# Patient Record
Sex: MALE | Race: WHITE | NOT HISPANIC OR LATINO | Employment: FULL TIME | URBAN - METROPOLITAN AREA
[De-identification: names, ages, dates, MRNs, and addresses within clinical notes are randomized per-mention and may not be internally consistent; named-entity substitution may affect disease eponyms.]

---

## 2017-04-24 ENCOUNTER — APPOINTMENT (OUTPATIENT)
Dept: LAB | Facility: CLINIC | Age: 57
End: 2017-04-24
Payer: COMMERCIAL

## 2017-04-24 ENCOUNTER — GENERIC CONVERSION - ENCOUNTER (OUTPATIENT)
Dept: OTHER | Facility: OTHER | Age: 57
End: 2017-04-24

## 2017-04-24 ENCOUNTER — TRANSCRIBE ORDERS (OUTPATIENT)
Dept: LAB | Facility: CLINIC | Age: 57
End: 2017-04-24

## 2017-04-24 ENCOUNTER — ALLSCRIPTS OFFICE VISIT (OUTPATIENT)
Dept: OTHER | Facility: OTHER | Age: 57
End: 2017-04-24

## 2017-04-24 DIAGNOSIS — E78.00 PURE HYPERCHOLESTEROLEMIA: ICD-10-CM

## 2017-04-24 DIAGNOSIS — Z00.00 ENCOUNTER FOR GENERAL ADULT MEDICAL EXAMINATION WITHOUT ABNORMAL FINDINGS: ICD-10-CM

## 2017-04-24 DIAGNOSIS — I10 ESSENTIAL (PRIMARY) HYPERTENSION: ICD-10-CM

## 2017-04-24 LAB
ALBUMIN SERPL BCP-MCNC: 3.8 G/DL (ref 3.5–5)
ALP SERPL-CCNC: 74 U/L (ref 46–116)
ALT SERPL W P-5'-P-CCNC: 34 U/L (ref 12–78)
ANION GAP SERPL CALCULATED.3IONS-SCNC: 5 MMOL/L (ref 4–13)
AST SERPL W P-5'-P-CCNC: 19 U/L (ref 5–45)
BACTERIA UR QL AUTO: ABNORMAL /HPF
BILIRUB SERPL-MCNC: 0.52 MG/DL (ref 0.2–1)
BILIRUB UR QL STRIP: NEGATIVE
BUN SERPL-MCNC: 19 MG/DL (ref 5–25)
CALCIUM SERPL-MCNC: 8.8 MG/DL (ref 8.3–10.1)
CHLORIDE SERPL-SCNC: 107 MMOL/L (ref 100–108)
CHOLEST SERPL-MCNC: 150 MG/DL (ref 50–200)
CLARITY UR: ABNORMAL
CO2 SERPL-SCNC: 27 MMOL/L (ref 21–32)
COLOR UR: YELLOW
CREAT SERPL-MCNC: 1 MG/DL (ref 0.6–1.3)
EST. AVERAGE GLUCOSE BLD GHB EST-MCNC: 111 MG/DL
GFR SERPL CREATININE-BSD FRML MDRD: >60 ML/MIN/1.73SQ M
GLUCOSE P FAST SERPL-MCNC: 106 MG/DL (ref 65–99)
GLUCOSE UR STRIP-MCNC: NEGATIVE MG/DL
HBA1C MFR BLD: 5.5 % (ref 4.2–6.3)
HDLC SERPL-MCNC: 56 MG/DL (ref 40–60)
HGB UR QL STRIP.AUTO: ABNORMAL
HYALINE CASTS #/AREA URNS LPF: ABNORMAL /LPF
KETONES UR STRIP-MCNC: NEGATIVE MG/DL
LDLC SERPL CALC-MCNC: 82 MG/DL (ref 0–100)
LEUKOCYTE ESTERASE UR QL STRIP: NEGATIVE
NITRITE UR QL STRIP: NEGATIVE
NON-SQ EPI CELLS URNS QL MICRO: ABNORMAL /HPF
PH UR STRIP.AUTO: 6 [PH] (ref 4.5–8)
POTASSIUM SERPL-SCNC: 4.7 MMOL/L (ref 3.5–5.3)
PROT SERPL-MCNC: 7.4 G/DL (ref 6.4–8.2)
PROT UR STRIP-MCNC: NEGATIVE MG/DL
RBC #/AREA URNS AUTO: ABNORMAL /HPF
SODIUM SERPL-SCNC: 139 MMOL/L (ref 136–145)
SP GR UR STRIP.AUTO: 1.03 (ref 1–1.03)
TRIGL SERPL-MCNC: 62 MG/DL
UROBILINOGEN UR QL STRIP.AUTO: 0.2 E.U./DL
WBC #/AREA URNS AUTO: ABNORMAL /HPF

## 2017-04-24 PROCEDURE — 36415 COLL VENOUS BLD VENIPUNCTURE: CPT

## 2017-04-24 PROCEDURE — 80061 LIPID PANEL: CPT

## 2017-04-24 PROCEDURE — 80053 COMPREHEN METABOLIC PANEL: CPT

## 2017-04-24 PROCEDURE — 81001 URINALYSIS AUTO W/SCOPE: CPT

## 2017-04-24 PROCEDURE — 83036 HEMOGLOBIN GLYCOSYLATED A1C: CPT

## 2017-09-11 ENCOUNTER — ALLSCRIPTS OFFICE VISIT (OUTPATIENT)
Dept: OTHER | Facility: OTHER | Age: 57
End: 2017-09-11

## 2018-01-11 NOTE — PROGRESS NOTES
Assessment    1  Encounter for preventive health examination (V70 0) (Z00 00)    Plan  Encounter for prostate cancer screening, Lip swelling    · (1) PSA (SCREEN) (Dx V76 44 Screen for Prostate Cancer); Status:Active; Requested  for:92Sbo1612;   Hypercholesterolemia    · (1) LIPID PANEL, FASTING; Status:Active; Requested for:13Hud1715;   Hypertension    · (1) COMPREHENSIVE METABOLIC PANEL; Status:Active; Requested for:92Qmh5239;   Hypertension, Lip swelling    · (1) TSH WITH FT4 REFLEX; Status:Active; Requested for:59Ubb1065;   Lip swelling    · (1) CBC/PLT/DIFF; Status:Active; Requested for:89Gro7963;    · (1) C-REACTIVE PROTEIN; Status:Active; Requested for:32Ipj3969;   Screen for STD (sexually transmitted disease)    · (1) RAPID HIV-1 AND HIV-2 ANTIBODIES; [Do Not Release]; Status:Active; Requested  for:45Kqg6401;     Discussion/Summary  Impression: health maintenance visit  Prostate cancer screening: PSA was ordered  Colorectal cancer screening: colorectal cancer screening is current and 12/15 colonoscopy  He was advised to be evaluated by an optometrist and a dentist      Overall he is doing well, he did have another episode of lower lip swelling recently though  In June 2015 we had switched off of Ace inhibitor to ARB due to swelling, I would like him to consult with allergist with ongoing episodes  He does have Benadryl to use if needed  We may want to switch from ARB for completeness, await consult  He will do fasting blood work, continue with atorvastatin as is  Routine exercise, healthy diet  Observe right elbow  Visit here 6 months  Chief Complaint  Physical      History of Present Illness  HPI: In for regular check, was here in March with prostatitis, use Cipro Ã10 days, UA CNS was negative at that time   No  sx now    Had another episode few hrs lower lip swelling - relief after few hrs w benadryl    Right tennis elbow strain recently w yardwork    Monogamous 10 mo - desires HIV Review of Systems    Constitutional: No fever or chills, feels well, no tiredness, no recent weight gain or weight loss  Eyes: saw eye dr, but No complaints of eye pain, no red eyes, no discharge from eyes, no itchy eyes  ENT: no complaints of earache, no hearing loss, no nosebleeds, no nasal discharge, no sore throat, no hoarseness  Cardiovascular: No complaints of slow heart rate, no fast heart rate, no chest pain, no palpitations, no leg claudication, no lower extremity  Respiratory: No complaints of shortness of breath, no wheezing, no cough, no SOB on exertion, no orthopnea or PND  Gastrointestinal: No complaints of abdominal pain, no constipation, no nausea or vomiting, no diarrhea or bloody stools  Genitourinary: no dysuria  Musculoskeletal: as noted in HPI  Neurological: No compliants of headache, no confusion, no convulsions, no numbness or tingling, no dizziness or fainting, no limb weakness, no difficulty walking  Psychiatric: Is not suicidal, no sleep disturbances, no anxiety or depression, no change in personality, no emotional problems  Endocrine: No complaints of proptosis, no hot flashes, no muscle weakness, no erectile dysfunction, no deepening of the voice, no feelings of weakness  Hematologic/Lymphatic: No complaints of swollen glands, no swollen glands in the neck, does not bleed easily, no easy bruising  Active Problems    1  History of Cervical radiculopathy (723 4) (M54 12)   2  Former smoker (V15 82) (Y06 536)   3  Frequent urination (788 41) (R35 0)   4  History of acute prostatitis (V13 89) (Z87 438)   5  Hypercholesterolemia (272 0) (E78 0)   6  Hypertension (401 9) (I10)   7  Lip swelling (528 5) (R22 9)   8   Migraine headache (346 90) (G43 909)    Past Medical History    · History of Acute sinusitis (461 9) (J01 90)   · History of Cervical radiculopathy (723 4) (M54 12)   · History of Encounter for prostate cancer screening (V76 44) (Z12 5)   · History of acute prostatitis (V13 89) (Z87 438)   · History of Influenza vaccination administered during current admission (V04 81) (Z23)   · History of Night sweats (780 8) (R61)   · History of Viral hepatitis A without coma (070 1) (B15 9)    Surgical History    · History of Cholecystectomy   · History of Complete Colonoscopy    Family History  Mother    · Family history of Sarcoma  Father    · Family history of Hypertension (V17 49)    Social History    · Being A Social Drinker   · 1 glass wine a day   · Employment History: (V62 1)   · [address], [employer]   · Former smoker (K66 62) (M72 617)   · quit 12/14   · Marital History - Single   · Recreational Activities Walking    Current Meds   1  Atorvastatin Calcium 10 MG Oral Tablet; TAKE ONE TABLET BY MOUTH ONCE DAILY; Therapy: 69KEJ8704 to (Evaluate:12Jun2016)  Requested for: 40GFQ7984; Last   Rx:02Fta9966 Ordered   2  EPINEPHrine 0 3 MG/0 3ML Injection Solution Auto-injector; Utilize the pen when having   an anaphylactic reaction as the package directs; Therapy: 82MDA6540 to (Last Rx:83Tca0502)  Requested for: 80UDX4226 Ordered   3  Losartan Potassium 50 MG Oral Tablet; Take 1 tablet by mouth daily; Therapy: 47ONM0288 to (Evaluate:21Oct2016)  Requested for: 27Oct2015; Last   Rx:27Oct2015 Ordered    Allergies    1  Lisinopril TABS    Vitals   Recorded: 63Qoy8734 08:13AM Recorded: 14Vzw4375 07:58AM   Heart Rate  68   Systolic 156 544   Diastolic 84 88   Height  6 ft    Weight  199 lb 2 08 oz   BMI Calculated  27 01   BSA Calculated  2 13     Physical Exam    Constitutional   General appearance: No acute distress, well appearing and well nourished  Head and Face   Head and face: Normal     Palpation of the face and sinuses: No sinus tenderness  Eyes   Conjunctiva and lids: No erythema, swelling or discharge  sl long term exophth OS saw eye dr Blanca Davis, Nose, Mouth, and Throat no swelling  Oropharynx: Normal with no erythema, edema, exudate or lesions  Neck   Neck: Supple, symmetric, trachea midline, no masses  Thyroid: Normal, no thyromegaly  Pulmonary   Auscultation of lungs: Clear to auscultation  Cardiovascular   Auscultation of heart: Normal rate and rhythm, normal S1 and S2, no murmurs  Carotid pulses: 2+ bilaterally  Examination of extremities for edema and/or varicosities: Normal     Abdomen   Abdomen: Non-tender, no masses  Lymphatic   Palpation of lymph nodes in neck: No lymphadenopathy  Musculoskeletal   Gait and station: Normal     Skin   Skin and subcutaneous tissue: Normal without rashes or lesions  faint tan round area right upper inner arm  Neurologic   Cortical function: Normal mental status  Coordination: Normal finger to nose and heel to shin  Psychiatric   Judgment and insight: Normal     Orientation to person, place and time: Normal     Recent and remote memory: Intact  Mood and affect: Normal        Results/Data  PHQ-2 Adult Depression Screening 20Apr2016 08:00AM User, Ahs     Test Name Result Flag Reference   PHQ-2 Adult Depression Score 0     Q1: 0, Q2: 0   PHQ-2 Adult Depression Screening Negative         Signatures   Electronically signed by : Sharad Dsouza DO;  Apr 20 2016  8:25AM EST                       (Author)

## 2018-01-13 VITALS
DIASTOLIC BLOOD PRESSURE: 80 MMHG | BODY MASS INDEX: 27.39 KG/M2 | HEART RATE: 80 BPM | TEMPERATURE: 99.6 F | WEIGHT: 202.25 LBS | HEIGHT: 72 IN | SYSTOLIC BLOOD PRESSURE: 138 MMHG

## 2018-01-14 VITALS
BODY MASS INDEX: 27.38 KG/M2 | WEIGHT: 202.13 LBS | HEIGHT: 72 IN | SYSTOLIC BLOOD PRESSURE: 140 MMHG | DIASTOLIC BLOOD PRESSURE: 80 MMHG

## 2018-01-16 NOTE — RESULT NOTES
Verified Results  (1) LIPID PANEL, FASTING 20Apr2016 08:31AM Juan Alberto Lara Order Number: [id number]    TW Order Number: [id number] Order Number: [id number] Order Number: [id number] Order Number: [id number]  Triglyceride:         Normal              <150 mg/dl       Borderline High    150-199 mg/dl       High               200-499 mg/dl       Very High          >499 mg/dl  Cholesterol:         Desirable        <200 mg/dl      Borderline High  200-239 mg/dl      High             >239 mg/dl  HDL Cholesterol:        High    >59 mg/dL      Low     <41 mg/dL  LDL CALCULATED:    This screening LDL is a calculated result  It does not have the accuracy of the Direct Measured LDL in the monitoring of patients with hyperlipidemia and/or statin therapy  Direct Measure LDL (NSO357) must be ordered separately in these patients  Test Name Result Flag Reference   CHOLESTEROL 138 mg/dL     HDL,DIRECT 56 mg/dL  40-60   Specimen collection should occur prior to Metamizole administration due to the potential for falsely depressed results  LDL CHOLESTEROL CALCULATED 63 mg/dL  0-100   TRIGLYCERIDES 97 mg/dL  <=150   Specimen collection should occur prior to N-Acetylcysteine or Metamizole administration due to the potential for falsely depressed results       (1) CBC/PLT/DIFF 20Apr2016 08:31AM Juan Alberto Lara Order Number: [id number]    TW Order Number: [id number]     Test Name Result Flag Reference   WBC COUNT 7 19 Thousand/uL  4 31-10 16   RBC COUNT 4 45 Million/uL  3 88-5 62   HEMOGLOBIN 14 7 g/dL  12 0-17 0   HEMATOCRIT 41 8 %  36 5-49 3   MCV 94 fL  82-98   MCH 33 0 pg  26 8-34 3   MCHC 35 2 g/dL  31 4-37 4   RDW 13 4 %  11 6-15 1   MPV 10 4 fL  8 9-12 7   PLATELET COUNT 575 Thousands/uL  149-390   nRBC AUTOMATED 0 /100 WBCs     NEUTROPHILS RELATIVE PERCENT 46 %  43-75   LYMPHOCYTES RELATIVE PERCENT 42 %  14-44   MONOCYTES RELATIVE PERCENT 7 %  4-12   EOSINOPHILS RELATIVE PERCENT 4 %  0-6 BASOPHILS RELATIVE PERCENT 1 %  0-1   NEUTROPHILS ABSOLUTE COUNT 3 35 Thousands/µL  1 85-7 62   LYMPHOCYTES ABSOLUTE COUNT 3 01 Thousands/µL  0 60-4 47   MONOCYTES ABSOLUTE COUNT 0 51 Thousand/µL  0 17-1 22   EOSINOPHILS ABSOLUTE COUNT 0 27 Thousand/µL  0 00-0 61   BASOPHILS ABSOLUTE COUNT 0 04 Thousands/µL  0 00-0 10     (1) TSH WITH FT4 REFLEX 20Apr2016 08:31AM [facility] Order Number: [id number]     Order Number: [id number] Order Number: [id number] Order Number: [id number] Order Number: [id number]     Test Name Result Flag Reference   TSH 2 390 uIU/mL  0 358-3 740     (1) PSA (SCREEN) (Dx V76 44 Screen for Prostate Cancer) 20Apr2016 08:31AM [facility] Order Number: [id number]     Order Number: [id number]     Test Name Result Flag Reference   PROSTATE SPECIFIC ANTIGEN 0 7 ng/mL  0 0-4 0     (1) COMPREHENSIVE METABOLIC PANEL 81ZRW6723 68:01OQ [facility] Order Number: [id number]     Order Number: [id number] Order Number: [id number] Order Number: [id number] Order Number: [id number]  National Kidney Disease Education Program recommendations are as follows:  GFR calculation is accurate only with a steady state creatinine  Chronic Kidney disease less than 60 ml/min/1 73 sq  meters  Kidney failure less than 15 ml/min/1 73 sq  meters  Test Name Result Flag Reference   GLUCOSE,RANDM 95 mg/dL     If the patient is fasting, the ADA then defines impaired fasting glucose as > 100 mg/dL and diabetes as > or equal to 123 mg/dL     SODIUM 142 mmol/L  136-145   POTASSIUM 4 5 mmol/L  3 5-5 3   CHLORIDE 107 mmol/L  100-108   CARBON DIOXIDE 28 mmol/L  21-32   ANION GAP (CALC) 7 mmol/L  4-13   BLOOD UREA NITROGEN 20 mg/dL  5-25   CREATININE 1 01 mg/dL  0 60-1 30   Standardized to IDMS reference method   CALCIUM 9 2 mg/dL  8 3-10 1   BILI, TOTAL 0 46 mg/dL  0 20-1 00   ALK PHOSPHATAS 73 U/L     ALT (SGPT) 35 U/L  12-78   AST(SGOT) 13 U/L  5-45   ALBUMIN 3 9 g/dL 3 5-5 0   TOTAL PROTEIN 7 3 g/dL  6 4-8 2   eGFR Non-African American      >60 0 ml/min/1 73sq m     (1) C-REACTIVE PROTEIN 20Apr2016 08:31AM Beverly Cobia Order Number: [id number]    TW Order Number: [id number] Order Number: [id number] Order Number: [id number] Order Number: [id number]     Test Name Result Flag Reference   C-REACT PROTEIN <3 0 mg/L  <3 0     (1) RAPID HIV-1 AND HIV-2 ANTIBODIES 20Apr2016 08:31AM Beverly Cobia Order Number: [id number]      Negative for HIV-1 p24 Antigen  Negative for HIV-1 and/or HIV-2 Antibody       Test Name Result Flag Reference   RAPID HIV 1 AND 2 Non-Reactive  Non-Reactive   HIV-1 P24 ANTIGEN SCREEN Non-reactive  Non-Reactive

## 2018-01-17 NOTE — RESULT NOTES
Verified Results  (1) COMPREHENSIVE METABOLIC PANEL 70BTG6416 12:56DF Juan Alberto Lamp Order Number: [id number]     Test Name Result Flag Reference   SODIUM 139 mmol/L  136-145   POTASSIUM 4 7 mmol/L  3 5-5 3   CHLORIDE 107 mmol/L  100-108   CARBON DIOXIDE 27 mmol/L  21-32   ANION GAP (CALC) 5 mmol/L  4-13   BLOOD UREA NITROGEN 19 mg/dL  5-25   CREATININE 1 00 mg/dL  0 60-1 30   Standardized to IDMS reference method   CALCIUM 8 8 mg/dL  8 3-10 1   BILI, TOTAL 0 52 mg/dL  0 20-1 00   ALK PHOSPHATAS 74 U/L     ALT (SGPT) 34 U/L  12-78   AST(SGOT) 19 U/L  5-45   ALBUMIN 3 8 g/dL  3 5-5 0   TOTAL PROTEIN 7 4 g/dL  6 4-8 2   eGFR Non-African American      >60 0 ml/min/1 73sq [facility] Disease Education Program recommendations are as follows:  GFR calculation is accurate only with a steady state creatinine  Chronic Kidney disease less than 60 ml/min/1 73 sq  meters  Kidney failure less than 15 ml/min/1 73 sq  meters  GLUCOSE FASTING 106 mg/dL H 65-99     (1) URINALYSIS (will reflex a microscopy if leukocytes, occult blood, protein or nitrites are not within normal limits) 24Apr2017 09:07AM Juan Alberto Lamp Order Number: [id number]     Test Name Result Flag Reference   COLOR Yellow     CLARITY Turbid     SPECIFIC GRAVITY UA 1 029  1 003-1 030   PH UA 6 0  4 5-8 0   LEUKOCYTE ESTERASE UA Negative  Negative   NITRITE UA Negative  Negative   PROTEIN UA Negative mg/dl  Negative   GLUCOSE UA Negative mg/dl  Negative   KETONES UA Negative mg/dl  Negative   UROBILINOGEN UA 0 2 E U /dl  0 2, 1 0 E U /dl   BILIRUBIN UA Negative  Negative   BLOOD UA Small A Negative     (1) HEMOGLOBIN A1C 24Apr2017 09:07AM Juan Alberto Lamp Order Number: [id number]     Test Name Result Flag Reference   HEMOGLOBIN A1C 5 5 %  4 2-6 3   EST  AVG   GLUCOSE 111 mg/dl       (1) LIPID PANEL, FASTING 24Apr2017 09:07AM Juan Alberto Lamp Order Number: [id number]     Test Name Result Flag Reference   CHOLESTEROL 150 mg/dL     HDL,DIRECT 56 mg/dL  40-60   Specimen collection should occur prior to Metamizole administration due to the potential for falsely depressed results  LDL CHOLESTEROL CALCULATED 82 mg/dL  0-100   Triglyceride:         Normal              <150 mg/dl       Borderline High    150-199 mg/dl       High               200-499 mg/dl       Very High          >499 mg/dl  Cholesterol:         Desirable        <200 mg/dl      Borderline High  200-239 mg/dl      High             >239 mg/dl  HDL Cholesterol:        High    >59 mg/dL      Low     <41 mg/dL  LDL CALCULATED:    This screening LDL is a calculated result  It does not have the accuracy of the Direct Measured LDL in the monitoring of patients with hyperlipidemia and/or statin therapy  Direct Measure LDL (SAN999) must be ordered separately in these patients  TRIGLYCERIDES 62 mg/dL  <=150   Specimen collection should occur prior to N-Acetylcysteine or Metamizole administration due to the potential for falsely depressed results

## 2018-01-17 NOTE — RESULT NOTES
Verified Results  (1) URINALYSIS w URINE C/S REFLEX (will reflex a microscopy if leukocytes, occult blood, or nitrites are not within normal limits) 61DYT7786 04:31PM [facility]     Test Name Result Flag Reference   COLOR Yellow     CLARITY Clear     PH UA 5 5  4 5-8 0   LEUKOCYTE ESTERASE UA Negative  Negative   NITRITE UA Negative  Negative   PROTEIN UA Negative mg/dl  Negative   GLUCOSE UA Negative mg/dl  Negative   KETONES UA Negative mg/dl  Negative   UROBILINOGEN UA 0 2 E U /dl  0 2, 1 0 E U /dl   BILIRUBIN UA Negative  Negative   BLOOD UA Trace A Negative   SPECIFIC GRAVITY UA 1 021  1 003-1 030     (1) URINALYSIS w URINE C/S REFLEX (will reflex a microscopy if leukocytes, occult blood, or nitrites are not within normal limits) 68GXO7781 04:31PM [facility]     Test Name Result Flag Reference   BACTERIA None Seen /hpf  None Seen, Occasional   EPITHELIAL CELLS None Seen /hpf  None Seen, Occasional   RBC UA None Seen /hpf  None Seen   WBC UA None Seen /hpf  None Seen     (1) URINALYSIS w URINE C/S REFLEX (will reflex a microscopy if leukocytes, occult blood, or nitrites are not within normal limits) 68CXT6935 04:31PM [facility]     Test Name Result Flag Reference   CLINICAL REPORT (Report)     Test:        Urine culture  Specimen Source:  Urine, Unspecified Source  Specimen Type:   Urine  Specimen Date:   3/17/2016 4:31 PM  Result Date:    3/18/2016 2:16 PM  Result Status:   Final result  Resulting Lab:   [facility] [address] [place] [id number]            Tel: [phone]                 CULTURE                                       ------------------                                   No Growth <1000 cfu/mL

## 2018-05-03 RX ORDER — ATORVASTATIN CALCIUM 10 MG/1
1 TABLET, FILM COATED ORAL DAILY
COMMUNITY
Start: 2014-11-03 | End: 2018-05-04 | Stop reason: SDUPTHER

## 2018-05-03 RX ORDER — METHOCARBAMOL 500 MG/1
1 TABLET, FILM COATED ORAL
COMMUNITY
Start: 2017-09-11 | End: 2019-01-04 | Stop reason: ALTCHOICE

## 2018-05-03 RX ORDER — LOSARTAN POTASSIUM 50 MG/1
1 TABLET ORAL DAILY
COMMUNITY
Start: 2015-06-18 | End: 2018-06-20 | Stop reason: SDUPTHER

## 2018-05-03 RX ORDER — EPINEPHRINE 0.3 MG/.3ML
INJECTION SUBCUTANEOUS
COMMUNITY
Start: 2015-05-31 | End: 2022-04-20

## 2018-05-04 ENCOUNTER — OFFICE VISIT (OUTPATIENT)
Dept: FAMILY MEDICINE CLINIC | Facility: CLINIC | Age: 58
End: 2018-05-04
Payer: COMMERCIAL

## 2018-05-04 VITALS
DIASTOLIC BLOOD PRESSURE: 86 MMHG | SYSTOLIC BLOOD PRESSURE: 134 MMHG | HEIGHT: 73 IN | WEIGHT: 199.4 LBS | HEART RATE: 70 BPM | RESPIRATION RATE: 17 BRPM | BODY MASS INDEX: 26.43 KG/M2

## 2018-05-04 DIAGNOSIS — Z13.1 SCREENING FOR DIABETES MELLITUS: ICD-10-CM

## 2018-05-04 DIAGNOSIS — Z11.59 NEED FOR HEPATITIS C SCREENING TEST: ICD-10-CM

## 2018-05-04 DIAGNOSIS — Z13.1 SCREENING FOR DIABETES MELLITUS (DM): ICD-10-CM

## 2018-05-04 DIAGNOSIS — I10 ESSENTIAL HYPERTENSION: ICD-10-CM

## 2018-05-04 DIAGNOSIS — Z00.00 WELL ADULT EXAM: Primary | ICD-10-CM

## 2018-05-04 DIAGNOSIS — E78.00 HYPERCHOLESTEROLEMIA: ICD-10-CM

## 2018-05-04 DIAGNOSIS — Z13.6 SCREENING FOR CARDIOVASCULAR CONDITION: ICD-10-CM

## 2018-05-04 DIAGNOSIS — Z11.3 ROUTINE SCREENING FOR STI (SEXUALLY TRANSMITTED INFECTION): ICD-10-CM

## 2018-05-04 DIAGNOSIS — Z12.5 SCREENING FOR PROSTATE CANCER: ICD-10-CM

## 2018-05-04 PROBLEM — N40.1 BPH ASSOCIATED WITH NOCTURIA: Status: ACTIVE | Noted: 2018-05-04

## 2018-05-04 PROBLEM — R35.1 BPH ASSOCIATED WITH NOCTURIA: Status: ACTIVE | Noted: 2018-05-04

## 2018-05-04 PROCEDURE — 99396 PREV VISIT EST AGE 40-64: CPT | Performed by: FAMILY MEDICINE

## 2018-05-04 RX ORDER — ATORVASTATIN CALCIUM 10 MG/1
10 TABLET, FILM COATED ORAL DAILY
Qty: 30 TABLET | Refills: 11 | Status: SHIPPED | OUTPATIENT
Start: 2018-05-04 | End: 2019-01-04 | Stop reason: SDUPTHER

## 2018-05-04 NOTE — PROGRESS NOTES
Family Medicine Follow-Up Office Visit  Sina Vo 61 y o  male   MRN: [id number] : 1960  ENCOUNTER: 2018 2:31 PM    Assessment and Plan   BPH associated with nocturia  The patient says he gets up once some nights but not always, he does have a mildly enlarged prostate on exam, I do not believe there is any reason at this time to start medicine after discussing this with him but I would encourage him to call our office if things are changing    Hypertension   Blood pressure well controlled, patient is stable on losartan, no change at this time    Hypercholesterolemia  Stable on Atorvastatin without side effects, refills provided and will check labs     69-year-old gentleman who is here today for health maintenance  He is overall doing well and has very few complaints, he is due for his screening labs for the [employer] program caring starts with you and I will order these today, he has a history of hyperlipidemia and hypertension and so labs are appropriate, he has a normal body weight, and has not had an increase fasting glucose but given the requirements we will check an A1c  Patient is agreeable for STI testing today and he is also agreeable for hep C testing  Prostate exam in the office today showed mild BPH, will check a PSA with this lab work  Recommend to the patient that he find out from his pharmacy about the [facility] vaccine--could call his insurance and find out if he can go elsewhere  Chief Complaint     Chief Complaint   Patient presents with    Physical Exam     employee physical - discuss allergy to nsaids        History of Present Illness   Sina Vo is a 61y o -year-old male who presents today for Health Maintenance visit  He is overall doing well, he has no specific complaints today  We did do the patient centered medical home screening which is noted on the appropriate form associated with this note    He has a history of high cholesterol, he takes Lipitor 10 for this, he tolerates it well without any side effects  He has a history of high blood pressure, he was intolerant of ACE-inhibitor but has been tolerant of losartan, he has good control and tolerance  He is requesting STI screening today  He likes to have this done annually  He has 1 partner and uses protection  Had to use it  He is requesting that his labs for his insurance program caring starts with you be ordered  The patient reports a completely negative review of systems have migraine headaches but since stopping eating bananas these have resolved  He gets up sometimes at night, once, not every single night  Sometimes has symptoms of allergic rhinitis, today his eye doctor who he saw this morning provided him with a new eyedrops to help with some itching of his eyes  He does have an EpiPen because he had some facial swelling and lip edema with taking an Ace inhibitor, he has not ever had to use it  Review of Systems   Review of Systems   Constitutional: Negative for chills, fatigue, fever and unexpected weight change  HENT: Negative for hearing loss, postnasal drip and sore throat  Eyes: Negative for discharge and visual disturbance  Respiratory: Negative for shortness of breath  Cardiovascular: Negative for chest pain  Gastrointestinal: Negative for constipation, diarrhea, nausea and vomiting  Genitourinary: Negative for dysuria  No incontinence   Musculoskeletal: Negative for arthralgias and myalgias  Skin: Negative for rash  Neurological: Negative for headaches  Psychiatric/Behavioral:        No anxiety or depression   All other systems reviewed and are negative        Active Problem List     Patient Active Problem List   Diagnosis    Allergic rhinitis    Hypercholesterolemia    Hypertension    Migraine headache    Nasal polyp    BPH associated with nocturia       Past Medical History, Past Surgical History, Family History, and Social History were reviewed and updated today as appropriate  Objective   /86 (BP Location: Left arm, Patient Position: Sitting, Cuff Size: Large)   Ht 6' 1" (1 854 m)   Wt 90 4 kg (199 lb 6 4 oz)   BMI 26 31 kg/m²     Physical Exam   Constitutional: He is oriented to person, place, and time  Vital signs are normal  He appears well-developed and well-nourished  Non-toxic appearance  No distress  Appears Stated Age   HENT:   Head: Normocephalic and atraumatic  Nose: Nose normal    Mouth/Throat: Oropharynx is clear and moist  No oropharyngeal exudate  Eyes: Conjunctivae and EOM are normal  Pupils are equal, round, and reactive to light  Right eye exhibits no discharge  Left eye exhibits no discharge  Left conjunctiva is not injected  No scleral icterus  Right eye exhibits no nystagmus  Left eye exhibits no nystagmus  Accomodation intact   Neck: Normal range of motion  Neck supple  Carotid bruit is not present  Cardiovascular: Normal rate, regular rhythm, S1 normal and S2 normal   Exam reveals no gallop and no friction rub  No murmur heard  Pulmonary/Chest: Effort normal and breath sounds normal  No respiratory distress  He has no wheezes  He has no rhonchi  He has no rales  Abdominal: Soft  Bowel sounds are normal  He exhibits no distension  There is no tenderness  There is no rebound and no guarding  Genitourinary: Rectum normal    Genitourinary Comments: Mildly enlarged prostate, nontender, no nodularity or bogginess   Musculoskeletal: He exhibits no edema  No clubbing or cyanosis   Lymphadenopathy:     He has no cervical adenopathy  Neurological: He is alert and oriented to person, place, and time  No focal neurologic deficits noted on exam, no change from baseline status   Skin: Skin is warm and dry  No rash noted  He is not diaphoretic  Psychiatric: He has a normal mood and affect  His behavior is normal  Thought content normal    stable   Vitals reviewed      Diabetic Foot Exam    Pertinent Laboratory/Diagnostic Studies:  Lab Results   Component Value Date    GLUCOSE 95 04/20/2016    BUN 19 04/24/2017    CREATININE 1 00 04/24/2017    CALCIUM 8 8 04/24/2017     04/24/2017    K 4 7 04/24/2017    CO2 27 04/24/2017     04/24/2017     Lab Results   Component Value Date    ALT 34 04/24/2017    AST 19 04/24/2017    ALKPHOS 74 04/24/2017    BILITOT 0 52 04/24/2017       Lab Results   Component Value Date    WBC 7 19 04/20/2016    HGB 14 7 04/20/2016    HCT 41 8 04/20/2016    MCV 94 04/20/2016     04/20/2016       No results found for: TSH    Lab Results   Component Value Date    CHOL 150 04/24/2017     Lab Results   Component Value Date    TRIG 62 04/24/2017     Lab Results   Component Value Date    HDL 56 04/24/2017     Lab Results   Component Value Date    LDLCALC 82 04/24/2017     Lab Results   Component Value Date    HGBA1C 5 5 04/24/2017       Results for orders placed or performed in visit on 04/24/17   Comprehensive metabolic panel   Result Value Ref Range    Sodium 139 136 - 145 mmol/L    Potassium 4 7 3 5 - 5 3 mmol/L    Chloride 107 100 - 108 mmol/L    CO2 27 21 - 32 mmol/L    Anion Gap 5 4 - 13 mmol/L    BUN 19 5 - 25 mg/dL    Creatinine 1 00 0 60 - 1 30 mg/dL    Glucose, Fasting 106 (H) 65 - 99 mg/dL    Calcium 8 8 8 3 - 10 1 mg/dL    AST 19 5 - 45 U/L    ALT 34 12 - 78 U/L    Alkaline Phosphatase 74 46 - 116 U/L    Total Protein 7 4 6 4 - 8 2 g/dL    Albumin 3 8 3 5 - 5 0 g/dL    Total Bilirubin 0 52 0 20 - 1 00 mg/dL    eGFR >60 0 ml/min/1 73sq m   Urinalysis with reflex to microscopic   Result Value Ref Range    Color, UA Yellow     Clarity, UA Turbid     Specific [place], UA 1 029 1 003 - 1 030    pH, UA 6 0 4 5 - 8 0    Leukocytes, UA Negative Negative    Nitrite, UA Negative Negative    Protein, UA Negative Negative mg/dl    Glucose, UA Negative Negative mg/dl    Ketones, UA Negative Negative mg/dl    Urobilinogen, UA 0 2 0 2, 1 0 E U /dl E U /dl    Bilirubin, UA Negative Negative    Blood, UA Small (A) Negative   Hemoglobin A1c   Result Value Ref Range    Hemoglobin A1C 5 5 4 2 - 6 3 %     mg/dl   Lipid panel   Result Value Ref Range    Cholesterol 150 50 - 200 mg/dL    Triglycerides 62 <=150 mg/dL    HDL, Direct 56 40 - 60 mg/dL    LDL Calculated 82 0 - 100 mg/dL   Urine Microscopic   Result Value Ref Range    RBC, UA 2-4 (A) None Seen /hpf    WBC, UA None Seen None Seen /hpf    Epithelial Cells None Seen None Seen, Occasional /hpf    Bacteria, UA None Seen None Seen, Occasional /hpf    Hyaline Casts, UA None Seen None Seen /lpf       Orders Placed This Encounter   Procedures    Hm Colonoscopy         Current Medications     Current Outpatient Prescriptions   Medication Sig Dispense Refill    atorvastatin (LIPITOR) 10 mg tablet Take 1 tablet by mouth daily      EPINEPHrine (EPIPEN) 0 3 mg/0 3 mL SOAJ Inject as directed      losartan (COZAAR) 50 mg tablet Take 1 tablet by mouth daily      methocarbamol (ROBAXIN) 500 mg tablet Take 1 tablet by mouth       No current facility-administered medications for this visit          ALLERGIES:  Allergies   Allergen Reactions    Dust Mite Extract     Lisinopril Edema     Annotation - [id number]: lip swelling x 2 2015    Nsaids Angioedema    Short Ragweed Pollen Ext        Health Maintenance     Health Maintenance   Topic Date Due    Hepatitis C Screening  1960    HIV SCREENING  10/30/2017    INFLUENZA VACCINE  09/01/2018    Depression Screening PHQ-9  05/04/2019    COLONOSCOPY  12/03/2020    DTaP,Tdap,and Td Vaccines (2 - Td) 02/01/2025     Immunization History   Administered Date(s) Administered    Influenza Quadrivalent Preservative Free 3 years and older IM 10/30/2014, 10/01/2016    Influenza Quadrivalent, 6-35 Months IM 10/20/2015    Tdap 02/01/2015         Karlos Villafana MD   [facility]  5/4/2018  2:31 PM    Parts of this note were dictated using M*Profile[employer] dictation software and may have sounds-like errors due to variation in pronunciation

## 2018-05-04 NOTE — ASSESSMENT & PLAN NOTE
The patient says he gets up once some nights but not always, he does have a mildly enlarged prostate on exam, I do not believe there is any reason at this time to start medicine after discussing this with him but I would encourage him to call our office if things are changing

## 2018-05-04 NOTE — PATIENT INSTRUCTIONS
Assessment and Plan   BPH associated with nocturia  The patient says he gets up once some nights but not always, he does have a mildly enlarged prostate on exam, I do not believe there is any reason at this time to start medicine after discussing this with him but I would encourage him to call our office if things are changing     Hypertension   Blood pressure well controlled, patient is stable on losartan, no change at this time     Hypercholesterolemia  Stable on Atorvastatin without side effects, refills provided and will check labs      59-year-old gentleman who is here today for health maintenance  He is overall doing well and has very few complaints, he is due for his screening labs for the [employer] program caring starts with you and I will order these today, he has a history of hyperlipidemia and hypertension and so labs are appropriate, he has a normal body weight, and has not had an increase fasting glucose but given the requirements we will check an A1c  Patient is agreeable for STI testing today and he is also agreeable for hep C testing  Prostate exam in the office today showed mild BPH, will check a PSA with this lab work  Recommend to the patient that he find out from his pharmacy about the [address] vaccine--could call his insurance and find out if he can go elsewhere

## 2018-05-07 ENCOUNTER — APPOINTMENT (OUTPATIENT)
Dept: LAB | Facility: CLINIC | Age: 58
End: 2018-05-07
Payer: COMMERCIAL

## 2018-05-07 ENCOUNTER — TRANSCRIBE ORDERS (OUTPATIENT)
Dept: LAB | Facility: CLINIC | Age: 58
End: 2018-05-07

## 2018-05-07 DIAGNOSIS — I10 ESSENTIAL HYPERTENSION: ICD-10-CM

## 2018-05-07 DIAGNOSIS — E78.00 HYPERCHOLESTEROLEMIA: ICD-10-CM

## 2018-05-07 DIAGNOSIS — Z13.1 SCREENING FOR DIABETES MELLITUS (DM): ICD-10-CM

## 2018-05-07 DIAGNOSIS — Z11.3 ROUTINE SCREENING FOR STI (SEXUALLY TRANSMITTED INFECTION): ICD-10-CM

## 2018-05-07 DIAGNOSIS — Z13.1 SCREENING FOR DIABETES MELLITUS: ICD-10-CM

## 2018-05-07 DIAGNOSIS — Z13.6 SCREENING FOR CARDIOVASCULAR CONDITION: ICD-10-CM

## 2018-05-07 DIAGNOSIS — Z11.59 NEED FOR HEPATITIS C SCREENING TEST: ICD-10-CM

## 2018-05-07 LAB
ALBUMIN SERPL BCP-MCNC: 3.6 G/DL (ref 3.5–5)
ALP SERPL-CCNC: 74 U/L (ref 46–116)
ALT SERPL W P-5'-P-CCNC: 27 U/L (ref 12–78)
ANION GAP SERPL CALCULATED.3IONS-SCNC: 5 MMOL/L (ref 4–13)
AST SERPL W P-5'-P-CCNC: 18 U/L (ref 5–45)
BASOPHILS # BLD AUTO: 0.03 THOUSANDS/ΜL (ref 0–0.1)
BASOPHILS NFR BLD AUTO: 0 % (ref 0–1)
BILIRUB SERPL-MCNC: 0.47 MG/DL (ref 0.2–1)
BUN SERPL-MCNC: 16 MG/DL (ref 5–25)
CALCIUM SERPL-MCNC: 8.6 MG/DL (ref 8.3–10.1)
CHLAMYDIA DNA CVX QL NAA+PROBE: NORMAL
CHLORIDE SERPL-SCNC: 106 MMOL/L (ref 100–108)
CHOLEST SERPL-MCNC: 130 MG/DL (ref 50–200)
CO2 SERPL-SCNC: 28 MMOL/L (ref 21–32)
CREAT SERPL-MCNC: 1.07 MG/DL (ref 0.6–1.3)
EOSINOPHIL # BLD AUTO: 0.2 THOUSAND/ΜL (ref 0–0.61)
EOSINOPHIL NFR BLD AUTO: 3 % (ref 0–6)
ERYTHROCYTE [DISTWIDTH] IN BLOOD BY AUTOMATED COUNT: 13.1 % (ref 11.6–15.1)
EST. AVERAGE GLUCOSE BLD GHB EST-MCNC: 105 MG/DL
GFR SERPL CREATININE-BSD FRML MDRD: 76 ML/MIN/1.73SQ M
GLUCOSE P FAST SERPL-MCNC: 99 MG/DL (ref 65–99)
HBA1C MFR BLD: 5.3 % (ref 4.2–6.3)
HCT VFR BLD AUTO: 43.2 % (ref 36.5–49.3)
HDLC SERPL-MCNC: 56 MG/DL (ref 40–60)
HGB BLD-MCNC: 14.6 G/DL (ref 12–17)
LDLC SERPL CALC-MCNC: 63 MG/DL (ref 0–100)
LYMPHOCYTES # BLD AUTO: 2.8 THOUSANDS/ΜL (ref 0.6–4.47)
LYMPHOCYTES NFR BLD AUTO: 42 % (ref 14–44)
MCH RBC QN AUTO: 32.3 PG (ref 26.8–34.3)
MCHC RBC AUTO-ENTMCNC: 33.8 G/DL (ref 31.4–37.4)
MCV RBC AUTO: 96 FL (ref 82–98)
MONOCYTES # BLD AUTO: 0.5 THOUSAND/ΜL (ref 0.17–1.22)
MONOCYTES NFR BLD AUTO: 7 % (ref 4–12)
N GONORRHOEA DNA GENITAL QL NAA+PROBE: NORMAL
NEUTROPHILS # BLD AUTO: 3.18 THOUSANDS/ΜL (ref 1.85–7.62)
NEUTS SEG NFR BLD AUTO: 48 % (ref 43–75)
NONHDLC SERPL-MCNC: 74 MG/DL
NRBC BLD AUTO-RTO: 0 /100 WBCS
PLATELET # BLD AUTO: 226 THOUSANDS/UL (ref 149–390)
PMV BLD AUTO: 10 FL (ref 8.9–12.7)
POTASSIUM SERPL-SCNC: 4.2 MMOL/L (ref 3.5–5.3)
PROT SERPL-MCNC: 7.1 G/DL (ref 6.4–8.2)
PSA SERPL-MCNC: 0.8 NG/ML (ref 0–4)
RBC # BLD AUTO: 4.52 MILLION/UL (ref 3.88–5.62)
SODIUM SERPL-SCNC: 139 MMOL/L (ref 136–145)
TRIGL SERPL-MCNC: 56 MG/DL
WBC # BLD AUTO: 6.72 THOUSAND/UL (ref 4.31–10.16)

## 2018-05-07 PROCEDURE — 86592 SYPHILIS TEST NON-TREP QUAL: CPT

## 2018-05-07 PROCEDURE — 83036 HEMOGLOBIN GLYCOSYLATED A1C: CPT

## 2018-05-07 PROCEDURE — 87591 N.GONORRHOEAE DNA AMP PROB: CPT

## 2018-05-07 PROCEDURE — 87389 HIV-1 AG W/HIV-1&-2 AB AG IA: CPT

## 2018-05-07 PROCEDURE — G0103 PSA SCREENING: HCPCS | Performed by: FAMILY MEDICINE

## 2018-05-07 PROCEDURE — 87491 CHLMYD TRACH DNA AMP PROBE: CPT

## 2018-05-07 PROCEDURE — 85025 COMPLETE CBC W/AUTO DIFF WBC: CPT

## 2018-05-07 PROCEDURE — 86803 HEPATITIS C AB TEST: CPT

## 2018-05-07 PROCEDURE — 36415 COLL VENOUS BLD VENIPUNCTURE: CPT | Performed by: FAMILY MEDICINE

## 2018-05-07 PROCEDURE — 80061 LIPID PANEL: CPT

## 2018-05-07 PROCEDURE — 80053 COMPREHEN METABOLIC PANEL: CPT

## 2018-05-08 LAB
HCV AB SER QL: NORMAL
RPR SER QL: NORMAL

## 2018-05-09 LAB — HIV 1+2 AB+HIV1 P24 AG SERPL QL IA: NORMAL

## 2018-06-20 DIAGNOSIS — I15.9 SECONDARY HYPERTENSION: Primary | ICD-10-CM

## 2018-06-20 RX ORDER — LOSARTAN POTASSIUM 50 MG/1
50 TABLET ORAL DAILY
Qty: 90 TABLET | Refills: 3 | Status: SHIPPED | OUTPATIENT
Start: 2018-06-20 | End: 2018-12-30 | Stop reason: HOSPADM

## 2018-12-25 ENCOUNTER — HOSPITAL ENCOUNTER (EMERGENCY)
Facility: HOSPITAL | Age: 58
Discharge: HOME | End: 2018-12-26
Attending: EMERGENCY MEDICINE
Payer: COMMERCIAL

## 2018-12-25 DIAGNOSIS — M54.42 ACUTE BILATERAL LOW BACK PAIN WITH BILATERAL SCIATICA: ICD-10-CM

## 2018-12-25 DIAGNOSIS — R00.0 TACHYCARDIA: ICD-10-CM

## 2018-12-25 DIAGNOSIS — Z86.69 HISTORY OF MIGRAINE: ICD-10-CM

## 2018-12-25 DIAGNOSIS — I10 ESSENTIAL HYPERTENSION: ICD-10-CM

## 2018-12-25 DIAGNOSIS — Z87.438 HISTORY OF BPH: ICD-10-CM

## 2018-12-25 DIAGNOSIS — R51.9 INTRACTABLE HEADACHE, UNSPECIFIED CHRONICITY PATTERN, UNSPECIFIED HEADACHE TYPE: Primary | ICD-10-CM

## 2018-12-25 DIAGNOSIS — M54.41 ACUTE BILATERAL LOW BACK PAIN WITH BILATERAL SCIATICA: ICD-10-CM

## 2018-12-25 DIAGNOSIS — E86.0 DEHYDRATION: ICD-10-CM

## 2018-12-25 LAB
ALBUMIN SERPL BCP-MCNC: 3.5 G/DL (ref 3.5–5)
ALP SERPL-CCNC: 78 U/L (ref 46–116)
ALT SERPL W P-5'-P-CCNC: 47 U/L (ref 12–78)
ANION GAP SERPL CALCULATED.3IONS-SCNC: 7 MMOL/L (ref 4–13)
AST SERPL W P-5'-P-CCNC: 30 U/L (ref 5–45)
BASOPHILS # BLD AUTO: 0.05 THOUSANDS/ΜL (ref 0–0.1)
BASOPHILS NFR BLD AUTO: 1 % (ref 0–1)
BILIRUB SERPL-MCNC: 0.26 MG/DL (ref 0.2–1)
BUN SERPL-MCNC: 16 MG/DL (ref 5–25)
CALCIUM SERPL-MCNC: 8.4 MG/DL (ref 8.3–10.1)
CHLORIDE SERPL-SCNC: 105 MMOL/L (ref 100–108)
CO2 SERPL-SCNC: 28 MMOL/L (ref 21–32)
CREAT SERPL-MCNC: 1.31 MG/DL (ref 0.6–1.3)
EOSINOPHIL # BLD AUTO: 0.22 THOUSAND/ΜL (ref 0–0.61)
EOSINOPHIL NFR BLD AUTO: 2 % (ref 0–6)
ERYTHROCYTE [DISTWIDTH] IN BLOOD BY AUTOMATED COUNT: 13.1 % (ref 11.6–15.1)
FLUAV AG SPEC QL IA: NEGATIVE
FLUBV AG SPEC QL IA: NEGATIVE
GFR SERPL CREATININE-BSD FRML MDRD: 59 ML/MIN/1.73SQ M
GLUCOSE SERPL-MCNC: 128 MG/DL (ref 65–140)
HCT VFR BLD AUTO: 40.4 % (ref 36.5–49.3)
HGB BLD-MCNC: 13.8 G/DL (ref 12–17)
IMM GRANULOCYTES # BLD AUTO: 0.04 THOUSAND/UL (ref 0–0.2)
IMM GRANULOCYTES NFR BLD AUTO: 0 % (ref 0–2)
LACTATE SERPL-SCNC: 1.4 MMOL/L (ref 0.5–2)
LYMPHOCYTES # BLD AUTO: 2.66 THOUSANDS/ΜL (ref 0.6–4.47)
LYMPHOCYTES NFR BLD AUTO: 28 % (ref 14–44)
MAGNESIUM SERPL-MCNC: 1.8 MG/DL (ref 1.6–2.6)
MCH RBC QN AUTO: 32.6 PG (ref 26.8–34.3)
MCHC RBC AUTO-ENTMCNC: 34.2 G/DL (ref 31.4–37.4)
MCV RBC AUTO: 96 FL (ref 82–98)
MONOCYTES # BLD AUTO: 0.53 THOUSAND/ΜL (ref 0.17–1.22)
MONOCYTES NFR BLD AUTO: 6 % (ref 4–12)
NEUTROPHILS # BLD AUTO: 5.87 THOUSANDS/ΜL (ref 1.85–7.62)
NEUTS SEG NFR BLD AUTO: 63 % (ref 43–75)
NRBC BLD AUTO-RTO: 0 /100 WBCS
PLATELET # BLD AUTO: 220 THOUSANDS/UL (ref 149–390)
PMV BLD AUTO: 9.7 FL (ref 8.9–12.7)
POTASSIUM SERPL-SCNC: 4.1 MMOL/L (ref 3.5–5.3)
PROT SERPL-MCNC: 7.2 G/DL (ref 6.4–8.2)
RBC # BLD AUTO: 4.23 MILLION/UL (ref 3.88–5.62)
SODIUM SERPL-SCNC: 140 MMOL/L (ref 136–145)
WBC # BLD AUTO: 9.37 THOUSAND/UL (ref 4.31–10.16)

## 2018-12-25 PROCEDURE — 99284 EMERGENCY DEPT VISIT MOD MDM: CPT

## 2018-12-25 PROCEDURE — 85025 COMPLETE CBC W/AUTO DIFF WBC: CPT | Performed by: NURSE PRACTITIONER

## 2018-12-25 PROCEDURE — 87040 BLOOD CULTURE FOR BACTERIA: CPT | Performed by: NURSE PRACTITIONER

## 2018-12-25 PROCEDURE — 36415 COLL VENOUS BLD VENIPUNCTURE: CPT | Performed by: NURSE PRACTITIONER

## 2018-12-25 PROCEDURE — 83605 ASSAY OF LACTIC ACID: CPT | Performed by: NURSE PRACTITIONER

## 2018-12-25 PROCEDURE — 93005 ELECTROCARDIOGRAM TRACING: CPT

## 2018-12-25 PROCEDURE — 87631 RESP VIRUS 3-5 TARGETS: CPT | Performed by: NURSE PRACTITIONER

## 2018-12-25 PROCEDURE — 83735 ASSAY OF MAGNESIUM: CPT | Performed by: NURSE PRACTITIONER

## 2018-12-25 PROCEDURE — 80053 COMPREHEN METABOLIC PANEL: CPT | Performed by: NURSE PRACTITIONER

## 2018-12-25 PROCEDURE — 96361 HYDRATE IV INFUSION ADD-ON: CPT

## 2018-12-25 PROCEDURE — 96375 TX/PRO/DX INJ NEW DRUG ADDON: CPT

## 2018-12-25 RX ORDER — HYDROMORPHONE HCL/PF 1 MG/ML
0.5 SYRINGE (ML) INJECTION ONCE
Status: COMPLETED | OUTPATIENT
Start: 2018-12-25 | End: 2018-12-25

## 2018-12-25 RX ORDER — ONDANSETRON 2 MG/ML
4 INJECTION INTRAMUSCULAR; INTRAVENOUS ONCE
Status: COMPLETED | OUTPATIENT
Start: 2018-12-25 | End: 2018-12-25

## 2018-12-25 RX ADMIN — HYDROMORPHONE HYDROCHLORIDE 0.5 MG: 1 INJECTION, SOLUTION INTRAMUSCULAR; INTRAVENOUS; SUBCUTANEOUS at 23:39

## 2018-12-25 RX ADMIN — SODIUM CHLORIDE 1000 ML: 0.9 INJECTION, SOLUTION INTRAVENOUS at 23:30

## 2018-12-25 RX ADMIN — ONDANSETRON 4 MG: 2 INJECTION INTRAMUSCULAR; INTRAVENOUS at 23:38

## 2018-12-26 ENCOUNTER — APPOINTMENT (EMERGENCY)
Dept: RADIOLOGY | Facility: HOSPITAL | Age: 58
End: 2018-12-26
Payer: COMMERCIAL

## 2018-12-26 ENCOUNTER — APPOINTMENT (EMERGENCY)
Dept: CT IMAGING | Facility: HOSPITAL | Age: 58
End: 2018-12-26
Payer: COMMERCIAL

## 2018-12-26 VITALS
OXYGEN SATURATION: 95 % | BODY MASS INDEX: 28.23 KG/M2 | RESPIRATION RATE: 21 BRPM | TEMPERATURE: 99.7 F | WEIGHT: 214 LBS | SYSTOLIC BLOOD PRESSURE: 162 MMHG | HEART RATE: 99 BPM | DIASTOLIC BLOOD PRESSURE: 82 MMHG

## 2018-12-26 PROBLEM — R91.1 PULMONARY NODULE: Status: ACTIVE | Noted: 2018-12-26

## 2018-12-26 PROBLEM — N20.0 KIDNEY STONE: Status: ACTIVE | Noted: 2018-12-26

## 2018-12-26 LAB
BACTERIA UR QL AUTO: ABNORMAL /HPF
BILIRUB UR QL STRIP: NEGATIVE
CLARITY UR: CLEAR
COLOR UR: YELLOW
GLUCOSE UR STRIP-MCNC: NEGATIVE MG/DL
HGB UR QL STRIP.AUTO: ABNORMAL
KETONES UR STRIP-MCNC: NEGATIVE MG/DL
LEUKOCYTE ESTERASE UR QL STRIP: NEGATIVE
NITRITE UR QL STRIP: NEGATIVE
NON-SQ EPI CELLS URNS QL MICRO: ABNORMAL /HPF
PH UR STRIP.AUTO: 7.5 [PH] (ref 4.5–8)
PROT UR STRIP-MCNC: NEGATIVE MG/DL
RBC #/AREA URNS AUTO: ABNORMAL /HPF
SP GR UR STRIP.AUTO: 1.02 (ref 1–1.03)
UROBILINOGEN UR QL STRIP.AUTO: 0.2 E.U./DL
WBC #/AREA URNS AUTO: ABNORMAL /HPF

## 2018-12-26 PROCEDURE — 70450 CT HEAD/BRAIN W/O DYE: CPT

## 2018-12-26 PROCEDURE — 71046 X-RAY EXAM CHEST 2 VIEWS: CPT

## 2018-12-26 PROCEDURE — 71260 CT THORAX DX C+: CPT

## 2018-12-26 PROCEDURE — 81001 URINALYSIS AUTO W/SCOPE: CPT | Performed by: NURSE PRACTITIONER

## 2018-12-26 PROCEDURE — 74177 CT ABD & PELVIS W/CONTRAST: CPT

## 2018-12-26 PROCEDURE — 96365 THER/PROPH/DIAG IV INF INIT: CPT

## 2018-12-26 PROCEDURE — 96375 TX/PRO/DX INJ NEW DRUG ADDON: CPT

## 2018-12-26 PROCEDURE — 96367 TX/PROPH/DG ADDL SEQ IV INF: CPT

## 2018-12-26 PROCEDURE — 96361 HYDRATE IV INFUSION ADD-ON: CPT

## 2018-12-26 PROCEDURE — 96366 THER/PROPH/DIAG IV INF ADDON: CPT

## 2018-12-26 RX ORDER — DIPHENHYDRAMINE HYDROCHLORIDE 50 MG/ML
25 INJECTION INTRAMUSCULAR; INTRAVENOUS ONCE
Status: COMPLETED | OUTPATIENT
Start: 2018-12-26 | End: 2018-12-26

## 2018-12-26 RX ORDER — METOPROLOL TARTRATE 5 MG/5ML
5 INJECTION INTRAVENOUS ONCE
Status: COMPLETED | OUTPATIENT
Start: 2018-12-26 | End: 2018-12-26

## 2018-12-26 RX ORDER — METHYLPREDNISOLONE SODIUM SUCCINATE 125 MG/2ML
125 INJECTION, POWDER, LYOPHILIZED, FOR SOLUTION INTRAMUSCULAR; INTRAVENOUS ONCE
Status: COMPLETED | OUTPATIENT
Start: 2018-12-26 | End: 2018-12-26

## 2018-12-26 RX ORDER — METOCLOPRAMIDE HYDROCHLORIDE 5 MG/ML
10 INJECTION INTRAMUSCULAR; INTRAVENOUS ONCE
Status: COMPLETED | OUTPATIENT
Start: 2018-12-26 | End: 2018-12-26

## 2018-12-26 RX ORDER — MAGNESIUM SULFATE HEPTAHYDRATE 40 MG/ML
2 INJECTION, SOLUTION INTRAVENOUS ONCE
Status: COMPLETED | OUTPATIENT
Start: 2018-12-26 | End: 2018-12-26

## 2018-12-26 RX ORDER — ACETAMINOPHEN 325 MG/1
650 TABLET ORAL ONCE
Status: COMPLETED | OUTPATIENT
Start: 2018-12-26 | End: 2018-12-26

## 2018-12-26 RX ADMIN — METOCLOPRAMIDE 10 MG: 5 INJECTION, SOLUTION INTRAMUSCULAR; INTRAVENOUS at 01:43

## 2018-12-26 RX ADMIN — MAGNESIUM SULFATE IN WATER 2 G: 40 INJECTION, SOLUTION INTRAVENOUS at 02:22

## 2018-12-26 RX ADMIN — SODIUM CHLORIDE 1000 ML: 0.9 INJECTION, SOLUTION INTRAVENOUS at 03:03

## 2018-12-26 RX ADMIN — SODIUM CHLORIDE 1000 ML: 0.9 INJECTION, SOLUTION INTRAVENOUS at 00:35

## 2018-12-26 RX ADMIN — DIPHENHYDRAMINE HYDROCHLORIDE 25 MG: 50 INJECTION INTRAMUSCULAR; INTRAVENOUS at 01:41

## 2018-12-26 RX ADMIN — METHYLPREDNISOLONE SODIUM SUCCINATE 125 MG: 125 INJECTION, POWDER, FOR SOLUTION INTRAMUSCULAR; INTRAVENOUS at 01:45

## 2018-12-26 RX ADMIN — METOPROLOL TARTRATE 5 MG: 5 INJECTION, SOLUTION INTRAVENOUS at 03:03

## 2018-12-26 RX ADMIN — IOHEXOL 100 ML: 350 INJECTION, SOLUTION INTRAVENOUS at 02:55

## 2018-12-26 RX ADMIN — ACETAMINOPHEN 650 MG: 325 TABLET, FILM COATED ORAL at 00:55

## 2018-12-26 RX ADMIN — CEFTRIAXONE SODIUM 2000 MG: 10 INJECTION, POWDER, FOR SOLUTION INTRAVENOUS at 01:49

## 2018-12-26 NOTE — ED NOTES
Patient unable to void   States "I am trying but nothing Is coming out "     Senthil Mckoy, RN  12/26/18 [id number]

## 2018-12-26 NOTE — ED PROVIDER NOTES
History  Chief Complaint   Patient presents with    Back Pain     Patient started with non-traumatic lower back pain, upper leg tingling, neck pain and a throbbing headache starting last night  patient states difficulty urinating but does pass all of his urine    Headache     This is a 60year old male who states last night started with neck pain that radiates up into the back of his head along with bilateral lower back pain that radiates down his buttocks into the back of his thighs  He denies any incontinence of bowel or bladder or dysuria but states like he has to void and is unable to completely urinate  He states he did get a flu shot  He denies vomiting or diarrhea or documented fever  He states he has myalgias and last aleve was at 4pm and tylenol #3 at 730pm w/o relief  Pt denies any history of back injury, injections or hx of herniated discs  States that he did have to hold onto things today to walk just because he felt unsteady  Pt denies history of BPH but is noted in pt dx history  Male partner at bedside         History provided by:  Patient and medical records   used: No    Back Pain   Location:  Lumbar spine  Quality:  Cramping  Radiates to:  L posterior upper leg and R posterior upper leg  Pain is:  Same all the time  Onset quality:  Gradual  Duration:  1 day  Timing:  Constant  Progression:  Worsening  Chronicity:  New  Relieved by:  Nothing  Ineffective treatments:  NSAIDs and narcotics  Associated symptoms: headaches and leg pain    Associated symptoms: no chest pain and no dysuria    Headache   Associated symptoms: back pain, myalgias and neck pain        Prior to Admission Medications   Prescriptions Last Dose Informant Patient Reported? Taking?    EPINEPHrine (EPIPEN) 0 3 mg/0 3 mL SOAJ   Yes No   Sig: Inject as directed   atorvastatin (LIPITOR) 10 mg tablet   No Yes   Sig: Take 1 tablet (10 mg total) by mouth daily   losartan (COZAAR) 50 mg tablet   No No Sig: Take 1 tablet (50 mg total) by mouth daily   methocarbamol (ROBAXIN) 500 mg tablet   Yes No   Sig: Take 1 tablet by mouth      Facility-Administered Medications: None       Past Medical History:   Diagnosis Date    Hyperlipidemia     Hypertension        Past Surgical History:   Procedure Laterality Date    CHOLECYSTECTOMY  1991    COLONOSCOPY      x2 - last was 2008 (neg)    NOSE SURGERY      See ENT note 2016       Family History   Problem Relation Age of Onset    Other Mother         Sarcoma    Hypertension Father      I have reviewed and agree with the history as documented  Social History   Substance Use Topics    Smoking status: Former Smoker     Quit date: 12/2014    Smokeless tobacco: Never Used    Alcohol use 4 2 oz/week     7 Glasses of wine per week      Comment: Social drinker        Review of Systems   Constitutional: Positive for chills  HENT: Negative  Eyes: Negative  Respiratory: Negative  Cardiovascular: Negative for chest pain  Gastrointestinal: Negative  Endocrine: Negative  Genitourinary: Negative for dysuria  Musculoskeletal: Positive for back pain, myalgias and neck pain  Skin: Negative  Allergic/Immunologic: Negative  Neurological: Positive for headaches  Hematological: Negative  Psychiatric/Behavioral: Negative  Physical Exam  Physical Exam   Constitutional: He is oriented to person, place, and time  He appears well-developed and well-nourished  No distress  Pt is not toxic appearing but appears not to feel well    HENT:   Head: Normocephalic and atraumatic  Right Ear: External ear normal    Left Ear: External ear normal    Nose: Nose normal    Mouth/Throat: Oropharynx is clear and moist  No oropharyngeal exudate  Eyes: Pupils are equal, round, and reactive to light  EOM are normal    Neck: Normal range of motion  Neck supple     No nuchal rigidity  FROM (but c/o pain)   Negative kernig sign  Negative brudzinski Cardiovascular: Normal rate, regular rhythm and normal heart sounds  Pulmonary/Chest: Effort normal and breath sounds normal    Abdominal: Soft  Bowel sounds are normal  He exhibits no distension  There is no tenderness  Genitourinary: Rectum normal    Genitourinary Comments: Rectal exam performed and accompanied by Dirk Gastelum  Good rectal tone  Pt denies saddle anesthesia     Musculoskeletal: Normal range of motion  Neurological: He is alert and oriented to person, place, and time  He displays normal reflexes  No cranial nerve deficit  He exhibits normal muscle tone  Coordination normal    Skin: Skin is warm and dry  Capillary refill takes less than 2 seconds  No rash noted  He is not diaphoretic  No rash noted    Psychiatric: He has a normal mood and affect  His behavior is normal  Judgment and thought content normal    Nursing note and vitals reviewed        Vital Signs  ED Triage Vitals [12/25/18 2254]   Temperature Pulse Respirations Blood Pressure SpO2   99 7 °F (37 6 °C) (!) 108 (!) 24 (!) 184/83 97 %      Temp Source Heart Rate Source Patient Position - Orthostatic VS BP Location FiO2 (%)   Oral Monitor Lying Right arm --      Pain Score       Worst Possible Pain           Vitals:    12/26/18 0125 12/26/18 0302 12/26/18 0326 12/26/18 0427   BP: 159/99 161/71 131/71 162/82   Pulse: (!) 116 (!) 116 104 99   Patient Position - Orthostatic VS: Sitting          Visual Acuity  Visual Acuity      Most Recent Value   L Pupil Size (mm)  3   R Pupil Size (mm)  3          ED Medications  Medications   sodium chloride 0 9 % bolus 1,000 mL (0 mL Intravenous Stopped 12/26/18 0030)   ondansetron (ZOFRAN) injection 4 mg (4 mg Intravenous Given 12/25/18 2338)   HYDROmorphone (DILAUDID) injection 0 5 mg (0 5 mg Intravenous Given 12/25/18 2339)   acetaminophen (TYLENOL) tablet 650 mg (650 mg Oral Given 12/26/18 0055)   sodium chloride 0 9 % bolus 1,000 mL (0 mL Intravenous Stopped 12/26/18 0139)   ceftriaxone (ROCEPHIN) 2 g/50 mL in dextrose IVPB (0 mg Intravenous Stopped 12/26/18 0220)   metoclopramide (REGLAN) injection 10 mg (10 mg Intravenous Given 12/26/18 0143)   magnesium sulfate 2 g/50 mL IVPB (premix) 2 g (0 g Intravenous Stopped 12/26/18 0416)   methylPREDNISolone sodium succinate (Solu-MEDROL) injection 125 mg (125 mg Intravenous Given 12/26/18 0145)   diphenhydrAMINE (BENADRYL) injection 25 mg (25 mg Intravenous Given 12/26/18 0141)   sodium chloride 0 9 % bolus 1,000 mL (0 mL Intravenous Stopped 12/26/18 0416)   metoprolol (LOPRESSOR) injection 5 mg (5 mg Intravenous Given 12/26/18 0303)   iohexol (OMNIPAQUE) 350 MG/ML injection (SINGLE-DOSE) 100 mL (100 mL Intravenous Given 12/26/18 0255)       Diagnostic Studies  Results Reviewed     Procedure Component Value Units Date/Time    Urine Microscopic [[id number]]  (Abnormal) Collected:  12/26/18 0054    Lab Status:  Final result Specimen:  Urine from Urine, Straight Cath Updated:  12/26/18 0120     RBC, UA 2-4 (A) /hpf      WBC, UA 0-1 (A) /hpf      Epithelial Cells None Seen /hpf      Bacteria, UA None Seen /hpf     UA w Reflex to Microscopic w Reflex to Culture [[id number]]  (Abnormal) Collected:  12/26/18 0054    Lab Status:  Final result Specimen:  Urine from Urine, Straight Cath Updated:  12/26/18 0103     Color, UA Yellow     Clarity, UA Clear     Specific Hurley, UA 1 020     pH, UA 7 5     Leukocytes, UA Negative     Nitrite, UA Negative     Protein, UA Negative mg/dl      Glucose, UA Negative mg/dl      Ketones, UA Negative mg/dl      Urobilinogen, UA 0 2 E U /dl      Bilirubin, UA Negative     Blood, UA Small (A)    Rapid Influenza Screen with Reflex PCR [[id number]]  (Normal) Collected:  12/25/18 2328    Lab Status:  Final result Specimen:  Nasopharyngeal from Nasopharyngeal Swab Updated:  12/25/18 2597     Rapid Influenza A Ag Negative     Rapid Influenza B Ag Negative    Lactic acid, plasma [[id number]]  (Normal) Collected:  12/25/18 2328    Lab Status:  Final result Specimen:  Blood from Arm, Right Updated:  12/25/18 2357     LACTIC ACID 1 4 mmol/L     Narrative:         Result may be elevated if tourniquet was used during collection  INFLUENZA A/B AND RSV, PCR [[id number]] Collected:  12/25/18 2328    Lab Status: In process Specimen:  Nasopharyngeal from Nasopharyngeal Swab Updated:  12/25/18 2357    Comprehensive metabolic panel [[id number]]  (Abnormal) Collected:  12/25/18 2328    Lab Status:  Final result Specimen:  Blood from Arm, Right Updated:  12/25/18 2353     Sodium 140 mmol/L      Potassium 4 1 mmol/L      Chloride 105 mmol/L      CO2 28 mmol/L      ANION GAP 7 mmol/L      BUN 16 mg/dL      Creatinine 1 31 (H) mg/dL      Glucose 128 mg/dL      Calcium 8 4 mg/dL      AST 30 U/L      ALT 47 U/L      Alkaline Phosphatase 78 U/L      Total Protein 7 2 g/dL      Albumin 3 5 g/dL      Total Bilirubin 0 26 mg/dL      eGFR 59 ml/min/1 73sq m     Narrative:         National Kidney Disease Education Program recommendations are as follows:  GFR calculation is accurate only with a steady state creatinine  Chronic Kidney disease less than 60 ml/min/1 73 sq  meters  Kidney failure less than 15 ml/min/1 73 sq  meters  Magnesium [[id number]]  (Normal) Collected:  12/25/18 2328    Lab Status:  Final result Specimen:  Blood from Arm, Right Updated:  12/25/18 2353     Magnesium 1 8 mg/dL     Blood culture #1 [[id number]] Collected:  12/25/18 2338    Lab Status:   In process Specimen:  Blood from Arm, Left Updated:  12/25/18 2344    CBC and differential [[id number]] Collected:  12/25/18 2328    Lab Status:  Final result Specimen:  Blood from Arm, Right Updated:  12/25/18 2337     WBC 9 37 Thousand/uL      RBC 4 23 Million/uL      Hemoglobin 13 8 g/dL      Hematocrit 40 4 %      MCV 96 fL      MCH 32 6 pg      MCHC 34 2 g/dL      RDW 13 1 %      MPV 9 7 fL      Platelets 266 Thousands/uL      nRBC 0 /100 WBCs      Neutrophils Relative 63 %      Immat GRANS % 0 % Lymphocytes Relative 28 %      Monocytes Relative 6 %      Eosinophils Relative 2 %      Basophils Relative 1 %      Neutrophils Absolute 5 87 Thousands/µL      Immature Grans Absolute 0 04 Thousand/uL      Lymphocytes Absolute 2 66 Thousands/µL      Monocytes Absolute 0 53 Thousand/µL      Eosinophils Absolute 0 22 Thousand/µL      Basophils Absolute 0 05 Thousands/µL     Blood culture #2 [[id number]] Collected:  12/25/18 2328    Lab Status: In process Specimen:  Blood from Arm, Right Updated:  12/25/18 6644                 XR chest 2 views   ED Interpretation by Danilo Arellano, Adi Jean-Baptiste (12/26 2834)   Preliminary reading   No acute process seen   Waiting on rad read       Final Result by Michelle Perdomo MD (12/26 5798)      No acute cardiopulmonary disease  Workstation performed: [id number]         CT chest abdomen pelvis w contrast   Final Result by Ronit Norman MD (12/26 1399)      No cord compression/cauda equina syndrome visualized on this CT of the chest abdomen and pelvis  Consider either dedicated small field-of-view thoracic and lumbar CTs (which can be reconstructed from this exam) and/or MRI of the thoracic/lumbar spine    with contrast as indicated clinically  2 mm nonobstructing stone interpolar region of left kidney  No hydronephrosis or hydroureter  Bladder is nondistended  Workstation performed: [id number]         CT head without contrast   Final Result by Ted Argueta DO (12/26 7465)      Chronic appearing sinus disease as described but no acute intracranial process is seen                    Workstation performed: [id number]                    Procedures  ECG 12 Lead Documentation  Date/Time: 12/25/2018 11:43 PM  Performed by: Susan Davenport  Authorized by: Susan Davenport     Indications / Diagnosis:  Fever, back pain, neck pain headache   ECG reviewed by me, the ED Provider: yes (Dr Ben Spangler )    Patient location:  ED  Previous ECG:     Previous ECG:  Unavailable Comparison to cardiac monitor: No    Interpretation:     Interpretation: abnormal    Rate:     ECG rate:  ST with frequent PVC's     ECG rate assessment: tachycardic    Rhythm:     Rhythm: sinus tachycardia and other rhythm             Phone Contacts  ED Phone Contact    ED Course  ED Course as of Dec 26 0536   Wed Dec 26, 2018   0006 GFR baseline 0 88 to 1 07 today 1 31  RID0 75      0013 Rapid flu negative  CMP unremarkable  Waiting on CXR        0027 Labs and cxr results reviewed and discussed with pt  Waiting on urine  0661 (Preliminary result) XR CHEST PA & LATERAL    0029 XR chest 2 views    0040 Bladder scan 308 prevoid - will have RN cath pt       0041  UA W REFLEX TO MICROSCOPIC WITH REFLEX TO CULTURE    0121 Urine negative for infection  Will admit for SIRS, dehydration, RIGOBERTO, viral syndrome  Pt agrees with POC      0128 Temp 99 7   - tachycardia  Will give Rocephin IV single dose  Prebladder was 308 however 500 straight cath urine removed  Pt has not voided since  0141 Pt meets SIRS criteria  9704 Page to [facility] for admission     0214 ED Prov Note filed by STEVE Sevilla    4729 9[address] [facility], [place] HEAD [email or web address]    [id number] Spoke with KIMBERLY regarding admission  Donn Cobian has refused pt  Would like to see if migraine cocktail works and give another IVF and reassess HR and voiding statues  CT A/P and lumbar spine to see if needs MRI for quada equina vs BPH  Informed pt and agrees with POC  Pt sates that his headache does feel better but still needs IV mag  Will give IVF #3      7296 CT head   IMPRESSION:     Chronic appearing sinus disease as described but no acute intracranial process is seen  9298 IMPRESSION:     No cord compression/cauda equina syndrome visualized on this CT of the chest abdomen and pelvis    Consider either dedicated small field-of-view thoracic and lumbar CTs (which can be reconstructed from this exam) and/or MRI of the thoracic/lumbar spine   with contrast as indicated clinically      2 mm nonobstructing stone interpolar region of left kidney  No hydronephrosis or hydroureter      Bladder is nondistended  4253 CT C/A/P negative for acute process  RN Yasmeen Iniguez states she had assessed pt at shift change and pt denied any pain  She states that pt is currently sleeping  Informed RN to give pt 20 minutes, have pt get up and void and will assess for discharge  0345 Pt looks much improved  He states he has no more pain  Pt states he would like to void  Pt has voided 400 cc on own and states doesn't feel like he is retaining any urine  Will discharge with follow up  Pt verbalizes understanding of d/c instructions with follow up     0423 Vital signs improved  Pt ambulated through [address]  I have discussed with pt need for follow up with PCP and need for further testing if back pain returns as he may need an MRI      0425    0425    0425    0500 ED Disposition set to Discharge    0500                          Initial Sepsis Screening     [address] Name 12/26/18 0123                Is the patient's history suggestive of a new or worsening infection? (!)  Yes (Proceed)  -KF        Suspected source of infection suspect infection, source unknown  -KF        Are two or more of the following signs & symptoms of infection both present and new to the patient? (!)  Yes (Proceed)  -KF        Indicate SIRS criteria Tachycardia > 90 bpm;Tachypnea > 20 resp per min  -KF        If the answer is yes to both questions, suspicion of sepsis is present          If severe sepsis is present AND tissue hypoperfusion perists in the hour after fluid resuscitation or lactate > 4, the patient meets criteria for SEPTIC SHOCK          Are any of the following organ dysfunction criteria present within 6 hours of suspected infection and SIRS criteria that are NOT considered to be chronic conditions?  (!)  Yes  -KF        Organ dysfunction Creatinine > 0 5 mg/dl ABOVE BASELINE  -KF        Date of presentation of severe sepsis          Time of presentation of severe sepsis          Tissue hypoperfusion persists in the hour after crystalloid fluid administration, evidenced, by either:          Was hypotension present within one hour of the conclusion of crystalloid fluid administration?         Date of presentation of septic shock          Time of presentation of septic shock            User Key  (r) = Recorded By, (t) = Taken By, (c) = Cosigned By    [address] Name Provider Type    KF Mozelle Amelia, [address] Nurse Practitioner                  MDM  Number of Diagnoses or Management Options  Acute bilateral low back pain with bilateral sciatica:   RIGOBERTO (acute kidney injury) [facility]):   Dehydration:   Essential hypertension:   Intractable headache, unspecified chronicity pattern, unspecified headache type:    Tachycardia:   Diagnosis management comments: Differential diagnosis  Viral illness  Influenza  Urinary retention - UTI    Neuroleptic malignant syndrome - pt is not on any antipsychotic medications   Doubt meningitis - negative kernig and brudzinski signs  Cauda equina   Urinary retention vs dehydration      Plan  Labs  IVF  Pain control  Urine  Bladder scan  CXR vs CT  Flu swab  Rectal exam    Admit            Amount and/or Complexity of Data Reviewed  Clinical lab tests: ordered and reviewed  Tests in the radiology section of CPT®: ordered and reviewed  Review and summarize past medical records: yes  Discuss the patient with other providers: yes (  Dr Lilly Avitia discussed assessment and differential diagnosis and testing results, POC  )      CritCare Time    Disposition  Final diagnoses:   Tachycardia   Intractable headache, unspecified chronicity pattern, unspecified headache type - resolved   Dehydration   Acute bilateral low back pain with bilateral sciatica   Essential hypertension   History of BPH   History of migraine     Time reflects when diagnosis was documented in both MDM as applicable and the Disposition within this note     Time User Action Codes Description Comment    12/26/2018  1:31 AM Yevonne Reas Add [R00 0] Tachycardia     12/26/2018  1:33 AM Yevonne Reas Add [R51] Intractable headache, unspecified chronicity pattern, unspecified headache type     12/26/2018  1:33 AM Yevonne Reas Add [N17 9] RIGOBERTO (acute kidney injury) ([facility] [address] )     12/26/2018  1:33 AM Yevonne Reas Add [E86 0] Dehydration     12/26/2018  1:34 AM Yevonne Reas Add [N53 74,  M54 41] Acute bilateral low back pain with bilateral sciatica     12/26/2018  1:34 AM Yevonne Reas Modify [R00 0] Tachycardia     12/26/2018  1:34 AM Yevonne Reas Modify [E86 0] Dehydration     12/26/2018  1:35 AM Yevonne Reas Add [I10] Essential hypertension     12/26/2018  1:49 AM Yevonne Reas Modify [R51] Intractable headache, unspecified chronicity pattern, unspecified headache type     12/26/2018  1:49 AM Yevonne Reas Modify [E86 0] Dehydration     12/26/2018  1:49 AM Yevonne Reas Add [Z87 438] History of BPH     12/26/2018  1:49 AM Yevonne Reas Add [R33 9] Urinary retention     12/26/2018  1:49 AM Yevonne Reas Add [Z86 69] History of migraine     12/26/2018  3:43 AM Yevonne Reas Remove [R33 9] Urinary retention     12/26/2018  3:43 AM Yevonne Reas Remove [N17 9] RIGOBERTO (acute kidney injury) ([facility] [address] )     12/26/2018  3:43 AM Yevonne Reas Modify [R51] Intractable headache, unspecified chronicity pattern, unspecified headache type resolved      ED Disposition     ED Disposition Condition Comment    Discharge  Antoniette Glow discharge to home/self care  Condition at discharge: Good        Follow-up Information     Follow up With Specialties Details Why Contact Info Additional Information    Sharmin Clayton DO Family Medicine Schedule an appointment as soon as possible for a visit in 2 days  [address] RADHA MILLER  [facility] Emergency Department Emergency Medicine  If symptoms worsen 0280 [facility]  [phone] 2210 [facility] ED, [address]  , [place], [place], [address]          Discharge Medication List as of 12/26/2018  4:25 AM      CONTINUE these medications which have NOT CHANGED    Details   atorvastatin (LIPITOR) 10 mg tablet Take 1 tablet (10 mg total) by mouth daily, Starting Fri 5/4/2018, Normal      EPINEPHrine (EPIPEN) 0 3 mg/0 3 mL SOAJ Inject as directed, Starting Sun 5/31/2015, Historical Med      losartan (COZAAR) 50 mg tablet Take 1 tablet (50 mg total) by mouth daily, Starting Wed 6/20/2018, Normal      methocarbamol (ROBAXIN) 500 mg tablet Take 1 tablet by mouth, Starting Mon 9/11/2017, Historical Med           No discharge procedures on file      ED Provider  Electronically Signed by           STEVE Koch  12/25/18 [address] [place] [address], [address] [place] [address]  12/26/18 [address] [place] [address]  12/26/18 3595

## 2018-12-26 NOTE — DISCHARGE INSTRUCTIONS
You are to stay hydrated  If you are unable to void on your own, you are to return to the ED  Take tylenol for pain  Follow up with your PCP  It is important that you follow up with your PCP regarding your back pain and not to continue to take medication and mask the pain as you may need an MRI  Acute Low Back Pain   WHAT YOU NEED TO KNOW:   Acute low back pain is sudden discomfort in your lower back area that lasts for up to 6 weeks  The discomfort makes it difficult to tolerate activity  DISCHARGE INSTRUCTIONS:   Return to the emergency department if:   · You have severe pain  · You have sudden stiffness and heaviness on both buttocks down to both legs  · You have numbness or weakness in one leg, or pain in both legs  · You have numbness in your genital area or across your lower back  · You cannot control your urine or bowel movements  Contact your healthcare provider if:   · You have a fever  · You have pain at night or when you rest     · Your pain does not get better with treatment  · You have pain that worsens when you cough or sneeze  · You suddenly feel something pop or snap in your back  · You have questions or concerns about your condition or care  Medicines: The following medicines may be ordered by your healthcare provider:  · Acetaminophen  decreases pain  It is available without a doctor's order  Ask how much to take and how often to take it  Follow directions  Acetaminophen can cause liver damage if not taken correctly  · NSAIDs  help decrease swelling and pain  This medicine is available with or without a doctor's order  NSAIDs can cause stomach bleeding or kidney problems in certain people  If you take blood thinner medicine, always ask your healthcare provider if NSAIDs are safe for you  Always read the medicine label and follow directions  · Prescription pain medicine  may be given   Ask your healthcare provider how to take this medicine safely  · Muscle relaxers  decrease pain by relaxing the muscles in your lower spine  · Take your medicine as directed  Contact your healthcare provider if you think your medicine is not helping or if you have side effects  Tell him of her if you are allergic to any medicine  Keep a list of the medicines, vitamins, and herbs you take  Include the amounts, and when and why you take them  Bring the list or the pill bottles to follow-up visits  Carry your medicine list with you in case of an emergency  Self-care:   · Stay active  as much as you can without causing more pain  Bed rest could make your back pain worse  Start with some light exercises such as walking  Avoid heavy lifting until your pain is gone  Ask for more information about the activities or exercises that are right for you  · Ice  helps decrease swelling, pain, and muscle spams  Put crushed ice in a plastic bag  Cover it with a towel  Place it on your lower back for 20 to 30 minutes every 2 hours  Do this for about 2 to 3 days after your pain starts, or as directed  · Heat  helps decrease pain and muscle spasms  Start to use heat after treatment with ice has stopped  Use a small towel dampened with warm water or a heating pad, or sit in a warm bath  Apply heat on the area for 20 to 30 minutes every 2 hours for as many days as directed  Alternate heat and ice  Prevent acute low back pain:   · Use proper body mechanics  ¨ Bend at the hips and knees when you  objects  Do not bend from the waist  Use your leg muscles as you lift the load  Do not use your back  Keep the object close to your chest as you lift it  Try not to twist or lift anything above your waist     ¨ Change your position often when you stand for long periods of time  Rest one foot on a small box or footrest, and then switch to the other foot often  ¨ Try not to sit for long periods of time   When you do, sit in a straight-backed chair with your feet flat on the floor  Never reach, pull, or push while you are sitting  · Do exercises that strengthen your back muscles  Warm up before you exercise  Ask your healthcare provider the best exercises for you  · Maintain a healthy weight  Ask your healthcare provider how much you should weigh  Ask him to help you create a weight loss plan if you are overweight  Follow up with your healthcare provider as directed:  Return for a follow-up visit if you still have pain after 1 to 3 weeks of treatment  You may need to visit an orthopedist if your back pain lasts more than 12 weeks  Write down your questions so you remember to ask them during your visits  © 2017 [address]  Information is for End User's use only and may not be sold, redistributed or otherwise used for commercial purposes  All illustrations and images included in CareNotes® are the copyrighted property of A D [employer] M [employer]  or [employer]  The above information is an  only  It is not intended as medical advice for individual conditions or treatments  Talk to your doctor, nurse or pharmacist before following any medical regimen to see if it is safe and effective for you  Dehydration   WHAT YOU NEED TO KNOW:   Dehydration is a condition that develops when your body does not have enough fluid  You may become dehydrated if you do not drink enough water or lose too much fluid  Fluid loss may also cause loss of electrolytes (minerals), such as sodium  DISCHARGE INSTRUCTIONS:   Return to the emergency department if:   · You have a seizure  · You are confused or cannot think clearly  · You are extremely sleepy, or another person cannot wake you  · You become dizzy or faint when you stand  · You are not able to urinate  · You have trouble breathing  · You have a fast or irregular heartbeat  · Your hands or feet are cold, or your face is pale    Contact your healthcare provider if:   · You have trouble drinking liquids because you are vomiting  · Your symptoms get worse  · You have a fever  · You feel very weak or tired  · You have questions or concerns about your condition or care  Follow up with your healthcare provider as directed:  Write down your questions so you remember to ask them during your visits  Prevent or manage dehydration:   · Drink liquids as directed  Liquids that contain water, sugar, and minerals can help your body hold in fluid and help prevent dehydration  Drink liquids throughout the day, not just when you feel thirsty  Men should drink about 3 liters (13 eight-ounce cups) of liquid each day  Women should drink about 2 liters (9 eight-ounce cups) of liquid each day  Drink even more liquid if you will be outdoors, in the sun for a long time, or exercising  · Stay cool  Limit the time you spend outdoors during the hottest part of the day  Dress in lightweight clothes  · Keep track of how often you urinate  If you urinate less than usual or your urine is darker, drink more liquids  © 2017 [address] [facility] Information is for End User's use only and may not be sold, redistributed or otherwise used for commercial purposes  All illustrations and images included in CareNotes® are the copyrighted property of [employer] M [employer]  or [employer]  The above information is an  only  It is not intended as medical advice for individual conditions or treatments  Talk to your doctor, nurse or pharmacist before following any medical regimen to see if it is safe and effective for you  Acute Headache, Ambulatory Care   GENERAL INFORMATION:   An acute headache  is pain or discomfort that starts suddenly and gets worse quickly  The cause of an acute headache may not be known  It may be triggered by stress, fatigue, hormones, food, or trauma    Common related symptoms include the following:   · Fever    · Sinus pressure    · Loss of memory    · Nausea or vomiting    · Problems with your vision, such as watery or red eyes, loss of vision, or pain in bright light    · Stiff neck    · Tenderness of the head and neck area    · Trouble staying awake, or being less alert than usual     · Weakness or less energy  Seek immediate care for the following symptoms:   · Severe pain    · A headache that occurs after a blow to the head, a fall, or other trauma     · Confusion or forgetfulness    · Numbness on one side of your face or body  Treatment for an acute headache  may include medicine to decrease pain  You may also need biofeedback or cognitive behavioral therapy  Ask your healthcare provider about these and other treatments for an acute headache  Manage my symptoms:   · Apply heat  on your head for 20 to 30 minutes every 2 hours for as many days as directed  Heat helps decrease pain and muscle spasms  You may alternate heat and ice  · Apply ice  on your head for 15 to 20 minutes every hour or as directed  Use an ice pack, or put crushed ice in a plastic bag  Cover it with a towel  Ice helps decrease pain  · Relax your muscles  Lie down in a comfortable position and close your eyes  Relax your muscles slowly  Start at your toes and work your way up your body  · Keep a record of your headaches  Write down when your headaches start and stop  Include your symptoms and what you were doing when the headache began  Record what you ate or drank for 24 hours before the headache started  Describe the pain and where it hurts  Keep track of what you did to treat your headache and whether it worked  Follow up with your healthcare provider as directed:  Bring your headache record with you when you see your healthcare provider  Write down your questions so you remember to ask them during your visits  CARE AGREEMENT:   You have the right to help plan your care  Learn about your health condition and how it may be treated   Discuss treatment options with your caregivers to decide what care you want to receive  You always have the right to refuse treatment  The above information is an  only  It is not intended as medical advice for individual conditions or treatments  Talk to your doctor, nurse or pharmacist before following any medical regimen to see if it is safe and effective for you  © 2014 [address] is for End User's use only and may not be sold, redistributed or otherwise used for commercial purposes  All illustrations and images included in CareNotes® are the copyrighted property of A D A M [employer]  or [employer]

## 2018-12-27 LAB
ATRIAL RATE: 116 BPM
FLUAV AG SPEC QL: NORMAL
FLUBV AG SPEC QL: NORMAL
P AXIS: 76 DEGREES
PR INTERVAL: 128 MS
QRS AXIS: 50 DEGREES
QRSD INTERVAL: 88 MS
QT INTERVAL: 292 MS
QTC INTERVAL: 405 MS
RSV B RNA SPEC QL NAA+PROBE: NORMAL
T WAVE AXIS: 52 DEGREES
VENTRICULAR RATE: 116 BPM

## 2018-12-27 PROCEDURE — 93010 ELECTROCARDIOGRAM REPORT: CPT | Performed by: INTERNAL MEDICINE

## 2018-12-28 ENCOUNTER — OFFICE VISIT (OUTPATIENT)
Dept: FAMILY MEDICINE CLINIC | Facility: CLINIC | Age: 58
End: 2018-12-28
Payer: COMMERCIAL

## 2018-12-28 ENCOUNTER — HOSPITAL ENCOUNTER (INPATIENT)
Facility: HOSPITAL | Age: 58
LOS: 2 days | Discharge: HOME | DRG: 308 | End: 2018-12-30
Attending: EMERGENCY MEDICINE | Admitting: FAMILY MEDICINE
Payer: COMMERCIAL

## 2018-12-28 ENCOUNTER — APPOINTMENT (EMERGENCY)
Dept: RADIOLOGY | Facility: HOSPITAL | Age: 58
DRG: 308 | End: 2018-12-28
Payer: COMMERCIAL

## 2018-12-28 VITALS
WEIGHT: 209.2 LBS | BODY MASS INDEX: 27.73 KG/M2 | HEART RATE: 152 BPM | OXYGEN SATURATION: 97 % | SYSTOLIC BLOOD PRESSURE: 98 MMHG | HEIGHT: 73 IN | DIASTOLIC BLOOD PRESSURE: 70 MMHG

## 2018-12-28 DIAGNOSIS — I47.1 SVT (SUPRAVENTRICULAR TACHYCARDIA) (HCC): Primary | ICD-10-CM

## 2018-12-28 DIAGNOSIS — I10 ESSENTIAL HYPERTENSION: ICD-10-CM

## 2018-12-28 DIAGNOSIS — R51.9 NONINTRACTABLE HEADACHE, UNSPECIFIED CHRONICITY PATTERN, UNSPECIFIED HEADACHE TYPE: ICD-10-CM

## 2018-12-28 DIAGNOSIS — M54.41 BILATERAL LOW BACK PAIN WITH BILATERAL SCIATICA, UNSPECIFIED CHRONICITY: ICD-10-CM

## 2018-12-28 DIAGNOSIS — M54.42 BILATERAL LOW BACK PAIN WITH BILATERAL SCIATICA, UNSPECIFIED CHRONICITY: ICD-10-CM

## 2018-12-28 DIAGNOSIS — I48.92 ATRIAL FLUTTER WITH RAPID VENTRICULAR RESPONSE (HCC): Primary | ICD-10-CM

## 2018-12-28 DIAGNOSIS — I48.92 ATRIAL FLUTTER, UNSPECIFIED TYPE (HCC): ICD-10-CM

## 2018-12-28 PROBLEM — I50.31 ACUTE DIASTOLIC (CONGESTIVE) HEART FAILURE (HCC): Status: ACTIVE | Noted: 2018-12-28

## 2018-12-28 PROBLEM — I47.10 SVT (SUPRAVENTRICULAR TACHYCARDIA): Status: ACTIVE | Noted: 2018-12-28

## 2018-12-28 LAB
ANION GAP SERPL CALCULATED.3IONS-SCNC: 10 MMOL/L (ref 4–13)
BASOPHILS # BLD AUTO: 0.04 THOUSANDS/ΜL (ref 0–0.1)
BASOPHILS NFR BLD AUTO: 0 % (ref 0–1)
BUN SERPL-MCNC: 22 MG/DL (ref 5–25)
CALCIUM SERPL-MCNC: 8.7 MG/DL (ref 8.3–10.1)
CHLORIDE SERPL-SCNC: 104 MMOL/L (ref 100–108)
CO2 SERPL-SCNC: 25 MMOL/L (ref 21–32)
CREAT SERPL-MCNC: 1.1 MG/DL (ref 0.6–1.3)
EOSINOPHIL # BLD AUTO: 0.1 THOUSAND/ΜL (ref 0–0.61)
EOSINOPHIL NFR BLD AUTO: 1 % (ref 0–6)
ERYTHROCYTE [DISTWIDTH] IN BLOOD BY AUTOMATED COUNT: 13.3 % (ref 11.6–15.1)
GFR SERPL CREATININE-BSD FRML MDRD: 73 ML/MIN/1.73SQ M
GLUCOSE SERPL-MCNC: 93 MG/DL (ref 65–140)
HCT VFR BLD AUTO: 42.9 % (ref 36.5–49.3)
HGB BLD-MCNC: 14.9 G/DL (ref 12–17)
IMM GRANULOCYTES # BLD AUTO: 0.05 THOUSAND/UL (ref 0–0.2)
IMM GRANULOCYTES NFR BLD AUTO: 0 % (ref 0–2)
LYMPHOCYTES # BLD AUTO: 4.47 THOUSANDS/ΜL (ref 0.6–4.47)
LYMPHOCYTES NFR BLD AUTO: 40 % (ref 14–44)
MAGNESIUM SERPL-MCNC: 1.8 MG/DL (ref 1.6–2.6)
MCH RBC QN AUTO: 32.8 PG (ref 26.8–34.3)
MCHC RBC AUTO-ENTMCNC: 34.7 G/DL (ref 31.4–37.4)
MCV RBC AUTO: 95 FL (ref 82–98)
MONOCYTES # BLD AUTO: 0.88 THOUSAND/ΜL (ref 0.17–1.22)
MONOCYTES NFR BLD AUTO: 8 % (ref 4–12)
NEUTROPHILS # BLD AUTO: 5.76 THOUSANDS/ΜL (ref 1.85–7.62)
NEUTS SEG NFR BLD AUTO: 51 % (ref 43–75)
NRBC BLD AUTO-RTO: 0 /100 WBCS
NT-PROBNP SERPL-MCNC: 2398 PG/ML
PLATELET # BLD AUTO: 260 THOUSANDS/UL (ref 149–390)
PMV BLD AUTO: 10 FL (ref 8.9–12.7)
POTASSIUM SERPL-SCNC: 4 MMOL/L (ref 3.5–5.3)
RBC # BLD AUTO: 4.54 MILLION/UL (ref 3.88–5.62)
SODIUM SERPL-SCNC: 139 MMOL/L (ref 136–145)
T4 FREE SERPL-MCNC: 0.96 NG/DL (ref 0.76–1.46)
TROPONIN I SERPL-MCNC: 0.03 NG/ML
TSH SERPL DL<=0.05 MIU/L-ACNC: 3.03 UIU/ML (ref 0.36–3.74)
WBC # BLD AUTO: 11.3 THOUSAND/UL (ref 4.31–10.16)

## 2018-12-28 PROCEDURE — 36415 COLL VENOUS BLD VENIPUNCTURE: CPT | Performed by: EMERGENCY MEDICINE

## 2018-12-28 PROCEDURE — 80048 BASIC METABOLIC PNL TOTAL CA: CPT | Performed by: EMERGENCY MEDICINE

## 2018-12-28 PROCEDURE — 83735 ASSAY OF MAGNESIUM: CPT | Performed by: EMERGENCY MEDICINE

## 2018-12-28 PROCEDURE — 84443 ASSAY THYROID STIM HORMONE: CPT | Performed by: EMERGENCY MEDICINE

## 2018-12-28 PROCEDURE — 99223 1ST HOSP IP/OBS HIGH 75: CPT | Performed by: FAMILY MEDICINE

## 2018-12-28 PROCEDURE — 71045 X-RAY EXAM CHEST 1 VIEW: CPT

## 2018-12-28 PROCEDURE — 84439 ASSAY OF FREE THYROXINE: CPT | Performed by: EMERGENCY MEDICINE

## 2018-12-28 PROCEDURE — 84484 ASSAY OF TROPONIN QUANT: CPT | Performed by: EMERGENCY MEDICINE

## 2018-12-28 PROCEDURE — 93005 ELECTROCARDIOGRAM TRACING: CPT

## 2018-12-28 PROCEDURE — 96361 HYDRATE IV INFUSION ADD-ON: CPT

## 2018-12-28 PROCEDURE — 1036F TOBACCO NON-USER: CPT | Performed by: FAMILY MEDICINE

## 2018-12-28 PROCEDURE — 99285 EMERGENCY DEPT VISIT HI MDM: CPT

## 2018-12-28 PROCEDURE — 83880 ASSAY OF NATRIURETIC PEPTIDE: CPT | Performed by: EMERGENCY MEDICINE

## 2018-12-28 PROCEDURE — 1111F DSCHRG MED/CURRENT MED MERGE: CPT | Performed by: FAMILY MEDICINE

## 2018-12-28 PROCEDURE — 96374 THER/PROPH/DIAG INJ IV PUSH: CPT

## 2018-12-28 PROCEDURE — 99214 OFFICE O/P EST MOD 30 MIN: CPT | Performed by: FAMILY MEDICINE

## 2018-12-28 PROCEDURE — 93000 ELECTROCARDIOGRAM COMPLETE: CPT | Performed by: FAMILY MEDICINE

## 2018-12-28 PROCEDURE — 85025 COMPLETE CBC W/AUTO DIFF WBC: CPT | Performed by: EMERGENCY MEDICINE

## 2018-12-28 RX ORDER — DILTIAZEM HYDROCHLORIDE 5 MG/ML
INJECTION INTRAVENOUS
Status: COMPLETED
Start: 2018-12-28 | End: 2018-12-28

## 2018-12-28 RX ORDER — ADENOSINE 3 MG/ML
INJECTION, SOLUTION INTRAVENOUS
Status: COMPLETED
Start: 2018-12-28 | End: 2018-12-28

## 2018-12-28 RX ORDER — DILTIAZEM HYDROCHLORIDE 5 MG/ML
20 INJECTION INTRAVENOUS ONCE
Status: COMPLETED | OUTPATIENT
Start: 2018-12-28 | End: 2018-12-28

## 2018-12-28 RX ORDER — METHOCARBAMOL 500 MG/1
500 TABLET, FILM COATED ORAL EVERY 6 HOURS PRN
Status: DISCONTINUED | OUTPATIENT
Start: 2018-12-28 | End: 2018-12-30 | Stop reason: HOSPADM

## 2018-12-28 RX ORDER — ONDANSETRON 2 MG/ML
4 INJECTION INTRAMUSCULAR; INTRAVENOUS EVERY 6 HOURS PRN
Status: DISCONTINUED | OUTPATIENT
Start: 2018-12-28 | End: 2018-12-30 | Stop reason: HOSPADM

## 2018-12-28 RX ORDER — ACETAMINOPHEN 325 MG/1
650 TABLET ORAL EVERY 6 HOURS PRN
Status: DISCONTINUED | OUTPATIENT
Start: 2018-12-28 | End: 2018-12-30 | Stop reason: HOSPADM

## 2018-12-28 RX ORDER — LOSARTAN POTASSIUM 50 MG/1
50 TABLET ORAL DAILY
Status: DISCONTINUED | OUTPATIENT
Start: 2018-12-29 | End: 2018-12-29

## 2018-12-28 RX ORDER — ATORVASTATIN CALCIUM 10 MG/1
10 TABLET, FILM COATED ORAL
Status: DISCONTINUED | OUTPATIENT
Start: 2018-12-28 | End: 2018-12-30 | Stop reason: HOSPADM

## 2018-12-28 RX ADMIN — SODIUM CHLORIDE 1000 ML: 0.9 INJECTION, SOLUTION INTRAVENOUS at 15:12

## 2018-12-28 RX ADMIN — DILTIAZEM HYDROCHLORIDE 30 MG: 30 TABLET, FILM COATED ORAL at 23:48

## 2018-12-28 RX ADMIN — SODIUM CHLORIDE 1000 ML: 0.9 INJECTION, SOLUTION INTRAVENOUS at 15:55

## 2018-12-28 RX ADMIN — DILTIAZEM HYDROCHLORIDE 30 MG: 30 TABLET, FILM COATED ORAL at 20:01

## 2018-12-28 RX ADMIN — DILTIAZEM HYDROCHLORIDE 20 MG: 5 INJECTION INTRAVENOUS at 15:17

## 2018-12-28 RX ADMIN — ADENOSINE 6 MG: 3 INJECTION, SOLUTION INTRAVENOUS at 15:14

## 2018-12-28 RX ADMIN — ATORVASTATIN CALCIUM 10 MG: 10 TABLET, FILM COATED ORAL at 20:01

## 2018-12-28 RX ADMIN — DILTIAZEM HYDROCHLORIDE 20 MG: 5 INJECTION INTRAVENOUS at 15:37

## 2018-12-28 NOTE — PATIENT INSTRUCTIONS
We reviewed his emergency room visit from December 25th, his headache/back pain is essentially resolved but EKG redone here today shows SVT with rate 170  I would like him back at the emergency room, needs observation/further evaluation  Should have redo TSH, last was done 2016  Can have Cardiology consultation, further workup there  Stable to go by car, his partner will drive him      Future redo UA  ( 2-4 RBC at ER was obtained via st cath )

## 2018-12-28 NOTE — LETTER
[address] [place] [address]  Dept: [phone]    December 30, 2018     Patient: Anahi Montoya   YOB: 1960   Date of Visit: 12/28/2018       To Whom it May Concern:    Aisha Marrero is under my professional care  He was seen in the hospital from 12/28/2018   to 12/30/18  He may return to work on Monday, 1/7/2019  If you have any questions or concerns, please don't hesitate to call           Sincerely,          STEVE Cee

## 2018-12-28 NOTE — ED PROVIDER NOTES
History  Chief Complaint   Patient presents with    Rapid Heart Rate     Patient arrives from PCP referred to ED for SVT  Rate in the 170's on EKG performed at office  Patient reports feeling palpitations  Denies shortness of breath, dizziness, chest pain  44-year-old man with a history of hypertension and hyperlipidemia presents for evaluation of tachycardia  Patient was seen here on 12/25 for headache, neck pain, and back pain  Patient had an unremarkable workup including CT head/chest/abdomen/pelvis  He was noted to be in sinus tachycardia  Patient followed up with his PCP today who found him in what appeared to be SVT with a rate in the 170s and was sent in for evaluation  Patient reports intermittent palpitations and lightheadedness over the past 1 week  No syncope, chest pain, or dyspnea  No history of cardiac disease or abnormal rhythm  Patient does admit to drinking more alcohol over the holidays than he normally does  On arrival, patient is tachycardic in the 170s with a normal mental status and blood pressure  Initial EKG shows what appears to be SVT  Patient was given 6 mg of adenosine after being placed on the defibrillator and IV established  Underlying rhythm appears to be a flutter with rapid ventricular response  Will give patient IV Cardizem bolus followed by drip, if needed  Will perform cardiac workup and admit patient for further management  Prior to Admission Medications   Prescriptions Last Dose Informant Patient Reported? Taking?    EPINEPHrine (EPIPEN) 0 3 mg/0 3 mL SOAJ   Yes Yes   Sig: Inject as directed   atorvastatin (LIPITOR) 10 mg tablet   No Yes   Sig: Take 1 tablet (10 mg total) by mouth daily   losartan (COZAAR) 50 mg tablet   No Yes   Sig: Take 1 tablet (50 mg total) by mouth daily   methocarbamol (ROBAXIN) 500 mg tablet   Yes Yes   Sig: Take 1 tablet by mouth      Facility-Administered Medications: None       Past Medical History:   Diagnosis Date    Hyperlipidemia     Hypertension        Past Surgical History:   Procedure Laterality Date    CHOLECYSTECTOMY  1991    COLONOSCOPY      x2 - last was 2008 (neg)    NOSE SURGERY      See ENT note 2016       Family History   Problem Relation Age of Onset    Other Mother         Sarcoma    Hypertension Father      I have reviewed and agree with the history as documented  Social History   Substance Use Topics    Smoking status: Former Smoker     Quit date: 12/2014    Smokeless tobacco: Never Used    Alcohol use 4 2 oz/week     7 Glasses of wine per week      Comment: Social drinker        Review of Systems   Constitutional: Negative for chills and fever  HENT: Negative for rhinorrhea and sore throat  Eyes: Negative for photophobia and visual disturbance  Respiratory: Negative for cough and shortness of breath  Cardiovascular: Positive for palpitations  Negative for chest pain and leg swelling  Gastrointestinal: Negative for abdominal pain, diarrhea, nausea and vomiting  Genitourinary: Negative for dysuria, frequency and hematuria  Musculoskeletal: Negative for back pain and neck pain  Skin: Negative for rash and wound  Neurological: Positive for light-headedness  Negative for syncope and headaches  Physical Exam  ED Triage Vitals [12/28/18 1501]   Temperature Pulse Respirations Blood Pressure SpO2   98 2 °F (36 8 °C) (!) 170 20 113/69 98 %      Temp Source Heart Rate Source Patient Position - Orthostatic VS BP Location FiO2 (%)   Temporal Monitor Sitting Right arm --      Pain Score       No Pain           Orthostatic Vital Signs  Vitals:    12/29/18 1119 12/29/18 1614 12/29/18 1900 12/29/18 2300   BP: 124/89 126/82 123/81 142/91   Pulse: 76 70 81 82   Patient Position - Orthostatic VS: Sitting Lying Lying Sitting       Physical Exam   Constitutional: He is oriented to person, place, and time  He appears well-developed and well-nourished  No distress     HENT:   Head: Normocephalic and atraumatic  Eyes: Pupils are equal, round, and reactive to light  Conjunctivae are normal  No scleral icterus  Neck: Neck supple  No JVD present  Cardiovascular: Normal heart sounds  Exam reveals no gallop and no friction rub  No murmur heard  Tachycardic, irregular rhythm   Pulmonary/Chest: Effort normal and breath sounds normal  No respiratory distress  He has no wheezes  He has no rales  Abdominal: Soft  He exhibits no distension  There is no tenderness  There is no rebound and no guarding  Musculoskeletal: He exhibits no edema or tenderness  Neurological: He is alert and oriented to person, place, and time  Skin: Skin is warm and dry  He is not diaphoretic  Psychiatric: He has a normal mood and affect  His behavior is normal  Thought content normal    Vitals reviewed        ED Medications  Medications   diltiazem (CARDIZEM) 125 mg in sodium chloride 0 9 % 125 mL infusion (0 mg/hr Intravenous Hold 12/28/18 1554)   atorvastatin (LIPITOR) tablet 10 mg (10 mg Oral Given 12/29/18 1803)   methocarbamol (ROBAXIN) tablet 500 mg (not administered)   ondansetron (ZOFRAN) injection 4 mg (not administered)   acetaminophen (TYLENOL) tablet 650 mg (not administered)   apixaban (ELIQUIS) tablet 5 mg (5 mg Oral Given 12/29/18 1803)   diltiazem (CARDIZEM CD) 24 hr capsule 120 mg (not administered)   losartan (COZAAR) tablet 25 mg (not administered)   adenosine (ADENOCARD) 6 mg/2 mL injection **ADS Override Pull** (6 mg  Given 12/28/18 1514)   diltiazem (CARDIZEM) injection 20 mg (20 mg Intravenous Given 12/28/18 1517)   sodium chloride 0 9 % bolus 1,000 mL (0 mL Intravenous Stopped 12/28/18 1554)   diltiazem (CARDIZEM) injection 20 mg (20 mg Intravenous Given 12/28/18 1537)   sodium chloride 0 9 % bolus 1,000 mL (0 mL Intravenous Stopped 12/28/18 1710)   diltiazem (CARDIZEM) tablet 30 mg (30 mg Oral Given 12/29/18 1803)   furosemide (LASIX) injection 20 mg (20 mg Intravenous Given 12/29/18 1217) Diagnostic Studies  Results Reviewed     Procedure Component Value Units Date/Time    T4, free [[id number]]  (Normal) Collected:  12/28/18 1511    Lab Status:  Final result Specimen:  Blood from Arm, Right Updated:  12/28/18 2037     Free T4 0 96 ng/dL     Troponin I [[id number]]  (Normal) Collected:  12/28/18 1511    Lab Status:  Final result Specimen:  Blood from Arm, Right Updated:  12/28/18 1613     Troponin I 0 03 ng/mL     TSH [[id number]]  (Normal) Collected:  12/28/18 1511    Lab Status:  Final result Specimen:  Blood from Arm, Right Updated:  12/28/18 1608     TSH 3RD GENERATON 3 032 uIU/mL     Narrative:         Patients undergoing fluorescein dye angiography may retain small amounts of fluorescein in the body for 48-72 hours post procedure  Samples containing fluorescein can produce falsely depressed TSH values  If the patient had this procedure,a specimen should be resubmitted post fluorescein clearance  Basic metabolic panel [[id number]] Collected:  12/28/18 1511    Lab Status:  Final result Specimen:  Blood from Arm, Right Updated:  12/28/18 1608     Sodium 139 mmol/L      Potassium 4 0 mmol/L      Chloride 104 mmol/L      CO2 25 mmol/L      ANION GAP 10 mmol/L      BUN 22 mg/dL      Creatinine 1 10 mg/dL      Glucose 93 mg/dL      Calcium 8 7 mg/dL      eGFR 73 ml/min/1 73sq m     Narrative:         National Kidney Disease Education Program recommendations are as follows:  GFR calculation is accurate only with a steady state creatinine  Chronic Kidney disease less than 60 ml/min/1 73 sq  meters  Kidney failure less than 15 ml/min/1 73 sq  meters      Magnesium [[id number]]  (Normal) Collected:  12/28/18 1511    Lab Status:  Final result Specimen:  Blood from Arm, Right Updated:  12/28/18 1608     Magnesium 1 8 mg/dL     B-type natriuretic peptide [[id number]]  (Abnormal) Collected:  12/28/18 1511    Lab Status:  Final result Specimen:  Blood from Arm, Right Updated:  12/28/18 1608     NT-proBNP 2,398 (H) pg/mL     CBC and differential [[id number]]  (Abnormal) Collected:  12/28/18 1511    Lab Status:  Final result Specimen:  Blood from Arm, Right Updated:  12/28/18 1543     WBC 11 30 (H) Thousand/uL      RBC 4 54 Million/uL      Hemoglobin 14 9 g/dL      Hematocrit 42 9 %      MCV 95 fL      MCH 32 8 pg      MCHC 34 7 g/dL      RDW 13 3 %      MPV 10 0 fL      Platelets 807 Thousands/uL      nRBC 0 /100 WBCs      Neutrophils Relative 51 %      Immat GRANS % 0 %      Lymphocytes Relative 40 %      Monocytes Relative 8 %      Eosinophils Relative 1 %      Basophils Relative 0 %      Neutrophils Absolute 5 76 Thousands/µL      Immature Grans Absolute 0 05 Thousand/uL      Lymphocytes Absolute 4 47 Thousands/µL      Monocytes Absolute 0 88 Thousand/µL      Eosinophils Absolute 0 10 Thousand/µL      Basophils Absolute 0 04 Thousands/µL                  XR chest portable   ED Interpretation by Medina Orlando MD (12/28 3518)   No acute disease      Final Result by Ny Amos MD (12/28 1858)      No active pulmonary disease  Workstation performed: [id number]               Procedures  Procedures      Phone Consults  ED Phone Contact    ED Course  ED Course as of Dec 30 0015   Fri Dec 28, 2018   [id number] Procedure Note: EKG  Date/Time: 12/28/18 3:59 PM   Indications / Diagnosis: Tachycardia  ECG reviewed by me, the ED Provider: yes   The EKG demonstrates:  Rhythm: SVT  Intervals: normal intervals  Axis: normal axis  QRS/Blocks: normal QRS  ST Changes: No acute ST Changes, no STD/TRISTEN       1600 Repeat EKG after 2x 20mg Cardizem boluses shows aflutter with RVR  No obvious ischemia  Patient remains hemodynamically stable and asymptomatic          [id number] SCORE      Most Recent Value   [id number]   Age     Sex  0 Filed at: 12/28/2018 1843   CHF History  0 Filed at: 12/28/2018 1843   HTN History  1 Filed at: 12/28/2018 1843   Stroke or TIA Symptoms Previously  0 Filed at: 12/28/2018 1843   Vascular Disease History  0 Filed at: 12/28/2018 1843   Diabetes History  0 Filed at: 12/28/2018 1843   [id number] Score  1 Filed at: 12/28/2018 1843                              [facility]  Number of Diagnoses or Management Options  Atrial flutter with rapid ventricular response [facility]):   Diagnosis management comments: Patient was given 2x 20mg IV Cardizem boluses with control of rapid ventricular response  He remained in aflutter/afib with a normal mental status and blood pressure  Cardizem drip at bedside that did not need to be initiated while in the ED  Palpitations resolved with control of heart rate  Cardiac workup remarkable for elevated BNP without other findings  Troponin negative  Patient was admitted to [facility] for further management  CritCare Time    Disposition  Final diagnoses:   Atrial flutter with rapid ventricular response ([address] )     Time reflects when diagnosis was documented in both MDM as applicable and the Disposition within this note     Time User Action Codes Description Comment    12/28/2018  4:41 PM Jamaal Navarro [[id number]] Atrial flutter with rapid ventricular response [facility])       ED Disposition     ED Disposition Condition Comment    Admit  Case was discussed with Dr Gio Melvin and the patient's admission status was agreed to be Admission Status: inpatient status to the service of Dr Gio Melvin          Follow-up Information    None         Current Discharge Medication List      CONTINUE these medications which have NOT CHANGED    Details   atorvastatin (LIPITOR) 10 mg tablet Take 1 tablet (10 mg total) by mouth daily  Qty: 30 tablet, Refills: 11    Associated Diagnoses: Hypercholesterolemia      EPINEPHrine (EPIPEN) 0 3 mg/0 3 mL SOAJ Inject as directed      losartan (COZAAR) 50 mg tablet Take 1 tablet (50 mg total) by mouth daily  Qty: 90 tablet, Refills: 3    Associated Diagnoses: Secondary hypertension      methocarbamol (ROBAXIN) 500 mg tablet Take 1 tablet by mouth           No discharge procedures on file  ED Provider  Attending physically available and evaluated Sunday Harmon  I managed the patient along with the ED Attending      Electronically Signed by         Troy Butler MD  12/30/18 9477

## 2018-12-28 NOTE — PROGRESS NOTES
FAMILY PRACTICE OFFICE VISIT  Zahra Mack Lisha [address]  [address]  [facility]  [place], [place], [facility]      NAME: Sunday Harmon  AGE: 60 y o  SEX: male  : 1960   MRN: [id number]    DATE: 2018  TIME: 2:42 PM    Assessment and Plan     Problem List Items Addressed This Visit        Unprioritized    SVT (supraventricular tachycardia) ([address] ) - Primary      Other Visit Diagnoses     Nonintractable headache, unspecified chronicity pattern, unspecified headache type        Bilateral low back pain with bilateral sciatica, unspecified chronicity              Patient Instructions   We reviewed his emergency room visit from , his headache/back pain is essentially resolved but EKG redone here today shows SVT with rate 170  I would like him back at the emergency room, needs observation/further evaluation  Should have redo TSH, last was done 2016  Can have Cardiology consultation, further workup there  Stable to go by car, his partner will drive him  Future redo UA  ( 2-4 RBC at ER was obtained via st cath )      Chief Complaint     Chief Complaint   Patient presents with    Follow-up     ER f/up to marie pain and headaches        History of Present Illness   Sunday Harmon is a 60y o -year-old male who was in his usual health until recent onset pounding headache associated with neck and back pain, presented to emergency room  as pain level was a 10/10  Had CT head, chest, abdomen, pelvis  Received migraine cocktail along with IV fluids, improved  Has continued to improve at home, currently pain level a 1 on 0 to 10 scale  Was out walking yesterday, felt good  Other testing at emergency room showed 2 to 4 RBC on urine, 2 mm stone left kidney %period% creatinine 1 31    EKG with tachycardia, occasional PVC    Last night in bad 'felt hyped on caffeine' ( uses max 2 cups coffee a day-   None today" )  ( etoh= 2 glasses wine a day)   Had some dyspnea      Review of Systems   Review of Systems   Constitutional: Positive for fever (previously-   none past few days) and unexpected weight change (gained 10 lbs vs May)  Negative for appetite change and chills  HENT: Negative for congestion and sore throat  Eyes: Negative for visual disturbance  Respiratory: Negative for cough, chest tightness and wheezing  See HPI-  No current SOB   Cardiovascular: Positive for palpitations  Negative for chest pain and leg swelling  Gastrointestinal: Negative for abdominal pain, blood in stool, nausea and vomiting  No change BM   Genitourinary: Negative for dysuria and hematuria  Musculoskeletal: Positive for back pain  Skin: Negative for rash  Neurological: Positive for headaches (see HPI)  Negative for dizziness, syncope, facial asymmetry, speech difficulty, weakness, light-headedness and numbness  Psychiatric/Behavioral: Negative for behavioral problems and confusion         Active Problem List     Patient Active Problem List   Diagnosis    Allergic rhinitis    Hypercholesterolemia    Essential hypertension    Migraine headache    Nasal polyp    BPH associated with nocturia    Kidney stone    Pulmonary nodule    SVT (supraventricular tachycardia) (HCC)       Past Medical History:  Past Medical History:   Diagnosis Date    Hyperlipidemia     Hypertension        Past Surgical History:  Past Surgical History:   Procedure Laterality Date    CHOLECYSTECTOMY  1991    COLONOSCOPY      x2 - last was 2008 (neg)    NOSE SURGERY      See ENT note 2016       Family History:  Family History   Problem Relation Age of Onset    Other Mother         Sarcoma    Hypertension Father        Social History:  Social History     Social History    Marital status: Single     Spouse name: N/A    Number of children: N/A    Years of education: N/A     Occupational History    [employer], [employer] Social History Main Topics    Smoking status: Former Smoker     Quit date: 12/2014    Smokeless tobacco: Never Used    Alcohol use 4 2 oz/week     7 Glasses of wine per week      Comment: Social drinker    Drug use: No    Sexual activity: Not on file     Other Topics Concern    Not on file     Social History Narrative    Recreational activities:  Walking       Objective     Vitals:    12/28/18 1351   BP: 98/70   BP Location: Left arm   Patient Position: Sitting   Cuff Size: Large   Pulse: (!) 152   SpO2: 97%   Weight: 94 9 kg (209 lb 3 2 oz)   Height: 6' 1" (1 854 m)     Body mass index is 27 6 kg/m²  BP Readings from Last 3 Encounters:   12/28/18 98/70   12/26/18 162/82   05/04/18 134/86       Wt Readings from Last 3 Encounters:   12/28/18 94 9 kg (209 lb 3 2 oz)   12/25/18 97 1 kg (214 lb)   05/04/18 90 4 kg (199 lb 6 4 oz)       Physical Exam   Constitutional: He is oriented to person, place, and time  He appears well-developed and well-nourished  No distress  Eyes: Conjunctivae are normal  No scleral icterus  Chronic exophth   Neck: Neck supple  Cardiovascular:   Reg / tachycardic at rates 120-170   Pulmonary/Chest: Effort normal and breath sounds normal  No respiratory distress  He has no wheezes  He has no rales  Abdominal: Soft  There is no tenderness  Musculoskeletal: He exhibits no edema  Lymphadenopathy:     He has no cervical adenopathy  Neurological: He is alert and oriented to person, place, and time  No cranial nerve deficit  Psychiatric: He has a normal mood and affect   His behavior is normal  Thought content normal        ALLERGIES:  Allergies   Allergen Reactions    Dust Mite Extract     Lisinopril Edema     Annotation - [id number]: lip swelling x 2 2015    Short Ragweed Pollen Ext        Current Medications     Current Outpatient Prescriptions   Medication Sig Dispense Refill    atorvastatin (LIPITOR) 10 mg tablet Take 1 tablet (10 mg total) by mouth daily 30 tablet 11    EPINEPHrine (EPIPEN) 0 3 mg/0 3 mL SOAJ Inject as directed      losartan (COZAAR) 50 mg tablet Take 1 tablet (50 mg total) by mouth daily 90 tablet 3    methocarbamol (ROBAXIN) 500 mg tablet Take 1 tablet by mouth       No current facility-administered medications for this visit  Most recent labs available from [address] [place] [address]   ( others may be available in [facility] / Media sections)  Lab Results   Component Value Date    WBC 9 37 12/25/2018    HGB 13 8 12/25/2018    HCT 40 4 12/25/2018     12/25/2018    CHOL 154 06/18/2015    TRIG 56 05/07/2018    HDL 56 05/07/2018    ALT 47 12/25/2018    AST 30 12/25/2018     06/18/2015    K 4 1 12/25/2018     12/25/2018    CREATININE 1 31 (H) 12/25/2018    BUN 16 12/25/2018    CO2 28 12/25/2018    PSA 0 8 05/07/2018    GLUF 99 05/07/2018    HGBA1C 5 3 05/07/2018     Lab Results   Component Value Date    LDLCALC 63 05/07/2018     Lab Results   Component Value Date    PSB9VZMBJMTR 2 390 04/20/2016         No orders of the defined types were placed in this encounter          Sabrina Suarez DO

## 2018-12-28 NOTE — ED NOTES
Called for 10 min   Was told they would call back in a few minutes     Rigoberto Martin RN  12/28/18 9198

## 2018-12-29 LAB
ANION GAP SERPL CALCULATED.3IONS-SCNC: 8 MMOL/L (ref 4–13)
ATRIAL RATE: 169 BPM
ATRIAL RATE: 357 BPM
ATRIAL RATE: 84 BPM
BASOPHILS # BLD AUTO: 0.04 THOUSANDS/ΜL (ref 0–0.1)
BASOPHILS NFR BLD AUTO: 1 % (ref 0–1)
BUN SERPL-MCNC: 18 MG/DL (ref 5–25)
CALCIUM SERPL-MCNC: 8.2 MG/DL (ref 8.3–10.1)
CHLORIDE SERPL-SCNC: 106 MMOL/L (ref 100–108)
CHOLEST SERPL-MCNC: 143 MG/DL (ref 50–200)
CO2 SERPL-SCNC: 25 MMOL/L (ref 21–32)
CREAT SERPL-MCNC: 0.99 MG/DL (ref 0.6–1.3)
EOSINOPHIL # BLD AUTO: 0.16 THOUSAND/ΜL (ref 0–0.61)
EOSINOPHIL NFR BLD AUTO: 2 % (ref 0–6)
ERYTHROCYTE [DISTWIDTH] IN BLOOD BY AUTOMATED COUNT: 13.4 % (ref 11.6–15.1)
GFR SERPL CREATININE-BSD FRML MDRD: 83 ML/MIN/1.73SQ M
GLUCOSE SERPL-MCNC: 91 MG/DL (ref 65–140)
HCT VFR BLD AUTO: 38.7 % (ref 36.5–49.3)
HDLC SERPL-MCNC: 49 MG/DL (ref 40–60)
HGB BLD-MCNC: 13.1 G/DL (ref 12–17)
IMM GRANULOCYTES # BLD AUTO: 0.02 THOUSAND/UL (ref 0–0.2)
IMM GRANULOCYTES NFR BLD AUTO: 0 % (ref 0–2)
LDLC SERPL CALC-MCNC: 75 MG/DL (ref 0–100)
LYMPHOCYTES # BLD AUTO: 3.33 THOUSANDS/ΜL (ref 0.6–4.47)
LYMPHOCYTES NFR BLD AUTO: 39 % (ref 14–44)
MAGNESIUM SERPL-MCNC: 2 MG/DL (ref 1.6–2.6)
MCH RBC QN AUTO: 32.9 PG (ref 26.8–34.3)
MCHC RBC AUTO-ENTMCNC: 33.9 G/DL (ref 31.4–37.4)
MCV RBC AUTO: 97 FL (ref 82–98)
MONOCYTES # BLD AUTO: 0.52 THOUSAND/ΜL (ref 0.17–1.22)
MONOCYTES NFR BLD AUTO: 6 % (ref 4–12)
NEUTROPHILS # BLD AUTO: 4.49 THOUSANDS/ΜL (ref 1.85–7.62)
NEUTS SEG NFR BLD AUTO: 52 % (ref 43–75)
NONHDLC SERPL-MCNC: 94 MG/DL
NRBC BLD AUTO-RTO: 0 /100 WBCS
P AXIS: 252 DEGREES
P AXIS: 259 DEGREES
P AXIS: 64 DEGREES
PLATELET # BLD AUTO: 202 THOUSANDS/UL (ref 149–390)
PMV BLD AUTO: 10 FL (ref 8.9–12.7)
POTASSIUM SERPL-SCNC: 4 MMOL/L (ref 3.5–5.3)
PR INTERVAL: 124 MS
PR INTERVAL: 150 MS
QRS AXIS: -5 DEGREES
QRS AXIS: 2 DEGREES
QRS AXIS: 24 DEGREES
QRSD INTERVAL: 108 MS
QRSD INTERVAL: 164 MS
QRSD INTERVAL: 86 MS
QT INTERVAL: 270 MS
QT INTERVAL: 312 MS
QT INTERVAL: 362 MS
QTC INTERVAL: 427 MS
QTC INTERVAL: 437 MS
QTC INTERVAL: 452 MS
RBC # BLD AUTO: 3.98 MILLION/UL (ref 3.88–5.62)
SODIUM SERPL-SCNC: 139 MMOL/L (ref 136–145)
T WAVE AXIS: -4 DEGREES
T WAVE AXIS: -83 DEGREES
T WAVE AXIS: 20 DEGREES
TRIGL SERPL-MCNC: 95 MG/DL
TSH SERPL DL<=0.05 MIU/L-ACNC: 3.23 UIU/ML (ref 0.36–3.74)
VENTRICULAR RATE: 118 BPM
VENTRICULAR RATE: 169 BPM
VENTRICULAR RATE: 84 BPM
WBC # BLD AUTO: 8.56 THOUSAND/UL (ref 4.31–10.16)

## 2018-12-29 PROCEDURE — 84443 ASSAY THYROID STIM HORMONE: CPT | Performed by: FAMILY MEDICINE

## 2018-12-29 PROCEDURE — 80048 BASIC METABOLIC PNL TOTAL CA: CPT | Performed by: FAMILY MEDICINE

## 2018-12-29 PROCEDURE — 85025 COMPLETE CBC W/AUTO DIFF WBC: CPT | Performed by: FAMILY MEDICINE

## 2018-12-29 PROCEDURE — 93010 ELECTROCARDIOGRAM REPORT: CPT | Performed by: INTERNAL MEDICINE

## 2018-12-29 PROCEDURE — 99232 SBSQ HOSP IP/OBS MODERATE 35: CPT | Performed by: NURSE PRACTITIONER

## 2018-12-29 PROCEDURE — 83735 ASSAY OF MAGNESIUM: CPT | Performed by: FAMILY MEDICINE

## 2018-12-29 PROCEDURE — 99254 IP/OBS CNSLTJ NEW/EST MOD 60: CPT | Performed by: INTERNAL MEDICINE

## 2018-12-29 PROCEDURE — 80061 LIPID PANEL: CPT | Performed by: FAMILY MEDICINE

## 2018-12-29 RX ORDER — LOSARTAN POTASSIUM 25 MG/1
25 TABLET ORAL DAILY
Status: DISCONTINUED | OUTPATIENT
Start: 2018-12-30 | End: 2018-12-30 | Stop reason: HOSPADM

## 2018-12-29 RX ORDER — FUROSEMIDE 10 MG/ML
20 INJECTION INTRAMUSCULAR; INTRAVENOUS ONCE
Status: COMPLETED | OUTPATIENT
Start: 2018-12-29 | End: 2018-12-29

## 2018-12-29 RX ORDER — DILTIAZEM HYDROCHLORIDE 120 MG/1
120 CAPSULE, COATED, EXTENDED RELEASE ORAL DAILY
Status: DISCONTINUED | OUTPATIENT
Start: 2018-12-30 | End: 2018-12-30 | Stop reason: HOSPADM

## 2018-12-29 RX ADMIN — APIXABAN 5 MG: 5 TABLET, FILM COATED ORAL at 12:17

## 2018-12-29 RX ADMIN — LOSARTAN POTASSIUM 50 MG: 50 TABLET, FILM COATED ORAL at 09:59

## 2018-12-29 RX ADMIN — DILTIAZEM HYDROCHLORIDE 30 MG: 30 TABLET, FILM COATED ORAL at 05:25

## 2018-12-29 RX ADMIN — APIXABAN 5 MG: 5 TABLET, FILM COATED ORAL at 18:03

## 2018-12-29 RX ADMIN — ATORVASTATIN CALCIUM 10 MG: 10 TABLET, FILM COATED ORAL at 18:03

## 2018-12-29 RX ADMIN — DILTIAZEM HYDROCHLORIDE 30 MG: 30 TABLET, FILM COATED ORAL at 12:17

## 2018-12-29 RX ADMIN — DILTIAZEM HYDROCHLORIDE 30 MG: 30 TABLET, FILM COATED ORAL at 18:03

## 2018-12-29 RX ADMIN — ENOXAPARIN SODIUM 40 MG: 40 INJECTION SUBCUTANEOUS at 09:59

## 2018-12-29 RX ADMIN — FUROSEMIDE 20 MG: 10 INJECTION, SOLUTION INTRAMUSCULAR; INTRAVENOUS at 12:17

## 2018-12-29 NOTE — ASSESSMENT & PLAN NOTE
· Was noted in the emergency department, adenosine was given  · Likely atrial fibrillation  · On telemetry monitoring

## 2018-12-29 NOTE — ASSESSMENT & PLAN NOTE
· As was witnessed in ED  · Patient was noted for a SVT at PCPs office with heart rate and 170  · Increased alcohol use reported, 2-3 drinks daily in the past couple of days versus normally 1 glass of wine daily  Also had low grade fever and possible dehydration  · SVT in the ED and received adenosine and with slower rate, atrial flutter was revealed  · Palpitations and mild shortness of breath during episode  · Heart rate controlled at 80 - patient was given IV Cardizem bolus but never started on Cardizem drip  · TSH normal  · Admitted to telemetry  · Cardizem 30 mg every 6 hours p o  initiated  · [id number] is 1  · Atrial flutter and fibrillation noted  Converted to NSR around 0300    · Cardiology consult appreciated  · ECHO pending

## 2018-12-29 NOTE — ASSESSMENT & PLAN NOTE
· Secondary to new onset atrial flutter - patient reported associated shortness of breath  · Elevated proBNP  · Patient appears euvolemic on examined currently asymptomatic, will not be initiating diuretics  · Echo pending  · Cardiology consult performed

## 2018-12-29 NOTE — ASSESSMENT & PLAN NOTE
As was witnessed in ED  Patient was noted for a SVT at PCPs office with heart rate and 170, was in SVT in the ED, he received adenosine and later developed atrial flutter  The patient does state that he felt palpitations and mild shortness of breath during this episode  Patient's heart rate is now controlled heart rate of 80 - patient was given IV Cardizem bolus but never started on Cardizem drip  - patient admitted to [facility] with telemetry  - will start on Cardizem 30 mg every 6 hours p o   - [id number] is 1 in anticoagulation is not indicated  - TSH within normal limits  - patient does report increased alcohol use, 2-3 drinks daily in the past couple of days versus normally 1 glass of wine daily  - CBC, BMP, magnesium and lipid profile in the morning

## 2018-12-29 NOTE — CONSULTS
Cardiology Consult  12/29/18    Referring Physian: MD KIMBERLY Pérez    Chief Complain/Reason for Referal:     IMPRESSION/RECOMMENDATIONS/DISCUSSION:  1  Atrial flutter with 2-1 av block, new (presenting) diagnosis  2  Atrial fibrillation with rapid ventricular response noted on telemetry--subsequent conversion to sinus rhythm  3  Benign essential hypertension  4  Mild leukocytosis likely secondary to above  5  Elevated NT proBNP with symptoms of orthopnea and paroxysmal nocturnal dyspnea    · Patient remaining in sinus rhythm  Transition diltiazem to long-acting starting tomorrow morning  May need to lower losartan or discontinue altogether if marginally low blood pressures  · Chads Vasc score is 1, and in accordance with most recent ACP guidelines, will anticoagulate with DOAC  · Echocardiogram, particularly to rule out cardiomyopathy, as NT proBNP is elevated with 1 episode of orthopnea and paroxysmal nocturnal dyspnea 2 nights ago  Will give one dose IV lasix  No ongoing evidence of volume overload, no ongoing shortness of breath   · Will schedule outpatient electrophysiology evaluation for consideration of atrial flutter ablation and potentially starting antiarrhythmic treatment for paroxysmal atrial fibrillation    ======================================================    HPI:  I am seeing this patient in cardiology consultation for:  Atrial flutter    Rick Obando is a 60 y o  male with no prior cardiac history  He has a history of hypertension for which she is on losartan  He has had mild angioedema on lisinopril in the past   He went to the ER on 12/25/2018 secondary to a headache, neck pain, and back pain  The symptoms improved with and on G6  ECG at that time revealed sinus rhythm  He was discharged, and 2 nights ago, had symptoms of palpitations while in bed along with orthopnea and 1 episode of paroxysmal nocturnal dyspnea    Yesterday, he went to see his PCP for his back pain and was noted to have a narrow complex tachycardia in the 170s  This was presumed to be SVT and he was sent to the emergency department  He was given adenosine which uncovered flutter waves  He was initiated on intravenous diltiazem  Telemetry while on the floor revealed atrial fibrillation and he later converted to sinus rhythm on diltiazem  Diltiazem 30 mg every 6 hr has been initiated along with continuation of losartan  Denies any prior cardiac history  He drinks at least 2 glasses of wine daily and on occasion will drink heavy amounts of alcohol  He does admit to increased alcohol use over this past week during the holidays  He denies illicit drug use, does not smoke  His father had rheumatic heart disease and  in his 45s  He states he had a stress test in the remote past which was unremarkable  Past Medical History:   Diagnosis Date    Hyperlipidemia     Hypertension          Scheduled Meds:  Current Facility-Administered Medications:  acetaminophen 650 mg Oral Q6H PRN Rae Hastings MD    atorvastatin 10 mg Oral Daily With Noe Dunne MD    diltiazem 1-15 mg/hr Intravenous Once Jessica Ramirez MD Last Rate: Stopped (18 [id number])   diltiazem 30 mg Oral Q6H [facility] Sofía Keller MD    enoxaparin 40 mg Subcutaneous Q24H [facility] Sofía Keller MD    losartan 50 mg Oral Daily Sofía Keller MD    methocarbamol 500 mg Oral Q6H PRN Rae Hastings MD    ondansetron 4 mg Intravenous Q6H PRN Rae Hastings MD      Continuous Infusions:   PRN Meds:   acetaminophen    methocarbamol    ondansetron  Allergies   Allergen Reactions    Dust Mite Extract     Lisinopril Edema     Annotation - [id number]: lip swelling x 2 2015    Short Ragweed Pollen Ext      I reviewed the Home Medication list in the chart       Family History   Problem Relation Age of Onset    Other Mother         Sarcoma    Hypertension Father        Social History     Social History    Marital status: Single     Spouse name: N/A    Number of children: N/A    Years of education: N/A     Occupational History    [employer], 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     Social History Main Topics    Smoking status: Former Smoker     Quit date: 12/2014    Smokeless tobacco: Never Used    Alcohol use 4 2 oz/week     7 Glasses of wine per week      Comment: Social drinker    Drug use: No    Sexual activity: Not on file     Other Topics Concern    Not on file     Social History Narrative    Recreational activities:  Walking       Review of Systems - as per HPI, all others reviewed and negative  Vitals:    12/29/18 0700   BP: 134/79   Pulse: 76   Resp: 18   Temp: (!) 97 2 °F (36 2 °C)   SpO2: 98%     I/O       12/27 0701 - 12/28 0700 12/28 0701 - 12/29 0700 12/29 0701 - 12/30 0700    IV Piggyback  2000     Total Intake(mL/kg)  2000 (21 3)     Urine (mL/kg/hr)  700 400 (1 2)    Total Output   700 400    Net   +1300 -400               Weight (last 2 days)     Date/Time   Weight    12/29/18 0557  94 1 (207 45)    12/28/18 1847  94 1 (207 45)    12/28/18 1742  94 1 (207 45)              GEN: NAD, Alert  HEENT: Mucus membranes moist, pink conjunctivae  EYES: Pupils equal, sclera anicteric  NECK: No JVD/HJR, no carotid bruit  CARDIOVASCULAR: RRR, No murmur, rub, gallops S1,S2, no parasternal heave/thrill  LUNGS: Clear To auscultation bilaterally  ABDOMEN: Soft, nondistended, no hepatic systolic pulsation  EXTREMITIES/VASCULAR: No edema    PSYCH: Normal Affect by limited examination  NEURO: Grossly intact by limited examination    HEME: No significant bleeding, bruising, petechia by limited examination  SKIN: No significant rashes by limited examination  MSK:  Normal upper extremity and trunk strengths by limited examination      EKG:  Atrial flutter with 2-1 av block  TELE:  Atrial fibrillation with rapid ventricular response  CXR:  No acute disease          Results from last 7 days  Lab Units 12/29/18  0517 12/28/18  1511 12/25/18  2328   WBC Thousand/uL 8 56 11 30* 9 37   HEMOGLOBIN g/dL 13 1 14 9 13 8   HEMATOCRIT % 38 7 42 9 40 4   PLATELETS Thousands/uL 202 260 220   NEUTROS PCT % 52 51 63   MONOS PCT % 6 8 6       Results from last 7 days  Lab Units 12/29/18  0517 12/28/18  1511 12/25/18  2328   POTASSIUM mmol/L 4 0 4 0 4 1   CHLORIDE mmol/L 106 104 105   CO2 mmol/L 25 25 28   BUN mg/dL 18 22 16   CREATININE mg/dL 0 99 1 10 1 31*   CALCIUM mg/dL 8 2* 8 7 8 4       Results from last 7 days  Lab Units 12/29/18  0517 12/28/18  1511 12/25/18  2328   POTASSIUM mmol/L 4 0 4 0 4 1   CHLORIDE mmol/L 106 104 105   CO2 mmol/L 25 25 28   BUN mg/dL 18 22 16   CREATININE mg/dL 0 99 1 10 1 31*   CALCIUM mg/dL 8 2* 8 7 8 4   ALK PHOS U/L  --   --  78   ALT U/L  --   --  47   AST U/L  --   --  30     No results found for: TROPONINT        Results from last 7 days  Lab Units 12/28/18  1511   TROPONIN I ng/mL 0 03               Results from last 7 days  Lab Units 12/28/18  1511   FREE T4 ng/dL 0 96           I have personally reviewed the EKG, CXR and Telemetry images directly  Patient Active Problem List    Diagnosis Date Noted    SVT (supraventricular tachycardia) ([facility] ) 12/28/2018     Priority: Low    Atrial flutter ([facility] ) 12/28/2018     Priority: Low    Acute diastolic (congestive) heart failure ([facility] ) 12/28/2018     Priority: Low    Kidney stone 12/26/2018     Priority: Low    Pulmonary nodule 12/26/2018     Priority: Low    BPH associated with nocturia 05/04/2018     Priority: Low    Allergic rhinitis 10/20/2016     Priority: Low    Nasal polyp 10/20/2016     Priority: Low    Migraine headache 01/24/2014     Priority: Low    Hypercholesterolemia 07/18/2013     Priority: Low    Essential hypertension 07/16/2013     Priority: Low       Portions of the record may have been created with voice recognition software  Occasional wrong word or "sound a like" substitutions may have occurred due to the inherent limitations of voice recognition software   Read the chart carefully and recognize, using context, where substitutions have occurred

## 2018-12-29 NOTE — ASSESSMENT & PLAN NOTE
Secondary to new onset atrial flutter - patient reported associated shortness of breath  Elevated proBNP  Patient appears euvolemic on examined currently asymptomatic, will not be initiating diuretics  Will need to obtain an echocardiogram  Cardiology consult  Monitor daily weights

## 2018-12-29 NOTE — UTILIZATION REVIEW
[address]  Utilization Review Department  Phone: [phone]; Fax [phone]  [email or web address]  ATTENTION: Please call with any questions or concerns to [phone]  and carefully listen to the prompts so that you are directed to the right person  Send all requests for admission clinical reviews, approved or denied determinations and any other requests to fax [phone]  All voicemails are confidential     Initial Clinical Review    Admission: Date/Time/Statement: INPATIENT 12/28/18 @ 1641     Orders Placed This Encounter   Procedures    Inpatient Admission (expected length of stay for this patient is greater than two midnights)     Standing Status:   Standing     Number of Occurrences:   1     Order Specific Question:   Admitting Physician     Answer:   Mckinley Rodriguez [[id number]]     Order Specific Question:   Level of Care     Answer:   Med Surg [16]     Order Specific Question:   Estimated length of stay     Answer:   More than 2 Midnights     Order Specific Question:   Certification     Answer:   I certify that inpatient services are medically necessary for this patient for a duration of greater than two midnights  See H&P and MD Progress Notes for additional information about the patient's course of treatment  ED: Date/Time/Mode of Arrival:   ED Arrival Information     Expected Arrival Acuity Means of Arrival Escorted By Service Admission Type    - 12/28/2018 14:56 Emergent Walk-In Self General Medicine Emergency    Arrival Complaint    Abnormal Labs/SVT         Chief Complaint   Patient presents with    Rapid Heart Rate     Patient arrives from PCP referred to ED for SVT  Rate in the 170's on EKG performed at office  Patient reports feeling palpitations  Denies shortness of breath, dizziness, chest pain  History of Illness: 61 y/o male with hx of HTN, HLD presents to ED from PCP office with rapid heart rate in 170s   Pt found to be in SVT and given adenosine in ED  Pt subsequently developed Aflutter of which he was symptomatic with SOB and palpitations  Cardizem bolus given  ED Triage Vitals [12/28/18 1501]   Temperature Pulse Respirations Blood Pressure SpO2   98 2 °F (36 8 °C) (!) 170 20 113/69 98 %      Temp Source Heart Rate Source Patient Position - Orthostatic VS BP Location FiO2 (%)   Temporal Monitor Sitting Right arm --      Pain Score       No Pain        Wt Readings from Last 1 Encounters:   12/29/18 94 1 kg (207 lb 7 3 oz)       Vital Signs (abnormal): , 169    Abnormal Labs/Diagnostic Test Results:   Ca 8 7, 8 2  BNP 2,398  Trop 0 03  WBC 11 30, 8 56    EKG:  Atrial flutter with 2 to 1 block  Right bundle branch block  Nonspecific T wave abnormality  When compared with ECG of 25-DEC-2018 23:40, Atrial flutter has replaced Sinus rhythm  Right bundle branch block is now Present  CXR:  No acute    ED Treatment:   Medication Administration from 12/28/2018 1455 to 12/28/2018 1735       Date/Time Order Dose Route Action     12/28/2018 1514 adenosine (ADENOCARD) 6 mg/2 mL injection **ADS Override Pull** 6 mg  Given     12/28/2018 1517 diltiazem (CARDIZEM) injection 20 mg 20 mg Intravenous Given     12/28/2018 1512 sodium chloride 0 9 % bolus 1,000 mL 1,000 mL Intravenous New Bag     12/28/2018 1537 diltiazem (CARDIZEM) injection 20 mg 20 mg Intravenous Given     12/28/2018 1555 sodium chloride 0 9 % bolus 1,000 mL 1,000 mL Intravenous New Bag        Past Medical/Surgical History:    Active Ambulatory Problems     Diagnosis Date Noted    Allergic rhinitis 10/20/2016    Hypercholesterolemia 07/18/2013    Essential hypertension 07/16/2013    Migraine headache 01/24/2014    Nasal polyp 10/20/2016    BPH associated with nocturia 05/04/2018    Kidney stone 12/26/2018    Pulmonary nodule 12/26/2018    SVT (supraventricular tachycardia) ([facility] ) 12/28/2018     Past Medical History:   Diagnosis Date    Hyperlipidemia     Hypertension Admitting Diagnosis: Rapid heart rate [R00 0]  Atrial flutter with rapid ventricular response (HCC) [I48 92]    Age/Sex: 60 y o  male    Assessment/Plan: 59 y/o male to ED from PCP office  Admitted due to Aflutter, acute diastolic CHF, SVT  Start cardizem Q6h  Repeat am labs  Echo  Cardiology consult  Admission Orders:  Daily weight  Tele  I/O  OOB as delaney  Cardiac diet  Cardiology consult  Echo  Labs    Scheduled Meds:   Current Facility-Administered Medications:  acetaminophen 650 mg Oral Q6H PRN   apixaban 5 mg Oral BID   atorvastatin 10 mg Oral Daily With Dinner   [START ON 12/30/2018] diltiazem 120 mg Oral Daily   diltiazem 1-15 mg/hr Intravenous Once   diltiazem 30 mg Oral Q6H [address]   [START ON 12/30/2018] losartan 25 mg Oral Daily   methocarbamol 500 mg Oral Q6H PRN   ondansetron 4 mg Intravenous Q6H PRN     12/29 Cardiology consult:  New diagnosis Aflutter with 2-1 AV block  Elevated BNP with symptoms of orthopnea and paroxysmal nocturnal dyspnea  Transition to long acting diltiazem  Monitor BP to determine dosing/need for Losartan  IV Lasix   Electrophysiology eval

## 2018-12-29 NOTE — ASSESSMENT & PLAN NOTE
Was noted in the emergency department, adenosine was given and patient then developed atrial flutter  Telemetry monitoring

## 2018-12-29 NOTE — PROGRESS NOTES
[address] Internal Medicine  Progress Note - Terry Nassar 1960, 60 y o  male MRN: [id number]    Unit/Bed#: E4 -01 Encounter: [id number]    Primary Care Provider: eRnate Marie DO   Date and time admitted to hospital: 12/28/2018  3:02 PM        SVT (supraventricular tachycardia) ([address] )   Assessment & Plan    · Was noted in the emergency department, adenosine was given  · Likely atrial fibrillation  · On telemetry monitoring     * Atrial flutter ([address] )   Assessment & Plan    · As was witnessed in ED  · Patient was noted for a SVT at PCPs office with heart rate and 170  · Increased alcohol use reported, 2-3 drinks daily in the past couple of days versus normally 1 glass of wine daily  Also had low grade fever and possible dehydration  · SVT in the ED and received adenosine and with slower rate, atrial flutter was revealed  · Palpitations and mild shortness of breath during episode  · Heart rate controlled at 80 - patient was given IV Cardizem bolus but never started on Cardizem drip  · TSH normal  · Admitted to telemetry  · Cardizem 30 mg every 6 hours p o  initiated  · [id number] is 1  · Atrial flutter and fibrillation noted  Converted to NSR around 0300    · Cardiology consult appreciated  · ECHO pending       Acute diastolic (congestive) heart failure ([address] )   Assessment & Plan    · Secondary to new onset atrial flutter - patient reported associated shortness of breath  · Elevated proBNP  · Patient appears euvolemic on examined currently asymptomatic, will not be initiating diuretics  · Echo pending  · Cardiology consult performed     Essential hypertension   Assessment & Plan    · On losartan at home, will continue  · Monitor blood pressure and trend  · Cardizem po ordered       Hypercholesterolemia   Assessment & Plan    · Continue atorvastatin 10 mg daily  · Lipid profile normal         VTE Pharmacologic Prophylaxis:   Pharmacologic: Apixaban (Eliquis)  Mechanical VTE Prophylaxis in Place: No    Patient Centered Rounds: I have performed bedside rounds with nursing staff today  Discussions with Specialists or Other Care Team Provider: Cardiology    Education and Discussions with Family / Patient: Plan of care with patient    Time Spent for Care: 30 minutes  More than 50% of total time spent on counseling and coordination of care as described above  Current Length of Stay: 1 day(s)    Current Patient Status: Inpatient   Certification Statement: The patient will continue to require additional inpatient hospital stay due to Further cardiac evaluation    Discharge Plan: Pending outcome of cardiac testing    Code Status: Level 1 - Full Code      Subjective:   Patient feels well  He denies any current chest pain, shortness of breath, palpitations or dizziness  Objective:     Vitals:   Temp (24hrs), Av 1 °F (36 7 °C), Min:97 2 °F (36 2 °C), Max:99 °F (37 2 °C)    Temp:  [97 2 °F (36 2 °C)-99 °F (37 2 °C)] 97 2 °F (36 2 °C)  HR:  [] 76  Resp:  [18-20] 18  BP: ()/(67-85) 134/79  SpO2:  [95 %-98 %] 98 %  Body mass index is 27 37 kg/m²  Input and Output Summary (last 24 hours): Intake/Output Summary (Last 24 hours) at 18 1041  Last data filed at 18 0901   Gross per 24 hour   Intake             2000 ml   Output             1100 ml   Net              900 ml       Physical Exam:     Physical Exam   Constitutional: He is oriented to person, place, and time  He appears well-developed and well-nourished  No distress  HENT:   Head: Normocephalic and atraumatic  Eyes: Conjunctivae are normal  No scleral icterus  Cardiovascular: Normal rate, regular rhythm and normal heart sounds  No murmur heard  Pulmonary/Chest: Effort normal and breath sounds normal  No respiratory distress  He has no wheezes  He has no rales  Abdominal: Soft  Bowel sounds are normal  He exhibits no distension  There is no tenderness  Musculoskeletal: Normal range of motion   He exhibits no edema or tenderness  Neurological: He is alert and oriented to person, place, and time  Skin: Skin is warm and dry  He is not diaphoretic  Psychiatric: He has a normal mood and affect  His behavior is normal    Nursing note and vitals reviewed  Additional Data:     Labs:      Results from last 7 days  Lab Units 12/29/18  0517   WBC Thousand/uL 8 56   HEMOGLOBIN g/dL 13 1   HEMATOCRIT % 38 7   PLATELETS Thousands/uL 202   NEUTROS PCT % 52   LYMPHS PCT % 39   MONOS PCT % 6   EOS PCT % 2       Results from last 7 days  Lab Units 12/29/18  0517  12/25/18  2328   SODIUM mmol/L 139  < > 140   POTASSIUM mmol/L 4 0  < > 4 1   CHLORIDE mmol/L 106  < > 105   CO2 mmol/L 25  < > 28   BUN mg/dL 18  < > 16   CREATININE mg/dL 0 99  < > 1 31*   ANION GAP mmol/L 8  < > 7   CALCIUM mg/dL 8 2*  < > 8 4   ALBUMIN g/dL  --   --  3 5   TOTAL BILIRUBIN mg/dL  --   --  0 26   ALK PHOS U/L  --   --  78   ALT U/L  --   --  47   AST U/L  --   --  30   GLUCOSE RANDOM mg/dL 91  < > 128   < > = values in this interval not displayed  Results from last 7 days  Lab Units 12/25/18  2328   LACTIC ACID mmol/L 1 4           * I Have Reviewed All Lab Data Listed Above  * Additional Pertinent Lab Tests Reviewed: [address] Admission Reviewed    Imaging:    Imaging Reports Reviewed Today Include: CXR 12/28  Imaging Personally Reviewed by Myself Includes:  None    Recent Cultures (last 7 days):       Results from last 7 days  Lab Units 12/25/18  2338 12/25/18  2328   BLOOD CULTURE  No Growth at 48 hrs  No Growth at 48 hrs     INFLUENZA B PCR   --  None Detected   RSV PCR   --  None Detected       Last 24 Hours Medication List:     Current Facility-Administered Medications:  acetaminophen 650 mg Oral Q6H PRN Arvin Armijo MD    apixaban 5 mg Oral BID Rumariana Cardenas, DO    atorvastatin 10 mg Oral Daily With MD Binta Tran ON 12/30/2018] diltiazem 120 mg Oral Daily Rumariana Cardenas,     diltiazem 1-15 mg/hr Intravenous Once Jocelyne Mackey MD Last Rate: Stopped (12/28/18 1225)   diltiazem 30 mg Oral Q6H [facility] Tulio Cardenas,     furosemide 20 mg Intravenous Once Tulio Cardenas DO    [START ON 12/30/2018] losartan 25 mg Oral Daily Tulio Cardenas DO    methocarbamol 500 mg Oral Q6H PRN Nerissa Wong MD    ondansetron 4 mg Intravenous Q6H PRN Nerissa Wong MD         Today, Patient Was Seen By: STEVE Fraser    ** Please Note: Dictation voice to text software may have been used in the creation of this document   **

## 2018-12-29 NOTE — PLAN OF CARE
Problem: Potential for Falls  Goal: Patient will remain free of falls  INTERVENTIONS:  - Assess patient frequently for physical needs  -  Identify cognitive and physical deficits and behaviors that affect risk of falls    -  [place] fall precautions as indicated by assessment   - Educate patient/family on patient safety including physical limitations  - Instruct patient to call for assistance with activity based on assessment  - Modify environment to reduce risk of injury  - Consider OT/PT consult to assist with strengthening/mobility   Outcome: Progressing      Problem: PAIN - ADULT  Goal: Verbalizes/displays adequate comfort level or baseline comfort level  Interventions:  - Encourage patient to monitor pain and request assistance  - Assess pain using appropriate pain scale  - Administer analgesics based on type and severity of pain and evaluate response  - Implement non-pharmacological measures as appropriate and evaluate response  - Consider cultural and social influences on pain and pain management  - Notify physician/advanced practitioner if interventions unsuccessful or patient reports new pain  Outcome: Progressing      Problem: INFECTION - ADULT  Goal: Absence or prevention of progression during hospitalization  INTERVENTIONS:  - Assess and monitor for signs and symptoms of infection  - Monitor lab/diagnostic results  - Monitor all insertion sites, i e  indwelling lines, tubes, and drains  - Monitor endotracheal (as able) and nasal secretions for changes in amount and color  - [place] appropriate cooling/warming therapies per order  - Administer medications as ordered  - Instruct and encourage patient and family to use good hand hygiene technique  - Identify and instruct in appropriate isolation precautions for identified infection/condition  Outcome: Progressing    Goal: Absence of fever/infection during neutropenic period  INTERVENTIONS:  - Monitor WBC  - Implement neutropenic guidelines  Outcome: Progressing      Problem: SAFETY ADULT  Goal: Maintain or return to baseline ADL function  INTERVENTIONS:  -  Assess patient's ability to carry out ADLs; assess patient's baseline for ADL function and identify physical deficits which impact ability to perform ADLs (bathing, care of mouth/teeth, toileting, grooming, dressing, etc )  - Assess/evaluate cause of self-care deficits   - Assess range of motion  - Assess patient's mobility; develop plan if impaired  - Assess patient's need for assistive devices and provide as appropriate  - Encourage maximum independence but intervene and supervise when necessary  ¯ Involve family in performance of ADLs  ¯ Assess for home care needs following discharge   ¯ Request OT consult to assist with ADL evaluation and planning for discharge  ¯ Provide patient education as appropriate  Outcome: Progressing    Goal: Maintain or return mobility status to optimal level  INTERVENTIONS:  - Assess patient's baseline mobility status (ambulation, transfers, stairs, etc )    - Identify cognitive and physical deficits and behaviors that affect mobility  - Identify mobility aids required to assist with transfers and/or ambulation (gait belt, sit-to-stand, lift, walker, cane, etc )  - [place] fall precautions as indicated by assessment  - Record patient progress and toleration of activity level on Mobility SBAR; progress patient to next Phase/Stage  - Instruct patient to call for assistance with activity based on assessment  - Request Rehabilitation consult to assist with strengthening/weightbearing, etc   Outcome: Progressing      Problem: DISCHARGE PLANNING  Goal: Discharge to home or other facility with appropriate resources  INTERVENTIONS:  - Identify barriers to discharge w/patient and caregiver  - Arrange for needed discharge resources and transportation as appropriate  - Identify discharge learning needs (meds, wound care, etc )  - Arrange for interpretive services to assist at discharge as needed  - Refer to Case Management Department for coordinating discharge planning if the patient needs post-hospital services based on physician/advanced practitioner order or complex needs related to functional status, cognitive ability, or social support system  Outcome: Progressing      Problem: Knowledge Deficit  Goal: Patient/family/caregiver demonstrates understanding of disease process, treatment plan, medications, and discharge instructions  Complete learning assessment and assess knowledge base  Interventions:  - Provide teaching at level of understanding  - Provide teaching via preferred learning methods  Outcome: Progressing      Problem: CARDIOVASCULAR - ADULT  Goal: Maintains optimal cardiac output and hemodynamic stability  INTERVENTIONS:  - Monitor I/O, vital signs and rhythm  - Monitor for S/S and trends of decreased cardiac output i e  bleeding, hypotension  - Administer and titrate ordered vasoactive medications to optimize hemodynamic stability  - Assess quality of pulses, skin color and temperature  - Assess for signs of decreased coronary artery perfusion - ex   Angina  - Instruct patient to report change in severity of symptoms  Outcome: Progressing    Goal: Absence of cardiac dysrhythmias or at baseline rhythm  INTERVENTIONS:  - Continuous cardiac monitoring, monitor vital signs, obtain 12 lead EKG if indicated  - Administer antiarrhythmic and heart rate control medications as ordered  - Monitor electrolytes and administer replacement therapy as ordered  Outcome: Progressing

## 2018-12-30 VITALS
RESPIRATION RATE: 18 BRPM | HEIGHT: 73 IN | OXYGEN SATURATION: 98 % | BODY MASS INDEX: 26.77 KG/M2 | TEMPERATURE: 98.3 F | WEIGHT: 202 LBS | HEART RATE: 84 BPM | SYSTOLIC BLOOD PRESSURE: 139 MMHG | DIASTOLIC BLOOD PRESSURE: 79 MMHG

## 2018-12-30 DIAGNOSIS — I48.0 PAF (PAROXYSMAL ATRIAL FIBRILLATION) (HCC): Primary | ICD-10-CM

## 2018-12-30 PROBLEM — I47.1 SVT (SUPRAVENTRICULAR TACHYCARDIA) (HCC): Status: RESOLVED | Noted: 2018-12-28 | Resolved: 2018-12-30

## 2018-12-30 PROBLEM — I47.10 SVT (SUPRAVENTRICULAR TACHYCARDIA): Status: RESOLVED | Noted: 2018-12-28 | Resolved: 2018-12-30

## 2018-12-30 PROBLEM — I48.92 ATRIAL FLUTTER (HCC): Status: RESOLVED | Noted: 2018-12-28 | Resolved: 2018-12-30

## 2018-12-30 PROBLEM — I50.31 ACUTE DIASTOLIC (CONGESTIVE) HEART FAILURE (HCC): Status: RESOLVED | Noted: 2018-12-28 | Resolved: 2018-12-30

## 2018-12-30 PROCEDURE — 99239 HOSP IP/OBS DSCHRG MGMT >30: CPT | Performed by: NURSE PRACTITIONER

## 2018-12-30 PROCEDURE — 99232 SBSQ HOSP IP/OBS MODERATE 35: CPT | Performed by: INTERNAL MEDICINE

## 2018-12-30 RX ORDER — LOSARTAN POTASSIUM 25 MG/1
25 TABLET ORAL DAILY
Qty: 30 TABLET | Refills: 0 | Status: SHIPPED | OUTPATIENT
Start: 2018-12-31 | End: 2019-01-04 | Stop reason: SDUPTHER

## 2018-12-30 RX ORDER — DILTIAZEM HYDROCHLORIDE 120 MG/1
120 CAPSULE, COATED, EXTENDED RELEASE ORAL DAILY
Qty: 30 CAPSULE | Refills: 0 | Status: SHIPPED | OUTPATIENT
Start: 2018-12-31 | End: 2019-01-04 | Stop reason: SDUPTHER

## 2018-12-30 RX ADMIN — LOSARTAN POTASSIUM 25 MG: 25 TABLET ORAL at 08:36

## 2018-12-30 RX ADMIN — APIXABAN 5 MG: 5 TABLET, FILM COATED ORAL at 08:36

## 2018-12-30 RX ADMIN — DILTIAZEM HYDROCHLORIDE 120 MG: 120 CAPSULE, COATED, EXTENDED RELEASE ORAL at 08:36

## 2018-12-30 NOTE — DISCHARGE INSTRUCTIONS
Atrial Flutter   WHAT YOU NEED TO KNOW:   Atrial flutter is an irregular heartbeat  It reduces your heart's ability to pump blood, which means you do not get enough oxygen  An irregular heartbeat could lead to a life-threatening blood clot or stroke  DISCHARGE INSTRUCTIONS:   Call 911 for any of the following:   · You have any of the following signs of a stroke:      ¨ Numbness or drooping on one side of your face     ¨ Weakness in an arm or leg    ¨ Confusion or difficulty speaking    ¨ Dizziness, a severe headache, or vision loss    · You have any of the following signs of a heart attack:      ¨ Squeezing, pressure, or pain in your chest that lasts longer than 5 minutes or returns    ¨ Discomfort or pain in your back, neck, jaw, stomach, or arm     ¨ Trouble breathing    ¨ Nausea or vomiting    ¨ Lightheadedness or a sudden cold sweat, especially with chest pain or trouble breathing  Seek care immediately if:  You have any of the following signs of a blood clot:  · You feel lightheaded, are short of breath, and have chest pain  · You cough up blood  · You have swelling, redness, pain, or warmth in your arm or leg  Contact your cardiologist or healthcare provider if:   · Your heart rate is higher or lower than your cardiologist says it should be  · You are bruising and bleeding more easily  · You have questions or concerns about your condition or care  Medicines: You may need any of the following:  · Heart medicines  help control your heart rate and rhythm  · Blood thinners    help prevent blood clots  Examples of blood thinners include heparin and warfarin  Clots can cause strokes, heart attacks, and death  The following are general safety guidelines to follow while you are taking a blood thinner:    ¨ Watch for bleeding and bruising while you take blood thinners  Watch for bleeding from your gums or nose  Watch for blood in your urine and bowel movements   Use a soft washcloth on your skin, and a soft toothbrush to brush your teeth  This can keep your skin and gums from bleeding  If you shave, use an electric shaver  Do not play contact sports  ¨ Tell your dentist and other healthcare providers that you take anticoagulants  Wear a bracelet or necklace that says you take this medicine  ¨ Do not start or stop any medicines unless your healthcare provider tells you to  Many medicines cannot be used with blood thinners  ¨ Tell your healthcare provider right away if you forget to take the medicine, or if you take too much  ¨ Warfarin  is a blood thinner that you may need to take  The following are things you should be aware of if you take warfarin  § Foods and medicines can affect the amount of warfarin in your blood  Do not make major changes to your diet while you take warfarin  Warfarin works best when you eat about the same amount of vitamin K every day  Vitamin K is found in green leafy vegetables and certain other foods  Ask for more information about what to eat when you are taking warfarin  § You will need to see your healthcare provider for follow-up visits when you are on warfarin  You will need regular blood tests  These tests are used to decide how much medicine you need  · Take your medicine as directed  Contact your healthcare provider if you think your medicine is not helping or if you have side effects  Tell him or her if you are allergic to any medicine  Keep a list of the medicines, vitamins, and herbs you take  Include the amounts, and when and why you take them  Bring the list or the pill bottles to follow-up visits  Carry your medicine list with you in case of an emergency  Follow up with your cardiologist as directed: You may need ongoing tests or treatment  Write down your questions so you remember to ask them during your visits  Manage atrial flutter:   · Know your target heart rate    Learn how to take your pulse and monitor your heart rate     · Control your blood pressure  Take your blood pressure medicine as directed  · Do not smoke  Nicotine and other chemicals in cigarettes and cigars can cause heart and lung damage  Ask your healthcare provider for information if you currently smoke and need help to quit  E-cigarettes or smokeless tobacco still contain nicotine  Talk to your healthcare provider before you use these products  · Limit alcohol  Women should limit alcohol to 1 drink a day  Men should limit alcohol to 2 drinks a day  A drink of alcohol is 12 ounces of beer, 5 ounces of wine, or 1½ ounces of liquor  · Eat heart-healthy foods  Heart-healthy foods include fruits, vegetables, whole-grain breads, low-fat dairy products, beans, lean meats, and fish  Replace butter and margarine with heart-healthy oils such as olive oil and canola oil  · Maintain a healthy weight  Ask your healthcare provider how much you should weigh  Ask him to help you create a weight loss plan if you are overweight  © 2017 [address] [employer] Information is for End User's use only and may not be sold, redistributed or otherwise used for commercial purposes  All illustrations and images included in CareNotes® are the copyrighted property of [employer]  or [employer]  The above information is an  only  It is not intended as medical advice for individual conditions or treatments  Talk to your doctor, nurse or pharmacist before following any medical regimen to see if it is safe and effective for you

## 2018-12-30 NOTE — ASSESSMENT & PLAN NOTE
· Was noted in the emergency department, adenosine was given  · Likely atrial fibrillation  · On telemetry monitoring  · No further episodes

## 2018-12-30 NOTE — ASSESSMENT & PLAN NOTE
· On losartan at home, will continue at lower dose due to addition of diltiazem  · Monitor blood pressure at home

## 2018-12-30 NOTE — ASSESSMENT & PLAN NOTE
· Secondary to new onset atrial flutter - patient reported associated shortness of breath  · Elevated proBNP  · Patient appears euvolemic on examined currently asymptomatic, will not be initiating diuretics  · Echocardiogram to be done as an outpatient    · Cardiology consult performed

## 2018-12-30 NOTE — ASSESSMENT & PLAN NOTE
· As was witnessed in ED  · Patient was noted for a SVT at PCPs office with heart rate and 170  · Increased alcohol use reported, 2-3 drinks daily in the past couple of days versus normally 1 glass of wine daily  Also had low grade fever and possible dehydration  · SVT in the ED and received adenosine and with slower rate, atrial flutter was revealed  · Palpitations and mild shortness of breath during episode  · Heart rate controlled at 80 - patient was given IV Cardizem bolus but never started on Cardizem drip  · TSH normal  · Admitted to telemetry  · Cardizem 30 mg every 6 hours p o  Initiated, changed to Cardizem  mg daily  · [id number] is 1, cardiology initiated anticoagulation  · Atrial flutter and fibrillation noted  Converted to NSR around 0300 on 12/29  · Cardiology consult appreciated  Patient will be discharged on Cardizem  · ECHO to be done as an outpatient  · Follow up with EP, Dr Alyse Casper

## 2018-12-30 NOTE — PROGRESS NOTES
Progress Note - Cardiology   Sandie Viera 60 y o  male MRN: [id number]  Unit/Bed#: E4 -01 Encounter: [id number]      Assessment/Recommendations/Discussion:   1  Paroxysmal atrial flutter, subsequently atrial fibrillation on telemetry (ECG strip below) with subsequent conversion to sinus rhythm, maintaining the same  2  Benign essential hypertension  3  Mildly elevated NT proBNP    · Maintaining sinus rhythm, will discharge on oral diltiazem   D/C on lower losartan dose  · Will arrange outpatient echocardiogram and subsequent follow-up with Dr Aurelio Dominguez who is aware      Subjective:  Patient seen and examined, feels well      Physical Exam:  GEN:  NAD  HEENT:  MMM, NCAT, pink conjunctiva, EOMI, nonicteric sclera  CV:  NO JVD/HJR, RR, NO M/R/G, +S1/S2, NO PARASTERNAL HEAVE/THRILL, NO LE EDEMA, NO HEPATIC SYSTOLIC PULSATION, WARM EXTREMITIES  RESP:  CTAB/L  ABD:  SOFT, NT, NO GROSS ORGANOMEGALY        Vitals:   /79 (BP Location: Left arm)   Pulse 84   Temp 98 3 °F (36 8 °C) (Tympanic)   Resp 18   Ht 6' 1" (1 854 m)   Wt 91 6 kg (202 lb)   SpO2 98%   BMI 26 65 kg/m²   Vitals:    12/29/18 0557 12/30/18 0541   Weight: 94 1 kg (207 lb 7 3 oz) 91 6 kg (202 lb)       Intake/Output Summary (Last 24 hours) at 12/30/18 1440  Last data filed at 12/30/18 0533   Gross per 24 hour   Intake              840 ml   Output             2000 ml   Net            -1160 ml       TELEMETRY:  Sinus rhythm  Lab Results:    Results from last 7 days  Lab Units 12/29/18  0517   WBC Thousand/uL 8 56   HEMOGLOBIN g/dL 13 1   HEMATOCRIT % 38 7   PLATELETS Thousands/uL 202       Results from last 7 days  Lab Units 12/29/18  0517  12/25/18  2328   POTASSIUM mmol/L 4 0  < > 4 1   CHLORIDE mmol/L 106  < > 105   CO2 mmol/L 25  < > 28   BUN mg/dL 18  < > 16   CREATININE mg/dL 0 99  < > 1 31*   CALCIUM mg/dL 8 2*  < > 8 4   ALK PHOS U/L  --   --  78   ALT U/L  --   --  47   AST U/L  --   --  30   < > = values in this interval not displayed  Results from last 7 days  Lab Units 12/29/18  0517   POTASSIUM mmol/L 4 0   CHLORIDE mmol/L 106   CO2 mmol/L 25   BUN mg/dL 18   CREATININE mg/dL 0 99   CALCIUM mg/dL 8 2*           Medications:    Current Facility-Administered Medications:     acetaminophen (TYLENOL) tablet 650 mg, 650 mg, Oral, Q6H PRN, Junaid Estrada MD    apixaban (ELIQUIS) tablet 5 mg, 5 mg, Oral, BID, Rujul Cardenas, DO, 5 mg at 12/30/18 0836    atorvastatin (LIPITOR) tablet 10 mg, 10 mg, Oral, Daily With Gypsy Paulino MD, 10 mg at 12/29/18 1803    diltiazem (CARDIZEM CD) 24 hr capsule 120 mg, 120 mg, Oral, Daily, Rujul Cardenas, DO, 120 mg at 12/30/18 0836    diltiazem (CARDIZEM) 125 mg in sodium chloride 0 9 % 125 mL infusion, 1-15 mg/hr, Intravenous, Once, Medina Orlando MD, Stopped at 12/28/18 1554    losartan (COZAAR) tablet 25 mg, 25 mg, Oral, Daily, Rujul Cardenas, DO, 25 mg at 12/30/18 0836    methocarbamol (ROBAXIN) tablet 500 mg, 500 mg, Oral, Q6H PRN, Junaid Estrada MD    ondansetron (ZOFRAN) injection 4 mg, 4 mg, Intravenous, Q6H PRN, Junaid Estrada MD    This note was completed in part utilizing M[employer] Flu[employer] Direct Software  Grammatical errors, random word insertions, spelling mistakes, and incomplete sentences may be an occasional consequence of this system secondary to software limitations, ambient noise, and hardware issues  If you have any questions or concerns about the content, text, or information contained within the body of this dictation, please contact the provider for clarification

## 2018-12-30 NOTE — DISCHARGE SUMMARY
[facility]  Discharge- Tyrone Alexey 1960, 60 y o  male MRN: [id number]    Unit/Bed#: E4 -01 Encounter: [id number]    Primary Care Provider: Fawn Augustine DO   Date and time admitted to hospital: 12/28/2018  3:02 PM        SVT (supraventricular tachycardia) (HCC)resolved as of 12/30/2018   Assessment & Plan    · Was noted in the emergency department, adenosine was given  · Likely atrial fibrillation  · On telemetry monitoring  · No further episodes     * Atrial flutter (HCC)resolved as of 12/30/2018   Assessment & Plan    · As was witnessed in ED  · Patient was noted for a SVT at PCPs office with heart rate and 170  · Increased alcohol use reported, 2-3 drinks daily in the past couple of days versus normally 1 glass of wine daily  Also had low grade fever and possible dehydration  · SVT in the ED and received adenosine and with slower rate, atrial flutter was revealed  · Palpitations and mild shortness of breath during episode  · Heart rate controlled at 80 - patient was given IV Cardizem bolus but never started on Cardizem drip  · TSH normal  · Admitted to telemetry  · Cardizem 30 mg every 6 hours p o  Initiated, changed to Cardizem  mg daily  · [id number] is 1, cardiology initiated anticoagulation  · Atrial flutter and fibrillation noted  Converted to NSR around 0300 on 12/29  · Cardiology consult appreciated  Patient will be discharged on Cardizem  · ECHO to be done as an outpatient  · Follow up with Dr George HARRIS  Acute diastolic (congestive) heart failure (HCC)resolved as of 12/30/2018   Assessment & Plan    · Secondary to new onset atrial flutter - patient reported associated shortness of breath  · Elevated proBNP  · Patient appears euvolemic on examined currently asymptomatic, will not be initiating diuretics  · Echocardiogram to be done as an outpatient    · Cardiology consult performed     Essential hypertension   Assessment & Plan    · On losartan at home, will continue at lower dose due to addition of diltiazem  · Monitor blood pressure at home  Hypercholesterolemia   Assessment & Plan    · Continue atorvastatin 10 mg daily  · Lipid profile normal           Discharging Physician / Practitioner: Adi Verdin  PCP: Misty Kanner, DO  Admission Date:   Admission Orders     Ordered        12/28/18 1641  Inpatient Admission (expected length of stay for this patient is greater than two midnights)  Once             Discharge Date: 12/30/18    Resolved Problems  Date Reviewed: 12/30/2018          Resolved    SVT (supraventricular tachycardia) ([facility] [address] ) 12/30/2018     Resolved by  STEVE Verdin    * (Principal)Atrial flutter ([facility] [address] ) 12/30/2018     Resolved by  STEVE Verdin    Acute diastolic (congestive) heart failure ([facility] [address] ) 12/30/2018     Resolved by  Leonard Verdin [facility] Stay:  · Cardiology    Procedures Performed:     · CXR 12/28: No active pulmonary disease  Significant Findings / Test Results:     · Tachyarrhythmia  · Elevated BNP 2,398    Incidental Findings:   · None     Test Results Pending at Discharge (will require follow up): · None     Outpatient Tests Requested:  · Echocardiogram    Complications:  None    Reason for Admission: SVT    Hospital Course:     Sonny Orlando is a 60 y o  male patient who originally presented to the hospital on 12/28/2018 due to rapid heart rate  Patient had recently been seen in the emergency room for complaints of headache, neck pain, and back pain  Patient was following up with his primary care physician on December 27 when he was found to have a rapid rate of 170 and appeared to be in SVT  Patient was sent to the emergency room  An EKG was performed and again appear to be in SVT  The patient was given adenosine 6 mg and when the rate began to slow down  The underlying rhythm of atrial flutter was revealed    Patient was then given a Cardizem IV bolus x2 as well as a bolus of IV fluids  A Cardizem drip was ordered but not needed as the patient was with a controlled rate  The patient's history is pertinent for hypertension and hyperlipidemia  He was admitted for further observation and management of his atrial flutter  The patient was started on short-acting Cardizem every 6 hours  A consultation was requested with Cardiology and an echocardiogram was ordered  The chest x-ray performed while in the emergency room showed no active pulmonary disease  His labs demonstrated an elevated BNP of 2398  He had 1 troponin of 0 03  His lipid panel was unremarkable  A TSH was performed which was normal as well  After his admission while being monitored on telemetry, his atrial flutter changed to atrial fibrillation  At some point between 230 and 3 o'clock that following morning, he converted to a normal sinus rhythm  He remained in normal sinus rhythm until his discharge  He was seen by Dr Jamie Jean Baptiste, cardiologist   He was transitioned to a long-acting oral Cardizem  He was normally maintained on losartan for his blood pressure  This dose was decreased due to the addition of the Cardizem  The patient felt well enough to be discharged home  His echocardiogram will be performed as an outpatient  He is already established contact with Dr Luna Faria, electrophysiologist who he will follow up with  The patient was started on anticoagulation in the form of Eliquis by Dr Jamie Jean Baptiste  All questions were answered, prescriptions were provided, and contact information for appropriate follow-up was given to the patient prior to his departure  Please see above list of diagnoses and related plan for additional information  Condition at Discharge: stable     Discharge Day Visit / Exam:     Subjective:  Patient has no complaints  He denies pain, shortness of breath, palpitations or dizziness    Vitals: Blood Pressure: 123/83 (12/30/18 0740)  Pulse: 82 (12/30/18 0740)  Temperature: 97 8 °F (36 6 °C) (12/30/18 0740)  Temp Source: Tympanic (12/30/18 0740)  Respirations: 18 (12/30/18 0740)  Height: 6' 1" (185 4 cm) (12/28/18 1742)  Weight - Scale: 91 6 kg (202 lb) (12/30/18 0541)  SpO2: 98 % (12/30/18 0740)  Exam:   Physical Exam   Constitutional: He is oriented to person, place, and time  He appears well-developed and well-nourished  No distress  HENT:   Head: Normocephalic  Left Ear: External ear normal    Eyes: Conjunctivae are normal  No scleral icterus  Cardiovascular: Normal rate, regular rhythm and normal heart sounds  No murmur heard  Pulmonary/Chest: Effort normal and breath sounds normal  No respiratory distress  He has no wheezes  He has no rales  Abdominal: Soft  Bowel sounds are normal  He exhibits no distension  There is no tenderness  Musculoskeletal: Normal range of motion  He exhibits no edema or tenderness  Neurological: He is alert and oriented to person, place, and time  Skin: Skin is warm and dry  He is not diaphoretic  Psychiatric: He has a normal mood and affect  His behavior is normal    Nursing note and vitals reviewed  Discussion with Family: Plan of care with patient    Discharge instructions/Information to patient and family:   See after visit summary for information provided to patient and family  Provisions for Follow-Up Care:  See after visit summary for information related to follow-up care and any pertinent home health orders  Disposition:     Home    For Discharges to [facility]F:   · Not Applicable to this Patient - Not Applicable to this Patient    Planned Readmission: None     Discharge Statement:  I spent 60 minutes discharging the patient  This time was spent on the day of discharge  I had direct contact with the patient on the day of discharge   Greater than 50% of the total time was spent examining patient, answering all patient questions, arranging and discussing plan of care with patient as well as directly providing post-discharge instructions  Additional time then spent on discharge activities  Discharge Medications:  See after visit summary for reconciled discharge medications provided to patient and family        ** Please Note: This note has been constructed using a voice recognition system **

## 2018-12-31 ENCOUNTER — TRANSITIONAL CARE MANAGEMENT (OUTPATIENT)
Dept: FAMILY MEDICINE CLINIC | Facility: CLINIC | Age: 58
End: 2018-12-31

## 2018-12-31 LAB
BACTERIA BLD CULT: NORMAL
BACTERIA BLD CULT: NORMAL

## 2019-01-02 ENCOUNTER — HOSPITAL ENCOUNTER (OUTPATIENT)
Dept: NON INVASIVE DIAGNOSTICS | Facility: CLINIC | Age: 59
Discharge: HOME | End: 2019-01-02
Payer: COMMERCIAL

## 2019-01-02 DIAGNOSIS — I48.92 ATRIAL FLUTTER, UNSPECIFIED TYPE (HCC): ICD-10-CM

## 2019-01-02 PROCEDURE — 93306 TTE W/DOPPLER COMPLETE: CPT

## 2019-01-02 PROCEDURE — 93306 TTE W/DOPPLER COMPLETE: CPT | Performed by: INTERNAL MEDICINE

## 2019-01-03 NOTE — ED ATTENDING ATTESTATION
Jessica Carrillo DO, saw and evaluated the patient  I have discussed the patient with the resident/non-physician practitioner and agree with the resident's/non-physician practitioner's findings, Plan of Care, and MDM as documented in the resident's/non-physician practitioner's note, except where noted  All available labs and Radiology studies were reviewed  At this point I agree with the current assessment done in the Emergency Department  I have conducted an independent evaluation of this patient a history and physical is as follows:      60 yom sent in by pcp for svt  Patient reported palpitations, ekg at office showsed narrow complex tachycardia  Pt reports dizziness  No sob, chest pain  On eval, VS normal other than tachycardia  Adenosine was given therapeutically and diagnostically  Showed flutter waves  Initial rhythym likely aflutter 2:1  Patient does report significant alcohol intake  A/P: tachycardia  Aflutter, tx with cardzem  Admit  Critical Care Time  The patient presented with a condition in which there was a high probability of imminent or life-threatening deterioration, and critical care services (excluding separately billable procedures) totalled 30-74 minutes          Procedures

## 2019-01-04 ENCOUNTER — OFFICE VISIT (OUTPATIENT)
Dept: FAMILY MEDICINE CLINIC | Facility: CLINIC | Age: 59
End: 2019-01-04
Payer: COMMERCIAL

## 2019-01-04 ENCOUNTER — OFFICE VISIT (OUTPATIENT)
Dept: CARDIOLOGY CLINIC | Facility: CLINIC | Age: 59
End: 2019-01-04
Payer: COMMERCIAL

## 2019-01-04 VITALS
WEIGHT: 204.4 LBS | HEIGHT: 73 IN | DIASTOLIC BLOOD PRESSURE: 76 MMHG | BODY MASS INDEX: 27.09 KG/M2 | HEART RATE: 61 BPM | SYSTOLIC BLOOD PRESSURE: 132 MMHG

## 2019-01-04 VITALS
HEIGHT: 73 IN | OXYGEN SATURATION: 98 % | SYSTOLIC BLOOD PRESSURE: 114 MMHG | WEIGHT: 203.4 LBS | HEART RATE: 78 BPM | DIASTOLIC BLOOD PRESSURE: 74 MMHG | BODY MASS INDEX: 26.96 KG/M2

## 2019-01-04 DIAGNOSIS — E78.00 HYPERCHOLESTEROLEMIA: ICD-10-CM

## 2019-01-04 DIAGNOSIS — R00.2 HEART PALPITATIONS: ICD-10-CM

## 2019-01-04 DIAGNOSIS — I10 ESSENTIAL HYPERTENSION: ICD-10-CM

## 2019-01-04 DIAGNOSIS — I48.92 ATRIAL FLUTTER, UNSPECIFIED TYPE (HCC): ICD-10-CM

## 2019-01-04 DIAGNOSIS — I48.92 ATRIAL FLUTTER, UNSPECIFIED TYPE (HCC): Primary | ICD-10-CM

## 2019-01-04 DIAGNOSIS — E78.00 HYPERCHOLESTEROLEMIA: Primary | ICD-10-CM

## 2019-01-04 DIAGNOSIS — R19.8 PULSATILE ABDOMEN: ICD-10-CM

## 2019-01-04 PROCEDURE — 99495 TRANSJ CARE MGMT MOD F2F 14D: CPT | Performed by: FAMILY MEDICINE

## 2019-01-04 PROCEDURE — 99244 OFF/OP CNSLTJ NEW/EST MOD 40: CPT | Performed by: INTERNAL MEDICINE

## 2019-01-04 PROCEDURE — 93000 ELECTROCARDIOGRAM COMPLETE: CPT | Performed by: INTERNAL MEDICINE

## 2019-01-04 RX ORDER — LOSARTAN POTASSIUM 25 MG/1
25 TABLET ORAL DAILY
Qty: 90 TABLET | Refills: 3 | Status: SHIPPED | OUTPATIENT
Start: 2019-01-04 | End: 2019-01-23 | Stop reason: SDUPTHER

## 2019-01-04 RX ORDER — DILTIAZEM HYDROCHLORIDE 120 MG/1
120 CAPSULE, COATED, EXTENDED RELEASE ORAL DAILY
Qty: 90 CAPSULE | Refills: 3 | Status: SHIPPED | OUTPATIENT
Start: 2019-01-04 | End: 2019-01-23 | Stop reason: SDUPTHER

## 2019-01-04 RX ORDER — ATORVASTATIN CALCIUM 10 MG/1
10 TABLET, FILM COATED ORAL DAILY
Qty: 90 TABLET | Refills: 3 | Status: SHIPPED | OUTPATIENT
Start: 2019-01-04 | End: 2019-01-23 | Stop reason: SDUPTHER

## 2019-01-04 NOTE — PROGRESS NOTES
FAMILY PRACTICE OFFICE VISIT  [address]  [address]  [place], [place], [address]      NAME: Cherri Pearl  AGE: 60 y o  SEX: male  : 1960   MRN: [id number]    DATE: 2019  TIME: 5:07 PM    Assessment and Plan     Problem List Items Addressed This Visit        Unprioritized    Atrial flutter [facility]) - Primary          Patient Instructions   He already saw Cardiology in follow-up earlier today, treatment as in their plan, use medication as is, call us if any new symptoms arise or if we can answer any questions  Otherwise will see him again in May with annual physical       Chief Complaint     Chief Complaint   Patient presents with    Transition of Care Management     atrial flutter        History of Present Illness   Cherri Pearl is a 60y o -year-old male who is in for re-evaluation after hospital stay, at last visit he was sent directly to the hospital with rapid heart rate, treatment unmasked atrial fib/flutter, started Cardizem  daily, losartan dose was decreased 25 daily  He was started on anticoagulation with Eliquis 5 twice daily  He is feeling well, was out for a walk yesterday without symptomatology  He did see Cardiology already today in follow-up, is doing cardiac monitor, may require ablation      Review of Systems   Review of Systems   Constitutional: Negative for activity change, fatigue and unexpected weight change  HENT: Negative for congestion and sore throat  Respiratory: Negative for cough, chest tightness, shortness of breath and wheezing  Cardiovascular: Negative for chest pain and leg swelling  Notes no palpitations since discharge   Gastrointestinal: Negative for abdominal pain  No change in bowel, no blood in stool, no acid reflux   Genitourinary: Negative for dysuria and hematuria     Neurological: Negative for dizziness, syncope, light-headedness and headaches  Hematological: Bruises/bleeds easily (No bleeding)  Psychiatric/Behavioral: Negative for behavioral problems and confusion  Active Problem List     Patient Active Problem List   Diagnosis    Allergic rhinitis    Hypercholesterolemia    Essential hypertension    Migraine headache    Nasal polyp    BPH associated with nocturia    Kidney stone    Pulmonary nodule    Atrial flutter (HCC)       Past Medical History:  Past Medical History:   Diagnosis Date    Hyperlipidemia     Hypertension        Past Surgical History:  Past Surgical History:   Procedure Laterality Date    CHOLECYSTECTOMY  1991    COLONOSCOPY      x2 - last was 2008 (neg)    NOSE SURGERY      See ENT note 2016       Family History:  Family History   Problem Relation Age of Onset    Other Mother         Sarcoma    Hypertension Father        Social History:  Social History     Social History    Marital status: Single     Spouse name: N/A    Number of children: N/A    Years of education: N/A     Occupational History    [employer] All Employees     Social History Main Topics    Smoking status: Former Smoker     Quit date: 12/2014    Smokeless tobacco: Never Used    Alcohol use 4 2 oz/week     7 Glasses of wine per week      Comment: Social drinker    Drug use: No    Sexual activity: Not on file     Other Topics Concern    Not on file     Social History Narrative    Recreational activities:  Walking       Objective     Vitals:    01/04/19 1429   BP: 114/74   BP Location: Left arm   Patient Position: Sitting   Cuff Size: Large   Pulse: 78   SpO2: 98%   Weight: 92 3 kg (203 lb 6 4 oz)   Height: 6' 1" (1 854 m)     Body mass index is 26 84 kg/m²      BP Readings from Last 3 Encounters:   01/04/19 114/74   01/04/19 132/76   12/30/18 139/79       Wt Readings from Last 3 Encounters:   01/04/19 92 3 kg (203 lb 6 4 oz)   01/04/19 92 7 kg (204 lb 6 4 oz)   12/30/18 91 6 kg (202 lb)       Physical Exam Constitutional: He is oriented to person, place, and time  He appears well-developed and well-nourished  No distress  HENT:   TM clear b/l   Eyes: Conjunctivae are normal  No scleral icterus  Neck: Carotid bruit is not present  Cardiovascular:   Regular rhythm with rare ectopic in office here today, no murmur   Pulmonary/Chest: Effort normal and breath sounds normal  No respiratory distress  He has no wheezes  He has no rales  Abdominal: Soft  There is no tenderness  Musculoskeletal: He exhibits no edema  Neurological: He is alert and oriented to person, place, and time  Skin: He is not diaphoretic  Psychiatric: He has a normal mood and affect  His behavior is normal    Nursing note and vitals reviewed  ALLERGIES:  Allergies   Allergen Reactions    Dust Mite Extract     Lisinopril Edema     Annotation - [id number]: lip swelling x 2 2015    Short Ragweed Pollen Ext        Current Medications     Current Outpatient Prescriptions   Medication Sig Dispense Refill    apixaban (ELIQUIS) 5 mg Take 1 tablet (5 mg total) by mouth 2 (two) times a day 180 tablet 3    atorvastatin (LIPITOR) 10 mg tablet Take 1 tablet (10 mg total) by mouth daily 90 tablet 3    diltiazem (CARDIZEM CD) 120 mg 24 hr capsule Take 1 capsule (120 mg total) by mouth daily 90 capsule 3    losartan (COZAAR) 25 mg tablet Take 1 tablet (25 mg total) by mouth daily 90 tablet 3    EPINEPHrine (EPIPEN) 0 3 mg/0 3 mL SOAJ Inject as directed       No current facility-administered medications for this visit                Most recent labs available from [address]   ( others may be available in [facility] / Media sections)  Lab Results   Component Value Date    WBC 8 56 12/29/2018    HGB 13 1 12/29/2018    HCT 38 7 12/29/2018     12/29/2018    CHOL 154 06/18/2015    TRIG 95 12/29/2018    HDL 49 12/29/2018    ALT 47 12/25/2018    AST 30 12/25/2018     06/18/2015    K 4 0 12/29/2018     12/29/2018 CREATININE 0 99 12/29/2018    BUN 18 12/29/2018    CO2 25 12/29/2018    PSA 0 8 05/07/2018    GLUF 99 05/07/2018    HGBA1C 5 3 05/07/2018     Lab Results   Component Value Date    LDLCALC 75 12/29/2018     Lab Results   Component Value Date    JKL1MLGRLCBA 3 230 12/29/2018         No orders of the defined types were placed in this encounter          Everette Rossi DO

## 2019-01-04 NOTE — PATIENT INSTRUCTIONS
He already saw Cardiology in follow-up earlier today, treatment as in their plan, use medication as is, call us if any new symptoms arise or if we can answer any questions    Otherwise will see him again in May with annual physical

## 2019-01-04 NOTE — PROGRESS NOTES
EPS Consultation     [place]  [phone]  1960  HEART & VASCULAR Zandra Cooks  [facility]  [address] [facility] atrial flutter  On Christmas night had neck pain, lower back pain and headache  Excruciating  Went to ED  Got a migraine cocktail  2 days later SOB while trying to go to sleep  Went to PCP on Friday the 28th and had HR of 170 bpm   3 am HR atrial flutter then back to regular rhythm by ecg at 8 pm that night  Has had some palpitations since then  Lasted approximately one hour  Family Hx:  Father - rheumatic fever as child and valve replacement with passing at 39    [address] [place]:  HTN, high cholesterol    SocHx:  Former smoker 1/2  Ppd for 10-15 years quit 4 years  ETOH - 7 glasses of wine per week        1   Atrial flutter, unspecified type [facility])  Ambulatory referral to Cardiac Electrophysiology    POCT ECG     Patient Active Problem List   Diagnosis    Allergic rhinitis    Hypercholesterolemia    Essential hypertension    Migraine headache    Nasal polyp    BPH associated with nocturia    Kidney stone    Pulmonary nodule     Past Medical History:   Diagnosis Date    Hyperlipidemia     Hypertension      Social History     Social History    Marital status: Single     Spouse name: N/A    Number of children: N/A    Years of education: N/A     Occupational History    [employer]     Social History Main Topics    Smoking status: Former Smoker     Quit date: 12/2014    Smokeless tobacco: Never Used    Alcohol use 4 2 oz/week     7 Glasses of wine per week      Comment: Social drinker    Drug use: No    Sexual activity: Not on file     Other Topics Concern    Not on file     Social History Narrative    Recreational activities:  Walking      Family History   Problem Relation Age of Onset    Other Mother         Sarcoma    Hypertension Father      Past Surgical History:   Procedure Laterality Date    CHOLECYSTECTOMY  1991    COLONOSCOPY      x2 - last was 2008 (neg)    NOSE SURGERY      See ENT note 2016       Current Outpatient Prescriptions:     apixaban (ELIQUIS) 5 mg, Take 1 tablet (5 mg total) by mouth 2 (two) times a day, Disp: 60 tablet, Rfl: 0    atorvastatin (LIPITOR) 10 mg tablet, Take 1 tablet (10 mg total) by mouth daily, Disp: 30 tablet, Rfl: 11    diltiazem (CARDIZEM CD) 120 mg 24 hr capsule, Take 1 capsule (120 mg total) by mouth daily, Disp: 30 capsule, Rfl: 0    EPINEPHrine (EPIPEN) 0 3 mg/0 3 mL SOAJ, Inject as directed, Disp: , Rfl:     losartan (COZAAR) 25 mg tablet, Take 1 tablet (25 mg total) by mouth daily, Disp: 30 tablet, Rfl: 0    methocarbamol (ROBAXIN) 500 mg tablet, Take 1 tablet by mouth, Disp: , Rfl:   Allergies   Allergen Reactions    Dust Mite Extract     Lisinopril Edema     Annotation - [id number]: lip swelling x 2 2015    Short Ragweed Pollen Ext      Vitals:    01/04/19 0758   BP: 132/76   BP Location: Right arm   Patient Position: Sitting   Cuff Size: Large   Pulse: 61   Weight: 92 7 kg (204 lb 6 4 oz)   Height: 6' 1" (1 854 m)       Labs:  Admission on 12/28/2018, Discharged on 12/30/2018   Component Date Value    Sodium 12/28/2018 139     Potassium 12/28/2018 4 0     Chloride 12/28/2018 104     CO2 12/28/2018 25     ANION GAP 12/28/2018 10     BUN 12/28/2018 22     Creatinine 12/28/2018 1 10     Glucose 12/28/2018 93     Calcium 12/28/2018 8 7     eGFR 12/28/2018 73     WBC 12/28/2018 11 30*    RBC 12/28/2018 4 54     Hemoglobin 12/28/2018 14 9     Hematocrit 12/28/2018 42 9     MCV 12/28/2018 95     MCH 12/28/2018 32 8     MCHC 12/28/2018 34 7     RDW 12/28/2018 13 3     MPV 12/28/2018 10 0     Platelets 96/88/3330 260     nRBC 12/28/2018 0     Neutrophils Relative 12/28/2018 51     Immat GRANS % 12/28/2018 0     Lymphocytes Relative 12/28/2018 40     Monocytes Relative 12/28/2018 8     Eosinophils Relative 12/28/2018 1     Basophils Relative 12/28/2018 0     Neutrophils Absolute 12/28/2018 5 76     Immature Grans Absolute 12/28/2018 0 05     Lymphocytes Absolute 12/28/2018 4 47     Monocytes Absolute 12/28/2018 0 88     Eosinophils Absolute 12/28/2018 0 10     Basophils Absolute 12/28/2018 0 04     TSH 3RD GENERATON 12/28/2018 3 032     Free T4 12/28/2018 0 96     Magnesium 12/28/2018 1 8     Troponin I 12/28/2018 0 03     NT-proBNP 12/28/2018 2398*    Sodium 12/29/2018 139     Potassium 12/29/2018 4 0     Chloride 12/29/2018 106     CO2 12/29/2018 25     ANION GAP 12/29/2018 8     BUN 12/29/2018 18     Creatinine 12/29/2018 0 99     Glucose 12/29/2018 91     Calcium 12/29/2018 8 2*    eGFR 12/29/2018 83     WBC 12/29/2018 8 56     RBC 12/29/2018 3 98     Hemoglobin 12/29/2018 13 1     Hematocrit 12/29/2018 38 7     MCV 12/29/2018 97     MCH 12/29/2018 32 9     MCHC 12/29/2018 33 9     RDW 12/29/2018 13 4     MPV 12/29/2018 10 0     Platelets 95/73/6724 202     nRBC 12/29/2018 0     Neutrophils Relative 12/29/2018 52     Immat GRANS % 12/29/2018 0     Lymphocytes Relative 12/29/2018 39     Monocytes Relative 12/29/2018 6     Eosinophils Relative 12/29/2018 2     Basophils Relative 12/29/2018 1     Neutrophils Absolute 12/29/2018 4 49     Immature Grans Absolute 12/29/2018 0 02     Lymphocytes Absolute 12/29/2018 3 33     Monocytes Absolute 12/29/2018 0 52     Eosinophils Absolute 12/29/2018 0 16     Basophils Absolute 12/29/2018 0 04     TSH 3RD GENERATON 12/29/2018 3 230     Magnesium 12/29/2018 2 0     Cholesterol 12/29/2018 143     Triglycerides 12/29/2018 95     HDL, Direct 12/29/2018 49     LDL Calculated 12/29/2018 75     Non-HDL-Chol (CHOL-HDL) 12/29/2018 94     Ventricular Rate 12/28/2018 169     Atrial Rate 12/28/2018 169     SD Interval 12/28/2018 150     QRSD Interval 12/28/2018 164     QT Interval 12/28/2018 270     QTC Interval 12/28/2018 452     P Axis 12/28/2018 259     QRS Axis 12/28/2018 -5     T Wave Axis 12/28/2018 -83     Ventricular Rate 12/28/2018 118     Atrial Rate 12/28/2018 357     QRSD Interval 12/28/2018 108     QT Interval 12/28/2018 312     QTC Interval 12/28/2018 437     P Axis 12/28/2018 252     QRS Axis 12/28/2018 2     T Wave Axis 12/28/2018 -4     Ventricular Rate 12/28/2018 84     Atrial Rate 12/28/2018 84     NV Interval 12/28/2018 124     QRSD Interval 12/28/2018 86     QT Interval 12/28/2018 362     QTC Interval 12/28/2018 427     P Axis 12/28/2018 64     QRS Axis 12/28/2018 24     T Wave [place] 12/28/2018 20    Admission on 12/25/2018, Discharged on 12/26/2018   Component Date Value    WBC 12/25/2018 9 37     RBC 12/25/2018 4 23     Hemoglobin 12/25/2018 13 8     Hematocrit 12/25/2018 40 4     MCV 12/25/2018 96     MCH 12/25/2018 32 6     MCHC 12/25/2018 34 2     RDW 12/25/2018 13 1     MPV 12/25/2018 9 7     Platelets 56/72/3518 220     nRBC 12/25/2018 0     Neutrophils Relative 12/25/2018 63     Immat GRANS % 12/25/2018 0     Lymphocytes Relative 12/25/2018 28     Monocytes Relative 12/25/2018 6     Eosinophils Relative 12/25/2018 2     Basophils Relative 12/25/2018 1     Neutrophils Absolute 12/25/2018 5 87     Immature Grans Absolute 12/25/2018 0 04     Lymphocytes Absolute 12/25/2018 2 66     Monocytes Absolute 12/25/2018 0 53     Eosinophils Absolute 12/25/2018 0 22     Basophils Absolute 12/25/2018 0 05     Blood Culture 12/25/2018 No Growth After 5 Days   Blood Culture 12/25/2018 No Growth After 5 Days       Rapid Influenza A Ag 12/25/2018 Negative     Rapid Influenza B Ag 12/25/2018 Negative     Sodium 12/25/2018 140     Potassium 12/25/2018 4 1     Chloride 12/25/2018 105     CO2 12/25/2018 28     ANION GAP 12/25/2018 7     BUN 12/25/2018 16     Creatinine 12/25/2018 1 31*    Glucose 12/25/2018 128     Calcium 12/25/2018 8 4     AST 12/25/2018 30  ALT 2018 47     Alkaline Phosphatase 2018 78     Total Protein 2018 7 2     Albumin 2018 3 5     Total Bilirubin 2018 0 26     eGFR 2018 59     Magnesium 2018 1 8     LACTIC ACID 2018 1 4     INFLU A PCR 2018 None Detected     INFLU B PCR 2018 None Detected     RSV PCR 2018 None Detected     Color, UA 2018 Yellow     Clarity, UA 2018 Clear     Specific Gravity, UA 2018 1 020     pH, UA 2018 7 5     Leukocytes, UA 2018 Negative     Nitrite, UA 2018 Negative     Protein, UA 2018 Negative     Glucose, UA 2018 Negative     Ketones, UA 2018 Negative     Urobilinogen, UA 2018 0 2     Bilirubin, UA 2018 Negative     Blood, UA 2018 Small*    RBC, UA 2018 2-4*    WBC, UA 2018 0-1*    Epithelial Cells 2018 None Seen     Bacteria, UA 2018 None Seen     Ventricular Rate 2018 116     Atrial Rate 2018 116     FL Interval 2018 128     QRSD Interval 2018 88     QT Interval 2018 292     QTC Interval 2018 405     P Axis 2018 76     QRS Axis 2018 50     T Wave Axis 2018 [address]  [address] [place] [address]  [phone]     Transthoracic Echocardiogram  2D, M-mode, Doppler, and Color Doppler     Study date:  2019     Patient: Sarah Mejia  MR number: [id number]  Account number: [de-identified]  : 1960  Age: 60 years  Gender: Male  Status: Outpatient  Location: [address] [place] [address] [facility] [place]  Height: 73 in  Weight: 202 lb  BP: 139/ 79 mmHg     Indications: Atrial flutter     Diagnoses: I48 1 - Atrial flutter     Sonographer:  Duayne Sandifer, RCS  Interpreting Physician:  Inga Meeks DO  Primary Physician:  Rickey Brooks DO  Referring Physician:  Amelie Morejon DO  Group:  [facility]  cc: Adi Rooney DO  Cardiology Fellow:  Shayan Iraheta MD     SUMMARY     LEFT VENTRICLE:  Systolic function was normal  Ejection fraction was estimated to be 55 %  There were no regional wall motion abnormalities      RIGHT VENTRICLE:  The size was normal   Systolic function was normal      HISTORY: PRIOR HISTORY: Hypertension     PROCEDURE: The study was performed in the [address]  This was a routine study  The transthoracic approach was used  The study included complete 2D imaging, M-mode, complete spectral Doppler, and color Doppler  The  heart rate was 68 bpm, at the start of the study  Images were obtained from the parasternal, apical, subcostal, and suprasternal notch acoustic windows  Echocardiographic views were limited due to poor acoustic window availability  Image  quality was adequate      LEFT VENTRICLE: Size was normal  Systolic function was normal  Ejection fraction was estimated to be 55 %  There were no regional wall motion abnormalities  Wall thickness was normal  DOPPLER: Left ventricular diastolic function parameters  were normal for the patient's age      RIGHT VENTRICLE: The size was normal  Systolic function was normal  Wall thickness was normal      LEFT ATRIUM: Size was normal      RIGHT ATRIUM: Size was normal      MITRAL VALVE: Valve structure was normal  There was normal leaflet separation  DOPPLER: The transmitral velocity was within the normal range  There was no evidence for stenosis  There was trace regurgitation      AORTIC VALVE: The valve was trileaflet  Leaflets exhibited mildly increased thickness, mild calcification, and normal cuspal separation  DOPPLER: Transaortic velocity was within the normal range  There was no evidence for stenosis  There  was no significant regurgitation      TRICUSPID VALVE: The valve structure was normal  There was normal leaflet separation  DOPPLER: The transtricuspid velocity was within the normal range   There was no evidence for stenosis  There was no significant regurgitation      PULMONIC VALVE: Leaflets exhibited normal thickness, no calcification, and normal cuspal separation  DOPPLER: The transpulmonic velocity was within the normal range  There was no significant regurgitation      PERICARDIUM: There was no pericardial effusion      AORTA: The root exhibited normal size      SYSTEMIC VEINS: IVC: The inferior vena cava was normal in size      SYSTEM MEASUREMENT TABLES     2D  %FS: 30 2 %  Ao Diam: 3 29 cm  EDV(Teich): 120 65 ml  EF(Cube): 65 99 %  EF(Teich): 57 24 %  ESV(Cube): 43 64 ml  ESV(Teich): 51 59 ml  IVSd: 0 88 cm  LA Area: 17 26 cm2  LA Diam: 3 48 cm  LVEDV MOD A4C: 109 23 ml  LVEF MOD A4C: 49 2 %  LVESV MOD A4C: 55 5 ml  LVIDd: 5 04 cm  LVIDs: 3 52 cm  LVLd A4C: 7 81 cm  LVLs A4C: 6 85 cm  LVPWd: 0 97 cm  RA Area: 13 92 cm2  RV Diam : 3 75 cm  SV MOD A4C: 53 74 ml  SV(Cube): 84 66 ml  SV(Teich): 69 06 ml     CW  TR Vmax: 2 18 m/s  TR maxP 08 mmHg     MM  TAPSE: 1 98 cm     PW  E': 0 06 m/s  E/E': 8 6  MV A [place]: 0 51 m/s  MV Dec [place]: 1 8 m/s2  MV DecT: 281 82 ms  MV E Moses: 0 51 m/s  MV E/A Ratio: 1     [facility] Accredited [facility]     Prepared and electronically signed by  Seun Whittington DO  Signed 2019 10:03:56  Imaging: Xr Chest Portable    Result Date: 2018  Narrative: CHEST INDICATION:   Cardiac workup  COMPARISON:  Chest x-ray of 2018 EXAM PERFORMED/VIEWS:  XR CHEST PORTABLE FINDINGS: Cardiomediastinal silhouette appears unremarkable  Left hemidiaphragm is mildly elevated  The lungs are clear  No pneumothorax or pleural effusion  Osseous structures appear within normal limits for patient age  Impression: No active pulmonary disease  Workstation performed: [id number]     Xr Chest 2 Views    Result Date: 2018  Narrative: CHEST INDICATION:   fever, chills   COMPARISON:  None EXAM PERFORMED/VIEWS:  XR CHEST PA & LATERAL FINDINGS: Cardiomediastinal silhouette appears unremarkable  The lungs are clear  No pneumothorax or pleural effusion  Osseous structures appear within normal limits for patient age  Impression: No acute cardiopulmonary disease  Workstation performed: [id number]     Ct Head Without Contrast    Result Date: 12/26/2018  Narrative: CT BRAIN - WITHOUT CONTRAST INDICATION:   headache  intractable  COMPARISON:  CT sinuses dated 5/24/2016 TECHNIQUE:  CT examination of the brain was performed  In addition to axial images, coronal 2D reformatted images were created and submitted for interpretation  Radiation dose length product (DLP) for this visit:  987 mGy-cm   This examination, like all CT scans performed in the [facility], was performed utilizing techniques to minimize radiation dose exposure, including the use of iterative reconstruction and automated exposure control  IMAGE QUALITY:  Diagnostic  FINDINGS: PARENCHYMA:  No intracranial mass, mass effect or midline shift  No CT signs of acute infarction  No acute parenchymal hemorrhage  VENTRICLES AND EXTRA-AXIAL SPACES:  Normal for the patient's age  VISUALIZED ORBITS AND PARANASAL SINUSES:  Moderate to large polyp/mucous retention cyst in the left maxillary sinus with associated moderate mucosal thickening in the bilateral maxillary sinuses  There is opacification of multiple bilateral ethmoid air cells with some moderate mucosal thickening in the bilateral frontal sinuses and mild to moderate mucosal thickening in the bilateral sphenoid sinuses  The mastoid air cells appear grossly clear  The orbits appear unremarkable  CALVARIUM AND EXTRACRANIAL SOFT TISSUES:  Normal      Impression: Chronic appearing sinus disease as described but no acute intracranial process is seen   Workstation performed: [id number]     Ct Chest Abdomen Pelvis W Contrast    Result Date: 12/26/2018  Narrative: CT CHEST, ABDOMEN AND PELVIS WITH IV CONTRAST INDICATION:   urinary retention    Lumbar pain with radiculopathy  COMPARISON:  None  TECHNIQUE: CT examination of the chest, abdomen and pelvis was performed  Axial, sagittal, and coronal 2D reformatted images were created from the source data and submitted for interpretation  Radiation dose length product (DLP) for this visit:  764 mGy-cm   This examination, like all CT scans performed in the [facility], was performed utilizing techniques to minimize radiation dose exposure, including the use of iterative reconstruction and automated exposure control  IV Contrast:  100 mL of iohexol (OMNIPAQUE) Enteric Contrast: Enteric contrast was not administered  FINDINGS: CHEST LUNGS:  Left basilar linear atelectasis  5 mm nodule in the lingula (2:30 PLEURA:  Unremarkable  HEART/GREAT VESSELS:  Unremarkable for patient's age  MEDIASTINUM AND AGUSTIN:  Unremarkable  CHEST WALL AND LOWER NECK:   Unremarkable  ABDOMEN LIVER/BILIARY TREE:  There are one or more hepatic simple cyst(s) present  No CT evidence of suspicious solid hepatic mass  Normal hepatic contours  No biliary dilatation  GALLBLADDER:  Gallbladder is surgically absent  SPLEEN:  Unremarkable  PANCREAS:  Unremarkable  ADRENAL GLANDS:  Unremarkable  KIDNEYS/URETERS:  2 mm nonobstructing stone interpolar region of left kidney  Kidneys are otherwise unremarkable  No hydronephrosis or hydroureter  STOMACH AND BOWEL:  There is colonic diverticulosis without evidence of acute diverticulitis  APPENDIX:  A normal appendix was visualized  ABDOMINOPELVIC CAVITY:  No ascites or free intraperitoneal air  No lymphadenopathy  VESSELS:  Unremarkable for patient's age  PELVIS REPRODUCTIVE ORGANS:  Calcifications in the penis raise concern for the possibility of Peyronie's disease  URINARY BLADDER:  Unremarkable  ABDOMINAL WALL/INGUINAL REGIONS:  Unremarkable  OSSEOUS STRUCTURES:  No acute fracture or destructive osseous lesion       Impression: No cord compression/cauda equina syndrome visualized on this CT of the chest abdomen and pelvis  Consider either dedicated small field-of-view thoracic and lumbar CTs (which can be reconstructed from this exam) and/or MRI of the thoracic/lumbar spine with contrast as indicated clinically  2 mm nonobstructing stone interpolar region of left kidney  No hydronephrosis or hydroureter  Bladder is nondistended  Workstation performed: [id number]       Review of Systems:  Review of Systems  Snoring, rhythm problems, back pain, did have fever and chills with neck and back pain  Occasional SOB with stairs  No angina  Physical Exam:  Physical Exam    GEN: NAD, Alert and oriented, well appearing  HEENT:Head, neck, ears, oral pharynx: Mucus membranes moist, oral pharynx clear, nares clear  External ears normal  EYES: Pupils equal, sclera anicteric  NECK: No JVD  CARDIOVASCULAR: RRR, No murmur, rub, gallops S1,S2  LUNGS: Clear To auscultation bilaterally, no rales no rhonchi no wheezes  ABDOMEN: Soft, nondistended, mildly tender with deep palpation abdominal aorta prominent and pulse  EXTREMITIES/VASCULAR: No edema  PSYCH: Normal Affect  NEURO: Grossly intact, moving all extremiteis equal, face symetric  HEME: No bleeding, bruising, petechia  SKIN: No significant rashes      Discussion/Summary:  1  Atrial flutter symptomatic paroxysmal he has had one episode he does have occasional heart palpitations and therefore we also need to look for atrial fibrillation I am going to order a one month monitor to assess for atrial for fibrillation and other atrial arrhythmias or recurrent atrial flutter  We laid out the following options for his treatment 1  Continue Eliquis Cardizem in do nothing 2  Atrial flutter ablation plus loop recorder implant and if there is no atrial fibrillation documented for two months stop the Eliquis 3  If on the monitor we find atrial fibrillation and atrial flutter then we would do an ablation for symptoms only    I did explain to him the mechanism of atrial flutter in the heart that is a right atrial reentrant arrhythmia and that a single ablation carries approximately 90% success rate with approximately 1% risk of complication which can include bleeding bruising damage to blood vessels small risk of needing a pacemaker very small risk of heart perforation or stroke  At this point time we really need is a one month monitor to establish what his arrhythmia burden is  I am also going to do an exercise stress test to assess whether he has any arrhythmias with exercise and to whether he would be a candidate in the future for class one C antiarrhythmic agents    2  Hypertension adequately controlled      3  Anticoagulation chads two Vasc is one very reasonable to consider long-term anticoagulant however if he does not have atrial fibrillation could also consider atrial flutter ablation and anticoagulation based on the results of long-term cardiac monitoring    4  Prominent aortic impulse I am going to order an abdominal aortic ultrasound he is an ex-smoker    5   Hyperlipidemia tolerating atorvastatin    Elva Freeman it is going to follow up with my [de-identified] assistant who was present during today's appointment in approximately two months after we have gathered are data

## 2019-01-09 ENCOUNTER — HOSPITAL ENCOUNTER (OUTPATIENT)
Dept: ULTRASOUND IMAGING | Facility: HOSPITAL | Age: 59
Discharge: HOME | End: 2019-01-09
Attending: INTERNAL MEDICINE
Payer: COMMERCIAL

## 2019-01-09 ENCOUNTER — HOSPITAL ENCOUNTER (OUTPATIENT)
Dept: NON INVASIVE DIAGNOSTICS | Facility: HOSPITAL | Age: 59
Discharge: HOME | End: 2019-01-09
Attending: INTERNAL MEDICINE
Payer: COMMERCIAL

## 2019-01-09 ENCOUNTER — TELEPHONE (OUTPATIENT)
Dept: CARDIOLOGY CLINIC | Facility: CLINIC | Age: 59
End: 2019-01-09

## 2019-01-09 DIAGNOSIS — I48.92 ATRIAL FLUTTER, UNSPECIFIED TYPE (HCC): ICD-10-CM

## 2019-01-09 DIAGNOSIS — R00.2 HEART PALPITATIONS: ICD-10-CM

## 2019-01-09 DIAGNOSIS — R19.8 PULSATILE ABDOMEN: ICD-10-CM

## 2019-01-09 PROCEDURE — 93016 CV STRESS TEST SUPVJ ONLY: CPT | Performed by: INTERNAL MEDICINE

## 2019-01-09 PROCEDURE — 93018 CV STRESS TEST I&R ONLY: CPT | Performed by: INTERNAL MEDICINE

## 2019-01-09 PROCEDURE — 76775 US EXAM ABDO BACK WALL LIM: CPT

## 2019-01-09 PROCEDURE — 93017 CV STRESS TEST TRACING ONLY: CPT

## 2019-01-09 NOTE — TELEPHONE ENCOUNTER
Kandi called from [facility] and would like updated medication list   Please call her to let her know you faxed the list     Fax # [phone]

## 2019-01-11 LAB
CHEST PAIN STATEMENT: NORMAL
MAX DIASTOLIC BP: 80 MMHG
MAX HEART RATE: 151 BPM
MAX PREDICTED HEART RATE: 161 BPM
MAX. SYSTOLIC BP: 182 MMHG
PROTOCOL NAME: NORMAL
TARGET HR FORMULA: NORMAL
TEST INDICATION: NORMAL
TIME IN EXERCISE PHASE: NORMAL

## 2019-01-17 ENCOUNTER — TELEPHONE (OUTPATIENT)
Dept: CARDIOLOGY CLINIC | Facility: CLINIC | Age: 59
End: 2019-01-17

## 2019-01-17 NOTE — TELEPHONE ENCOUNTER
Pt called, stated he had many issues with the Biotel patch  It wasn't working and he was sent replacements, still didn't work  He just wanted to let us know he sent the device back to [facility]  He only wore it for one week

## 2019-01-23 DIAGNOSIS — E78.00 HYPERCHOLESTEROLEMIA: ICD-10-CM

## 2019-01-23 DIAGNOSIS — I48.92 ATRIAL FLUTTER, UNSPECIFIED TYPE (HCC): ICD-10-CM

## 2019-01-23 DIAGNOSIS — I10 ESSENTIAL HYPERTENSION: ICD-10-CM

## 2019-01-23 RX ORDER — LOSARTAN POTASSIUM 25 MG/1
25 TABLET ORAL DAILY
Qty: 90 TABLET | Refills: 3 | Status: SHIPPED | OUTPATIENT
Start: 2019-01-23 | End: 2019-01-23 | Stop reason: SDUPTHER

## 2019-01-23 RX ORDER — DILTIAZEM HYDROCHLORIDE 120 MG/1
120 CAPSULE, COATED, EXTENDED RELEASE ORAL DAILY
Qty: 90 CAPSULE | Refills: 3 | Status: SHIPPED | OUTPATIENT
Start: 2019-01-23 | End: 2019-01-23 | Stop reason: SDUPTHER

## 2019-01-23 RX ORDER — LOSARTAN POTASSIUM 25 MG/1
25 TABLET ORAL DAILY
Qty: 90 TABLET | Refills: 3 | Status: SHIPPED | OUTPATIENT
Start: 2019-01-23 | End: 2019-10-11 | Stop reason: SDUPTHER

## 2019-01-23 RX ORDER — DILTIAZEM HYDROCHLORIDE 120 MG/1
120 CAPSULE, COATED, EXTENDED RELEASE ORAL DAILY
Qty: 90 CAPSULE | Refills: 3 | Status: SHIPPED | OUTPATIENT
Start: 2019-01-23 | End: 2019-10-11 | Stop reason: SDUPTHER

## 2019-01-23 RX ORDER — ATORVASTATIN CALCIUM 10 MG/1
10 TABLET, FILM COATED ORAL DAILY
Qty: 90 TABLET | Refills: 3 | Status: SHIPPED | OUTPATIENT
Start: 2019-01-23 | End: 2019-10-11 | Stop reason: SDUPTHER

## 2019-03-19 NOTE — PROGRESS NOTES
2 Month Follow Up    Cherri Pearl  1960  [id number]  HEART & VASCULAR [facility]isell Cooks [id number]    Assessment/Plan     1  Atrial flutter, unspecified type ([address] )     2  Essential hypertension         ASSESSMENT:  1  Symptomatic paroxysmal atrial flutter  - new diagnosis of atrial flutter in December 2018  - anticoagulated with Eliquis (CHADS2 Vasc score of 1)  - rate control medication of Cardizem  mg daily  - BioTel - showed one episode of atrial flutter and no episodes of atrial fibrillation with just 1 week of monitoring  - exercise stress test on 1/9/2019 that was normal   2  Palpitations  3  Essential hypertension, controlled  - maintained on Cozaar 25 mg daily  4  Hyperlipidemia  - maintained on atorvastatin 10 mg daily    DISCUSSION/SUMMARY:  1  Atrial flutter - Patient was monitored for a short amount of time - just 1 week - and did have 1 episode of atrial flutter  We will continue him on Cardizem and Eliquis at this time  We will continue to follow him and monitor (as described below) and assess further down the road if his anticoagulation should be stopped  2  Further monitoring - Given the fact that he is not symptomatic and has shown no evidence of a high atrial flutter burden, we will hold off on further monitoring at this point  Taking his pulse a couple times a week to ensure that it is under 100 beats per minute was advised to the patient  Should he have a pulse higher than 100, was instructed to come to the office to get an EKG to see if he is in atrial flutter at that time  3  Follow up - Will follow-up with us in 3 months  History of Present Illness     HPI/INTERVAL HISTORY: Cherri Pearl is a 60 y o  male with a history of symptomatic paroxysmal atrial flutter on AC with Eliquis, palpitations, central hypertension, and hyperlipidemia      He was seen in consultation by Dr Carine Dodge on 01/04/2019, I was also present at this time in the room  We discussed his new diagnosis of atrial flutter with many different options in treating this arrhythmia  However, we first decided to assess his burden of arrhythmia by ordering a 1 month event monitor and stress test as well  An echo had been already completed on 01/02/2019, showing an EF of 55%  On his event monitor, there was only one episode of atrial flutter noted  This was noted to be on the first day that the monitor was placed  Patient reports having no symptoms at the time of the episode and been recorded  However, he did say that he was only able to wear the monitor for 1 week given technical difficulties with the patch itself  He also had his exercise stress test done which was negative, only having occasional PVCs during the test     Symptom-wise, he states that he has been feeling fine  He does state that he feels shortness of breath after climbing a flight of stairs and though he is more weak his upper body strength recently  However he denies any chest pain, palpitations, lightheadedness, swelling in the legs or dizziness  Of other note, he did seem to have a prominent aortic pulse and abdominal aorta ultrasound was ordered  Aortoiliac atherosclerotic disease was noted and no evidence of aneurysm  Review of Systems   Positive for shortness of breath with steps  Negative for chest pain, palpitations, edema, or dizziness  ROS as noted above, otherwise 12 point review of systems was performed and is negative       Historical Information   Social History     Socioeconomic History    Marital status: Single     Spouse name: Not on file    Number of children: Not on file    Years of education: Not on file    Highest education level: Not on file   Occupational History    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, dispatch     Employer: [employer]   Social Needs    Financial resource strain: Not on file    Food insecurity:     Worry: Not on file Inability: Not on file    Transportation needs:     Medical: Not on file     Non-medical: Not on file   Tobacco Use    Smoking status: Former Smoker     Last attempt to quit: 2014     Years since quittin 2    Smokeless tobacco: Never Used   Substance and Sexual Activity    Alcohol use:  Yes     Alcohol/week: 4 2 oz     Types: 7 Glasses of wine per week     Comment: Social drinker    Drug use: No    Sexual activity: Not on file   Lifestyle    Physical activity:     Days per week: Not on file     Minutes per session: Not on file    Stress: Not on file   Relationships    Social connections:     Talks on phone: Not on file     Gets together: Not on file     Attends Taoist service: Not on file     Active member of club or organization: Not on file     Attends meetings of clubs or organizations: Not on file     Relationship status: Not on file    Intimate partner violence:     Fear of current or ex partner: Not on file     Emotionally abused: Not on file     Physically abused: Not on file     Forced sexual activity: Not on file   Other Topics Concern    Not on file   Social History Narrative    Recreational activities:  Walking     Past Medical History:   Diagnosis Date    Hyperlipidemia     Hypertension      Past Surgical History:   Procedure Laterality Date    CHOLECYSTECTOMY      COLONOSCOPY      x2 - last was  (neg)    NOSE SURGERY      See ENT note 2016     Social History     Substance and Sexual Activity   Alcohol Use Yes    Alcohol/week: 4 2 oz    Types: 7 Glasses of wine per week    Comment: Social drinker     Social History     Substance and Sexual Activity   Drug Use No     Social History     Tobacco Use   Smoking Status Former Smoker    Last attempt to quit: 2014    Years since quittin 2   Smokeless Tobacco Never Used     Family History   Problem Relation Age of Onset    Other Mother         Sarcoma    Hypertension Father        Meds/Allergies       Current Outpatient Medications:     apixaban (ELIQUIS) 5 mg, Take 1 tablet (5 mg total) by mouth 2 (two) times a day, Disp: 180 tablet, Rfl: 3    atorvastatin (LIPITOR) 10 mg tablet, Take 1 tablet (10 mg total) by mouth daily, Disp: 90 tablet, Rfl: 3    diltiazem (CARDIZEM CD) 120 mg 24 hr capsule, Take 1 capsule (120 mg total) by mouth daily, Disp: 90 capsule, Rfl: 3    EPINEPHrine (EPIPEN) 0 3 mg/0 3 mL SOAJ, Inject as directed, Disp: , Rfl:     losartan (COZAAR) 25 mg tablet, Take 1 tablet (25 mg total) by mouth daily, Disp: 90 tablet, Rfl: 3    Allergies   Allergen Reactions    Dust Mite Extract     Lisinopril Edema     Annotation - [id number]: lip swelling x 2 2015    Short Ragweed Pollen Ext        Objective   Vitals: Blood pressure 136/82, pulse 61, height 6' 1" (1 854 m), weight 92 8 kg (204 lb 8 oz)  Physical Exam   Constitutional: He is oriented to person, place, and time  He appears well-developed and well-nourished  No distress  HENT:   Head: Normocephalic and atraumatic  Eyes: Pupils are equal, round, and reactive to light  EOM are normal    Neck: Normal range of motion  No JVD present  Cardiovascular: Normal rate, regular rhythm and normal heart sounds  Exam reveals no friction rub  No murmur heard  Pulmonary/Chest: Effort normal and breath sounds normal  No respiratory distress  He has no rales  Abdominal: Soft  Neurological: He is alert and oriented to person, place, and time  Skin: Skin is warm and dry  He is not diaphoretic  Labs:  No visits with results within 2 Month(s) from this visit  Latest known visit with results is:   Hospital Outpatient Visit on 01/09/2019   Component Date Value    Protocol Name 01/09/2019 Sobia Landis Time In Exercise Phase 01/09/2019 00:09:00     MAX   SYSTOLIC BP 19/12/3376 049     Max Diastolic Bp 77/06/9670 80     Max Heart Rate 01/09/2019 151     Max Predicted Heart Rate 01/09/2019 161     Reason for Termination 01/09/2019 Value:Fatigue  Dyspnea  Target Heart Rate Achieved      Test Indication 2019 HX A FLUTTER,     Target Hr Formular 2019 (220 - Age)*85%     Arrhy During Ex 2019                      Value:atrial premature beats  ventricular premature beats      Chest Pain Statement 2019 none        Imaging: I have personally reviewed pertinent reports  ECHO:   Results for orders placed during the hospital encounter of 19   Echo complete with contrast if indicated    Narrative [address]  [facility], [address] [facility]  [phone]    Transthoracic Echocardiogram  2D, M-mode, Doppler, and Color Doppler    Study date:  2019    Patient: Jonathan Sidhu  MR number: [id number]  Account number: [de-identified]  : 1960  Age: 60 years  Gender: Male  Status: Outpatient  Location: [address] [place] [address]  Height: 73 in  Weight: 202 lb  BP: 139/ 79 mmHg    Indications: Atrial flutter    Diagnoses: I48 1 - Atrial flutter    Sonographer:  LAZARO Ba  Interpreting Physician:  Isaiah Pierson DO  Primary Physician:  Clare Babcock DO  Referring Physician:  Darryll Mcardle, DO  Group:  [id number]  cc:  Arlene Dunbar DO  Cardiology Fellow:  Mary Vásquez MD    SUMMARY    LEFT VENTRICLE:  Systolic function was normal  Ejection fraction was estimated to be 55 %  There were no regional wall motion abnormalities  RIGHT VENTRICLE:  The size was normal   Systolic function was normal     HISTORY: PRIOR HISTORY: Hypertension    PROCEDURE: The study was performed in the [address]  This was a routine study  The transthoracic approach was used  The study included complete 2D imaging, M-mode, complete spectral Doppler, and color Doppler  The  heart rate was 68 bpm, at the start of the study  Images were obtained from the parasternal, apical, subcostal, and suprasternal notch acoustic windows  Echocardiographic views were limited due to poor acoustic window availability  Image  quality was adequate  LEFT VENTRICLE: Size was normal  Systolic function was normal  Ejection fraction was estimated to be 55 %  There were no regional wall motion abnormalities  Wall thickness was normal  DOPPLER: Left ventricular diastolic function parameters  were normal for the patient's age  RIGHT VENTRICLE: The size was normal  Systolic function was normal  Wall thickness was normal     LEFT ATRIUM: Size was normal     RIGHT ATRIUM: Size was normal     MITRAL VALVE: Valve structure was normal  There was normal leaflet separation  DOPPLER: The transmitral velocity was within the normal range  There was no evidence for stenosis  There was trace regurgitation  AORTIC VALVE: The valve was trileaflet  Leaflets exhibited mildly increased thickness, mild calcification, and normal cuspal separation  DOPPLER: Transaortic velocity was within the normal range  There was no evidence for stenosis  There  was no significant regurgitation  TRICUSPID VALVE: The valve structure was normal  There was normal leaflet separation  DOPPLER: The transtricuspid velocity was within the normal range  There was no evidence for stenosis  There was no significant regurgitation  PULMONIC VALVE: Leaflets exhibited normal thickness, no calcification, and normal cuspal separation  DOPPLER: The transpulmonic velocity was within the normal range  There was no significant regurgitation  PERICARDIUM: There was no pericardial effusion  AORTA: The root exhibited normal size  SYSTEMIC VEINS: IVC: The inferior vena cava was normal in size      SYSTEM MEASUREMENT TABLES    2D  %FS: 30 2 %  Ao Diam: 3 29 cm  EDV(Teich): 120 65 ml  EF(Cube): 65 99 %  EF(Teich): 57 24 %  ESV(Cube): 43 64 ml  ESV(Teich): 51 59 ml  IVSd: 0 88 cm  LA Area: 17 26 cm2  LA Diam: 3 48 cm  LVEDV MOD A4C: 109 23 ml  LVEF MOD A4C: 49 2 %  LVESV MOD A4C: 55 5 ml  LVIDd: 5 04 cm  LVIDs: 3 52 cm  LVLd A4C: 7 81 cm  LVLs A4C: 6 85 cm  LVPWd: 0 97 cm  RA Area: 13 92 cm2  RV Diam : 3 75 cm  SV MOD A4C: 53 74 ml  SV(Cube): 84 66 ml  SV(Teich): 69 06 ml    CW  TR Vmax: 2 18 m/s  TR maxP 08 mmHg    MM  TAPSE: 1 98 cm    PW  E': 0 06 m/s  E/E': 8 6  MV A Moses: 0 51 m/s  MV Dec Atkinson: 1 8 m/s2  MV DecT: 281 82 ms  MV E Moses: 0 51 m/s  MV E/A Ratio: 1    [facility] Accredited Echocardiography Laboratory    Prepared and electronically signed by    Inga Meeks DO  Signed 2019 10:03:56         TREADMILL STRESS TEST:   STRESS SUMMARY: Duration of exercise was 9 min  The patient exercised to protocol stage 3  Maximal work rate was 10 1 METs  Maximal heart rate during stress was 151 bpm ( 94 % of maximal predicted heart rate)  The heart rate response to  stress was normal  There was resting hypertension with an appropriate blood pressure response to stress  The rate-pressure product for the peak heart rate and blood pressure was 42296  There was no chest pain during stress  The stress test  was terminated due to mild dyspnea and fatigue  The stress ECG was negative for ischemia  There were upsloping ST depressions that did not meet criteria for a positive test  Arrhythmia during stress: isolated atrial premature beats and  isolated premature ventricular beats      SUMMARY:  -  Stress results: Duration of exercise was 9 min  Target heart rate was achieved  There was resting hypertension with an appropriate blood pressure response to stress  There was no chest pain during stress  -  ECG conclusions: The stress ECG was negative for ischemia  There were upsloping ST depressions that did not meet criteria for a positive test      IMPRESSIONS: Normal study after maximal exercise  ECHO (2019):  HISTORY: PRIOR HISTORY: Hypertension     PROCEDURE: The study was performed in the [address] [facility] [place]  This was a routine study  The transthoracic approach was used   The study included complete 2D imaging, M-mode, complete spectral Doppler, and color Doppler  The  heart rate was 68 bpm, at the start of the study  Images were obtained from the parasternal, apical, subcostal, and suprasternal notch acoustic windows  Echocardiographic views were limited due to poor acoustic window availability  Image  quality was adequate      LEFT VENTRICLE: Size was normal  Systolic function was normal  Ejection fraction was estimated to be 55 %  There were no regional wall motion abnormalities  Wall thickness was normal  DOPPLER: Left ventricular diastolic function parameters  were normal for the patient's age      RIGHT VENTRICLE: The size was normal  Systolic function was normal  Wall thickness was normal      LEFT ATRIUM: Size was normal      RIGHT ATRIUM: Size was normal      MITRAL VALVE: Valve structure was normal  There was normal leaflet separation  DOPPLER: The transmitral velocity was within the normal range  There was no evidence for stenosis  There was trace regurgitation      AORTIC VALVE: The valve was trileaflet  Leaflets exhibited mildly increased thickness, mild calcification, and normal cuspal separation  DOPPLER: Transaortic velocity was within the normal range  There was no evidence for stenosis  There  was no significant regurgitation      TRICUSPID VALVE: The valve structure was normal  There was normal leaflet separation  DOPPLER: The transtricuspid velocity was within the normal range  There was no evidence for stenosis  There was no significant regurgitation      PULMONIC VALVE: Leaflets exhibited normal thickness, no calcification, and normal cuspal separation  DOPPLER: The transpulmonic velocity was within the normal range  There was no significant regurgitation      PERICARDIUM: There was no pericardial effusion      AORTA: The root exhibited normal size      SYSTEMIC VEINS: IVC: The inferior vena cava was normal in size      EKG: normal sinus rhythm with RBBB with heart rate of 61bpm    PRIOR EKG OF FLUTTER:      ABDOMINAL AORTIC ULTRASOUND (01/09/2019):  COMPARISON: None     FINDINGS:      Ultrasound of the abdominal aorta was performed in longitudinal and transverse planes with a curvilinear transducer      Proximal aorta:  2 3 cm  Mid aorta:    1 8 cm  Distal aorta:    1 7 cm  Right common iliac origin:  0 9 cm  Left common iliac origin:  1 cm     No periaortic collections or adenopathy detected  Aortoiliac atherosclerotic disease is noted      IMPRESSION:     Aortoiliac atherosclerotic disease without evidence for abdominal aortic aneurysm

## 2019-03-20 ENCOUNTER — OFFICE VISIT (OUTPATIENT)
Dept: CARDIOLOGY CLINIC | Facility: CLINIC | Age: 59
End: 2019-03-20
Payer: COMMERCIAL

## 2019-03-20 VITALS
HEIGHT: 73 IN | HEART RATE: 61 BPM | SYSTOLIC BLOOD PRESSURE: 136 MMHG | DIASTOLIC BLOOD PRESSURE: 82 MMHG | WEIGHT: 204.5 LBS | BODY MASS INDEX: 27.1 KG/M2

## 2019-03-20 DIAGNOSIS — I48.92 ATRIAL FLUTTER, UNSPECIFIED TYPE (HCC): Primary | ICD-10-CM

## 2019-03-20 DIAGNOSIS — I10 ESSENTIAL HYPERTENSION: ICD-10-CM

## 2019-03-20 PROCEDURE — 93000 ELECTROCARDIOGRAM COMPLETE: CPT | Performed by: PHYSICIAN ASSISTANT

## 2019-03-20 PROCEDURE — 99214 OFFICE O/P EST MOD 30 MIN: CPT | Performed by: PHYSICIAN ASSISTANT

## 2019-03-25 ENCOUNTER — APPOINTMENT (EMERGENCY)
Dept: CT IMAGING | Facility: HOSPITAL | Age: 59
End: 2019-03-25
Payer: COMMERCIAL

## 2019-03-25 ENCOUNTER — HOSPITAL ENCOUNTER (EMERGENCY)
Facility: HOSPITAL | Age: 59
Discharge: HOME | End: 2019-03-26
Attending: EMERGENCY MEDICINE | Admitting: EMERGENCY MEDICINE
Payer: COMMERCIAL

## 2019-03-25 VITALS
BODY MASS INDEX: 27.37 KG/M2 | DIASTOLIC BLOOD PRESSURE: 72 MMHG | RESPIRATION RATE: 20 BRPM | HEART RATE: 81 BPM | WEIGHT: 207.45 LBS | TEMPERATURE: 99 F | SYSTOLIC BLOOD PRESSURE: 139 MMHG | OXYGEN SATURATION: 97 %

## 2019-03-25 DIAGNOSIS — M54.9 BACK PAIN: Primary | ICD-10-CM

## 2019-03-25 DIAGNOSIS — R51.9 HEADACHE: ICD-10-CM

## 2019-03-25 LAB
ANION GAP SERPL CALCULATED.3IONS-SCNC: 9 MMOL/L (ref 4–13)
APTT PPP: 38 SECONDS (ref 26–38)
BASOPHILS # BLD AUTO: 0.04 THOUSANDS/ΜL (ref 0–0.1)
BASOPHILS NFR BLD AUTO: 0 % (ref 0–1)
BUN SERPL-MCNC: 17 MG/DL (ref 5–25)
CALCIUM SERPL-MCNC: 8.9 MG/DL (ref 8.3–10.1)
CHLORIDE SERPL-SCNC: 104 MMOL/L (ref 100–108)
CK MB SERPL-MCNC: 1 % (ref 0–2.5)
CK MB SERPL-MCNC: 2.1 NG/ML (ref 0–5)
CK SERPL-CCNC: 206 U/L (ref 39–308)
CO2 SERPL-SCNC: 27 MMOL/L (ref 21–32)
CREAT SERPL-MCNC: 1.2 MG/DL (ref 0.6–1.3)
EOSINOPHIL # BLD AUTO: 0.1 THOUSAND/ΜL (ref 0–0.61)
EOSINOPHIL NFR BLD AUTO: 1 % (ref 0–6)
ERYTHROCYTE [DISTWIDTH] IN BLOOD BY AUTOMATED COUNT: 12.9 % (ref 11.6–15.1)
GFR SERPL CREATININE-BSD FRML MDRD: 66 ML/MIN/1.73SQ M
GLUCOSE SERPL-MCNC: 94 MG/DL (ref 65–140)
HCT VFR BLD AUTO: 43.1 % (ref 36.5–49.3)
HGB BLD-MCNC: 15 G/DL (ref 12–17)
IMM GRANULOCYTES # BLD AUTO: 0.03 THOUSAND/UL (ref 0–0.2)
IMM GRANULOCYTES NFR BLD AUTO: 0 % (ref 0–2)
INR PPP: 1.2 (ref 0.86–1.17)
LYMPHOCYTES # BLD AUTO: 2.83 THOUSANDS/ΜL (ref 0.6–4.47)
LYMPHOCYTES NFR BLD AUTO: 30 % (ref 14–44)
MAGNESIUM SERPL-MCNC: 1.9 MG/DL (ref 1.6–2.6)
MCH RBC QN AUTO: 33.1 PG (ref 26.8–34.3)
MCHC RBC AUTO-ENTMCNC: 34.8 G/DL (ref 31.4–37.4)
MCV RBC AUTO: 95 FL (ref 82–98)
MONOCYTES # BLD AUTO: 0.62 THOUSAND/ΜL (ref 0.17–1.22)
MONOCYTES NFR BLD AUTO: 7 % (ref 4–12)
NEUTROPHILS # BLD AUTO: 5.75 THOUSANDS/ΜL (ref 1.85–7.62)
NEUTS SEG NFR BLD AUTO: 62 % (ref 43–75)
NRBC BLD AUTO-RTO: 0 /100 WBCS
PLATELET # BLD AUTO: 233 THOUSANDS/UL (ref 149–390)
PMV BLD AUTO: 9.9 FL (ref 8.9–12.7)
POTASSIUM SERPL-SCNC: 4 MMOL/L (ref 3.5–5.3)
PROTHROMBIN TIME: 15.3 SECONDS (ref 11.8–14.2)
RBC # BLD AUTO: 4.53 MILLION/UL (ref 3.88–5.62)
SODIUM SERPL-SCNC: 140 MMOL/L (ref 136–145)
WBC # BLD AUTO: 9.37 THOUSAND/UL (ref 4.31–10.16)

## 2019-03-25 PROCEDURE — 36415 COLL VENOUS BLD VENIPUNCTURE: CPT | Performed by: EMERGENCY MEDICINE

## 2019-03-25 PROCEDURE — 96361 HYDRATE IV INFUSION ADD-ON: CPT

## 2019-03-25 PROCEDURE — 93005 ELECTROCARDIOGRAM TRACING: CPT

## 2019-03-25 PROCEDURE — 70450 CT HEAD/BRAIN W/O DYE: CPT

## 2019-03-25 PROCEDURE — 85730 THROMBOPLASTIN TIME PARTIAL: CPT | Performed by: EMERGENCY MEDICINE

## 2019-03-25 PROCEDURE — 96365 THER/PROPH/DIAG IV INF INIT: CPT

## 2019-03-25 PROCEDURE — 82553 CREATINE MB FRACTION: CPT | Performed by: EMERGENCY MEDICINE

## 2019-03-25 PROCEDURE — 83735 ASSAY OF MAGNESIUM: CPT | Performed by: EMERGENCY MEDICINE

## 2019-03-25 PROCEDURE — 99284 EMERGENCY DEPT VISIT MOD MDM: CPT

## 2019-03-25 PROCEDURE — 82550 ASSAY OF CK (CPK): CPT | Performed by: EMERGENCY MEDICINE

## 2019-03-25 PROCEDURE — 85025 COMPLETE CBC W/AUTO DIFF WBC: CPT | Performed by: EMERGENCY MEDICINE

## 2019-03-25 PROCEDURE — 85610 PROTHROMBIN TIME: CPT | Performed by: EMERGENCY MEDICINE

## 2019-03-25 PROCEDURE — 96375 TX/PRO/DX INJ NEW DRUG ADDON: CPT

## 2019-03-25 PROCEDURE — 85652 RBC SED RATE AUTOMATED: CPT | Performed by: EMERGENCY MEDICINE

## 2019-03-25 PROCEDURE — 80048 BASIC METABOLIC PNL TOTAL CA: CPT | Performed by: EMERGENCY MEDICINE

## 2019-03-25 PROCEDURE — 86140 C-REACTIVE PROTEIN: CPT | Performed by: EMERGENCY MEDICINE

## 2019-03-25 RX ORDER — METOCLOPRAMIDE HYDROCHLORIDE 5 MG/ML
10 INJECTION INTRAMUSCULAR; INTRAVENOUS ONCE
Status: COMPLETED | OUTPATIENT
Start: 2019-03-25 | End: 2019-03-25

## 2019-03-25 RX ORDER — DIPHENHYDRAMINE HYDROCHLORIDE 50 MG/ML
25 INJECTION INTRAMUSCULAR; INTRAVENOUS ONCE
Status: COMPLETED | OUTPATIENT
Start: 2019-03-25 | End: 2019-03-25

## 2019-03-25 RX ORDER — MAGNESIUM SULFATE HEPTAHYDRATE 40 MG/ML
2 INJECTION, SOLUTION INTRAVENOUS ONCE
Status: COMPLETED | OUTPATIENT
Start: 2019-03-25 | End: 2019-03-26

## 2019-03-25 RX ORDER — KETOROLAC TROMETHAMINE 30 MG/ML
15 INJECTION, SOLUTION INTRAMUSCULAR; INTRAVENOUS ONCE
Status: COMPLETED | OUTPATIENT
Start: 2019-03-25 | End: 2019-03-25

## 2019-03-25 RX ADMIN — MAGNESIUM SULFATE HEPTAHYDRATE 2 G: 40 INJECTION, SOLUTION INTRAVENOUS at 23:35

## 2019-03-25 RX ADMIN — METOCLOPRAMIDE 10 MG: 5 INJECTION, SOLUTION INTRAMUSCULAR; INTRAVENOUS at 23:09

## 2019-03-25 RX ADMIN — SODIUM CHLORIDE 1000 ML: 0.9 INJECTION, SOLUTION INTRAVENOUS at 23:04

## 2019-03-25 RX ADMIN — DIPHENHYDRAMINE HYDROCHLORIDE 25 MG: 50 INJECTION, SOLUTION INTRAMUSCULAR; INTRAVENOUS at 23:06

## 2019-03-25 RX ADMIN — KETOROLAC TROMETHAMINE 15 MG: 30 INJECTION, SOLUTION INTRAMUSCULAR; INTRAVENOUS at 23:05

## 2019-03-26 LAB
BACTERIA UR QL AUTO: ABNORMAL /HPF
BILIRUB UR QL STRIP: NEGATIVE
CLARITY UR: CLEAR
COLOR UR: ABNORMAL
CRP SERPL QL: <3 MG/L
ERYTHROCYTE [SEDIMENTATION RATE] IN BLOOD: 6 MM/HOUR (ref 0–10)
FLUAV AG SPEC QL: NOT DETECTED
FLUBV AG SPEC QL: NOT DETECTED
GLUCOSE UR STRIP-MCNC: NEGATIVE MG/DL
HGB UR QL STRIP.AUTO: ABNORMAL
KETONES UR STRIP-MCNC: NEGATIVE MG/DL
LEUKOCYTE ESTERASE UR QL STRIP: NEGATIVE
NITRITE UR QL STRIP: NEGATIVE
NON-SQ EPI CELLS URNS QL MICRO: ABNORMAL /HPF
PH UR STRIP.AUTO: 5.5 [PH] (ref 4.5–8)
PROT UR STRIP-MCNC: NEGATIVE MG/DL
RBC #/AREA URNS AUTO: ABNORMAL /HPF
RSV B RNA SPEC QL NAA+PROBE: NOT DETECTED
SP GR UR STRIP.AUTO: 1.02 (ref 1–1.03)
UROBILINOGEN UR QL STRIP.AUTO: 0.2 E.U./DL
WBC #/AREA URNS AUTO: ABNORMAL /HPF

## 2019-03-26 PROCEDURE — 81001 URINALYSIS AUTO W/SCOPE: CPT

## 2019-03-26 PROCEDURE — 87631 RESP VIRUS 3-5 TARGETS: CPT | Performed by: EMERGENCY MEDICINE

## 2019-03-26 NOTE — ED PROVIDER NOTES
History  Chief Complaint   Patient presents with    Back Pain     Pt reports back pain, headache, nausea, and chills that started today  Pt denies injuries   Headache       History provided by:  Patient and significant other   used: No    Medical Problem - Major   Location:  Pain that started in the lower back that radiates down to the buttocks and also neck pain and headache with some joint pain, nausea and chills  Severity:  Severe  Onset quality:  Sudden  Duration:  1 day  Timing:  Constant  Progression:  Unchanged  Chronicity:  New  Relieved by:  Nothing  Worsened by: Movement  Ineffective treatments:  None tried  Associated symptoms: congestion, headaches and nausea    Associated symptoms: no abdominal pain, no chest pain, no cough, no diarrhea, no fever, no shortness of breath and no vomiting     Patient states the same presentation as happen to him in the past multiple times  The last time was in December where he had a lot of tests done and was then sent home  At that time he had a CT of his head which was negative and a CT which noted that his lumbar spine appeared normal   He starts with lower back pain that radiates down into the buttocks  No bowel or bladder complaints although he notes that he has had some increased urinary frequency but no dysuria  He has chills but no fever  No body aches  He then had a neck pain and headache without weakness or numbness  No chest pain or shortness of breath  He has a history of atrial fibrillation is anticoagulated  No trauma  No palpitations  Prior to Admission Medications   Prescriptions Last Dose Informant Patient Reported? Taking?    EPINEPHrine (EPIPEN) 0 3 mg/0 3 mL SOAJ  Self Yes No   Sig: Inject as directed   apixaban (ELIQUIS) 5 mg  Self No No   Sig: Take 1 tablet (5 mg total) by mouth 2 (two) times a day   atorvastatin (LIPITOR) 10 mg tablet  Self No No   Sig: Take 1 tablet (10 mg total) by mouth daily   diltiazem (CARDIZEM CD) 120 mg 24 hr capsule  Self No No   Sig: Take 1 capsule (120 mg total) by mouth daily   losartan (COZAAR) 25 mg tablet  Self No No   Sig: Take 1 tablet (25 mg total) by mouth daily      Facility-Administered Medications: None       Past Medical History:   Diagnosis Date    A-fib ([facility] [address] )     Hyperlipidemia     Hypertension        Past Surgical History:   Procedure Laterality Date    CHOLECYSTECTOMY      COLONOSCOPY      x2 - last was  (neg)    NOSE SURGERY      See ENT note 2016       Family History   Problem Relation Age of Onset    Other Mother         Sarcoma    Hypertension Father      I have reviewed and agree with the history as documented  Social History     Tobacco Use    Smoking status: Former Smoker     Last attempt to quit: 2014     Years since quittin 3    Smokeless tobacco: Never Used   Substance Use Topics    Alcohol use: Yes     Alcohol/week: 4 2 oz     Types: 7 Glasses of wine per week     Comment: Social drinker    Drug use: No        Review of Systems   Constitutional: Positive for chills  Negative for fever  HENT: Positive for congestion  Respiratory: Negative for cough, chest tightness and shortness of breath  Cardiovascular: Negative for chest pain and palpitations  Gastrointestinal: Positive for nausea  Negative for abdominal pain, diarrhea and vomiting  Genitourinary: Positive for frequency  Negative for difficulty urinating  Musculoskeletal: Positive for back pain and neck pain  Neurological: Positive for headaches  Negative for dizziness, facial asymmetry, speech difficulty, weakness and numbness  Physical Exam  Physical Exam   Constitutional: He appears well-developed and well-nourished  He is cooperative  Non-toxic appearance  He does not have a sickly appearance  He does not appear ill  No distress  HENT:   Head: Normocephalic and atraumatic  Right Ear: Hearing normal  No drainage or swelling     Left Ear: Hearing normal  No drainage or swelling  Mouth/Throat: Mucous membranes are normal    Eyes: Pupils are equal, round, and reactive to light  Conjunctivae, EOM and lids are normal  Right eye exhibits no discharge  Left eye exhibits no discharge  Neck: Trachea normal and normal range of motion  Neck supple  No JVD present  Cardiovascular: Normal rate, regular rhythm, normal heart sounds, intact distal pulses and normal pulses  Exam reveals no gallop and no friction rub  No murmur heard  Pulmonary/Chest: Effort normal and breath sounds normal  No stridor  No respiratory distress  He has no wheezes  He has no rales  He exhibits no tenderness  Abdominal: Soft  Normal appearance  He exhibits no ascites and no mass  There is no hepatosplenomegaly  There is no tenderness  There is no rebound, no guarding and no CVA tenderness  Musculoskeletal: Normal range of motion  He exhibits no edema, tenderness or deformity  Lymphadenopathy:     He has no cervical adenopathy  Right: No inguinal adenopathy present  Left: No inguinal adenopathy present  Neurological: He is alert  He has normal strength and normal reflexes  He displays normal reflexes  No cranial nerve deficit or sensory deficit  He exhibits normal muscle tone  Coordination normal  GCS eye subscore is 4  GCS verbal subscore is 5  GCS motor subscore is 6  Negative straight leg raise   Skin: Skin is warm, dry and intact  No rash noted  He is not diaphoretic  No pallor  Psychiatric: He has a normal mood and affect  His speech is normal  Cognition and memory are normal    Nursing note and vitals reviewed        Vital Signs  ED Triage Vitals   Temperature Pulse Respirations Blood Pressure SpO2   03/25/19 2148 03/25/19 2148 03/25/19 2148 03/25/19 2148 03/25/19 2148   99 °F (37 2 °C) 81 20 (!) 181/114 99 %      Temp src Heart Rate Source Patient Position - Orthostatic VS BP Location FiO2 (%)   -- 03/25/19 2148 -- 03/25/19 2148 --    Monitor  Right arm       Pain Score       03/25/19 2305       Worst Possible Pain           Vitals:    03/25/19 2148 03/25/19 2338   BP: (!) 181/114 139/72   Pulse: 81 81         Visual Acuity      ED Medications  Medications   ketorolac (TORADOL) injection 15 mg (15 mg Intravenous Given 3/25/19 2305)   metoclopramide (REGLAN) injection 10 mg (10 mg Intravenous Given 3/25/19 2309)   diphenhydrAMINE (BENADRYL) injection 25 mg (25 mg Intravenous Given 3/25/19 2306)   magnesium sulfate 2 g/50 mL IVPB (premix) 2 g (0 g Intravenous Stopped 3/26/19 0016)   sodium chloride 0 9 % bolus 1,000 mL (0 mL Intravenous Stopped 3/26/19 0017)       Diagnostic Studies  Results Reviewed     Procedure Component Value Units Date/Time    Influenza A/B and RSV by PCR [[id number]] Collected:  03/26/19 0020    Lab Status:  No result Specimen:  Nasopharyngeal Swab     C-reactive protein [[id number]]  (Normal) Collected:  03/25/19 2304    Lab Status:  Final result Specimen:  Blood from Arm, Right Updated:  03/26/19 0016     CRP <3 0 mg/L     Urine Microscopic [[id number]] Collected:  03/26/19 0010    Lab Status:   In process Specimen:  Urine, Clean Catch Updated:  03/26/19 0010    POCT urinalysis dipstick [[id number]]  (Abnormal) Resulted:  03/26/19 0008    Lab Status:  Final result Specimen:  Urine Updated:  03/26/19 0008    ED Urine Macroscopic [[id number]]  (Abnormal) Collected:  03/26/19 0010    Lab Status:  Final result Specimen:  Urine Updated:  03/26/19 0007     Color, UA Irma     Clarity, UA Clear     pH, UA 5 5     Leukocytes, UA Negative     Nitrite, UA Negative     Protein, UA Negative mg/dl      Glucose, UA Negative mg/dl      Ketones, UA Negative mg/dl      Urobilinogen, UA 0 2 E U /dl      Bilirubin, UA Negative     Blood, UA Moderate     Specific [place], UA 1 020    Narrative:       CLINITEK RESULT    Basic metabolic panel [[id number]] Collected:  03/25/19 2304    Lab Status:  Final result Specimen:  Blood from Arm, Right Updated:  03/25/19 2351     Sodium 140 mmol/L      Potassium 4 0 mmol/L      Chloride 104 mmol/L      CO2 27 mmol/L      ANION GAP 9 mmol/L      BUN 17 mg/dL      Creatinine 1 20 mg/dL      Glucose 94 mg/dL      Calcium 8 9 mg/dL      eGFR 66 ml/min/1 73sq m     Narrative:       National Kidney Disease Education Program recommendations are as follows:  GFR calculation is accurate only with a steady state creatinine  Chronic Kidney disease less than 60 ml/min/1 73 sq  meters  Kidney failure less than 15 ml/min/1 73 sq  meters      Magnesium [[id number]]  (Normal) Collected:  03/25/19 2304    Lab Status:  Final result Specimen:  Blood from Arm, Right Updated:  03/25/19 2351     Magnesium 1 9 mg/dL     CKMB [[id number]]  (Normal) Collected:  03/25/19 2304    Lab Status:  Final result Specimen:  Blood from Arm, Right Updated:  03/25/19 2351     CK-MB Index 1 0 %      CK-MB 2 1 ng/mL     CK Total with Reflex CKMB [[id number]]  (Normal) Collected:  03/25/19 2304    Lab Status:  Final result Specimen:  Blood from Arm, Right Updated:  03/25/19 2351     Total  U/L     APTT [[id number]]  (Normal) Collected:  03/25/19 2304    Lab Status:  Final result Specimen:  Blood from Arm, Right Updated:  03/25/19 2327     PTT 38 seconds     Protime-INR [[id number]]  (Abnormal) Collected:  03/25/19 2304    Lab Status:  Final result Specimen:  Blood from Arm, Right Updated:  03/25/19 2327     Protime 15 3 seconds      INR 1 20    CBC and differential [[id number]] Collected:  03/25/19 2304    Lab Status:  Final result Specimen:  Blood from Arm, Right Updated:  03/25/19 2313     WBC 9 37 Thousand/uL      RBC 4 53 Million/uL      Hemoglobin 15 0 g/dL      Hematocrit 43 1 %      MCV 95 fL      MCH 33 1 pg      MCHC 34 8 g/dL      RDW 12 9 %      MPV 9 9 fL      Platelets 680 Thousands/uL      nRBC 0 /100 WBCs      Neutrophils Relative 62 %      Immat GRANS % 0 %      Lymphocytes Relative 30 %      Monocytes Relative 7 %      Eosinophils Relative 1 %      Basophils Relative 0 % Neutrophils Absolute 5 75 Thousands/µL      Immature Grans Absolute 0 03 Thousand/uL      Lymphocytes Absolute 2 83 Thousands/µL      Monocytes Absolute 0 62 Thousand/µL      Eosinophils Absolute 0 10 Thousand/µL      Basophils Absolute 0 04 Thousands/µL     Sedimentation rate, automated [[id number]] Collected:  03/25/19 2304    Lab Status: In process Specimen:  Blood from Arm, Right Updated:  03/25/19 2310                 CT head without contrast   Final Result by Paula Franco MD (03/26 0001)         1  No acute intracranial abnormality  2   Chronic paranasal sinus disease  Workstation performed: [id number]                    Procedures  ECG 12 Lead Documentation  Date/Time: 3/26/2019 12:20 AM  Performed by: Landen Madrigal MD  Authorized by: Landen Madrigal MD     ECG reviewed by me, the ED Provider: yes    Patient location:  ED  Interpretation:     Interpretation: normal    Rate:     ECG rate:  80    ECG rate assessment: normal    Rhythm:     Rhythm: sinus rhythm    Ectopy:     Ectopy: none    QRS:     QRS axis:  Normal    QRS intervals:  Normal  Conduction:     Conduction: normal    ST segments:     ST segments:  Normal  T waves:     T waves: normal             Phone Contacts  ED Phone Contact    ED Course                               MDM  Number of Diagnoses or Management Options  Back pain:   Headache:   Diagnosis management comments: Patient much better with medications  Low-grade fever here  Flu test pending  He did receive a flu shot  Vital signs otherwise stable  Normal neurological and vascular exam   Negative straight leg raise  Normal rectal exam, sensation and tone  Has chronic sinus disease noted on CT scan no bleeding  Inflammatory markers negative  Unclear etiology of symptoms however he has had multiple presentations same way in the past he can follow up with his family doctor as he is feeling better at the present time         Amount and/or Complexity of Data Reviewed  Clinical lab tests: ordered and reviewed  Tests in the radiology section of CPT®: ordered and reviewed  Tests in the medicine section of CPT®: ordered and reviewed  Review and summarize past medical records: yes    Patient Progress  Patient progress: stable      Disposition  Final diagnoses:   Back pain   Headache     Time reflects when diagnosis was documented in both MDM as applicable and the Disposition within this note     Time User Action Codes Description Comment    3/26/2019 12:14 AM Fabiola Merino [M54 9] Back pain     3/26/2019 12:15 AM Fabiola Merino [R51] Headache       ED Disposition     ED Disposition Condition Date/Time Comment    Discharge Stable Tue Mar 26, 2019 12:14 AM Richar Ohs discharge to home/self care  Follow-up Information     Follow up With Specialties Details Why Contact Info    Sharad Dsouza DO Family Medicine Schedule an appointment as soon as possible for a visit in 1 week  [address]    [address]  [facility]  [phone]            Patient's Medications   Discharge Prescriptions    No medications on file         ED Provider  Electronically Signed by           Danny Nolasco MD  03/26/19 9064

## 2019-03-27 ENCOUNTER — NURSE TRIAGE (OUTPATIENT)
Dept: PHYSICAL THERAPY | Facility: OTHER | Age: 59
End: 2019-03-27

## 2019-03-27 DIAGNOSIS — M54.41 ACUTE BILATERAL LOW BACK PAIN WITH BILATERAL SCIATICA: Primary | ICD-10-CM

## 2019-03-27 DIAGNOSIS — M54.2 ACUTE NECK PAIN: ICD-10-CM

## 2019-03-27 DIAGNOSIS — M54.42 ACUTE BILATERAL LOW BACK PAIN WITH BILATERAL SCIATICA: Primary | ICD-10-CM

## 2019-03-27 LAB
ATRIAL RATE: 80 BPM
P AXIS: 65 DEGREES
PR INTERVAL: 132 MS
QRS AXIS: 29 DEGREES
QRSD INTERVAL: 88 MS
QT INTERVAL: 342 MS
QTC INTERVAL: 394 MS
T WAVE AXIS: 47 DEGREES
VENTRICULAR RATE: 80 BPM

## 2019-03-27 PROCEDURE — 93010 ELECTROCARDIOGRAM REPORT: CPT | Performed by: INTERNAL MEDICINE

## 2019-03-27 NOTE — TELEPHONE ENCOUNTER
Reason for Disposition   Is this a chronic condition? Additional Information   Negative: Is the patient experiencing urine retention? Patient had some urinary hesitancy with initial pain  Patient was evaluated and cleared by ER    Background - Initial Assessment  Clinical complaint: Bilateral lower back pain with pain to bilateral buttocks; neck pain radiating into head causing headache  Date of onset: 3 25 19; with 3 other isolated episodes since December of 2016  Mechanism of injury: Unknown onset of injury    Previous Treatment - Previous Treatment  Previous evaluation: ER evaluation  Current provider: Hamida  Diagnostics: CT of head; CT of chest abdomen and pelvis  Previous treatment: Patient has taken otc ibuprofen 600mg prn; heating pad;    Protocols used:  AMB COMPREHENSIVE SPINE PROGRAM PROTOCOL    Patient complains of acute bilateral lower back pain with pain radiating to bilateral buttocks  He also complains of acute neck pain radiating to head, causing headaches, nausea and chills  Patient reports three other isolated incidences of this pain  The first in Dec  2016, second in Sept 2017, and the third on [place] Day 2018  Patient denies any injuries, accidents or inciting events  Patient also relays a diagnosis of Afib, that he is managed and taking medication for  He denies chest pain  Patient also reports that he is very active working as a  for [employer], and he walks his dog 1 mile, daily  RN provided patient an overview of the Comprehensive Spine Program including: triage assessment, physical therapy, and additional specialist referral options based on symptoms and chronicity  RN explained that Comprehensive Spine program is physical therapy based with the goal of getting the patient scheduled in 24 - 48 hrs    Further explained that we schedule patients for a consultation with one of our advanced spine physical therapists for evaluation which may include treatment  These therapists have their doctorate in PTx  If needed, a telemed visit could occur at the same time of this consultation if muscle relaxers or a higher dose NSAID is needed  The goal is to get patients treated as quickly as possible so they can return to their normal activities  Research has shown great results when starting physical therapy sooner than later which helps reduce imaging and costs to patients  Patient agreeable to PT  Patient was also offered physiatry consultation for chronic episodes of pain   Patient would like to start with physical therapy only first

## 2019-03-28 ENCOUNTER — EVALUATION (OUTPATIENT)
Dept: PHYSICAL THERAPY | Facility: REHABILITATION | Age: 59
End: 2019-03-28
Payer: COMMERCIAL

## 2019-03-28 VITALS — SYSTOLIC BLOOD PRESSURE: 125 MMHG | HEART RATE: 76 BPM | DIASTOLIC BLOOD PRESSURE: 70 MMHG | TEMPERATURE: 99 F

## 2019-03-28 DIAGNOSIS — M54.41 ACUTE BILATERAL LOW BACK PAIN WITH BILATERAL SCIATICA: Primary | ICD-10-CM

## 2019-03-28 DIAGNOSIS — M54.42 ACUTE BILATERAL LOW BACK PAIN WITH BILATERAL SCIATICA: Primary | ICD-10-CM

## 2019-03-28 DIAGNOSIS — M54.2 ACUTE NECK PAIN: ICD-10-CM

## 2019-03-28 PROCEDURE — 97140 MANUAL THERAPY 1/> REGIONS: CPT | Performed by: PHYSICAL THERAPIST

## 2019-03-28 PROCEDURE — 97110 THERAPEUTIC EXERCISES: CPT | Performed by: PHYSICAL THERAPIST

## 2019-03-28 PROCEDURE — 97162 PT EVAL MOD COMPLEX 30 MIN: CPT | Performed by: PHYSICAL THERAPIST

## 2019-03-28 NOTE — PROGRESS NOTES
PT Evaluation     Today's date: 3/28/2019  Patient name: Jessika Monsalve  : 1960  MRN: [id number]  Referring provider: Arianna Johnson MD  Dx:   Encounter Diagnosis     ICD-10-CM    1  Acute bilateral low back pain with bilateral sciatica M54 42 Ambulatory referral to PT spine    M54 41    2  Acute neck pain M54 2 Ambulatory referral to PT spine                  Assessment  Assessment details: Jessika Monsalve is a 60 y o  male presents for PT evaluation via comprehensive spine program   Pt was scores a 3 on his [employer] tool placing him in the category 1 group  Pt presents with min restrictions of thoracic spine, endrange restrictions of trunk rotation bilat causing central LBP  Pt was treated today with grade V mobs for lumbar spine and thoracic spine to improve mobility and issued HEP for flexibility training/stretching  Pt will f/u in 2 weeks for 2nd appointment and is to be DC'd at that time unless signs and symptoms have changed, in which he will then be re-evaluated  Impairments: lacks appropriate home exercise program    Goals  Coats with HEP x1 visit  No pain re-occurrence at f/u in 2 weeks  Plan  Planned therapy interventions: manual therapy, joint mobilization, home exercise program and stretching  Frequency: Will f/u in 2 weeks  Subjective Evaluation    History of Present Illness  Mechanism of injury: Pt arrives to PT today via the comprehensive spine program   Pt was in ED 3/25/19 due to severe LBP and central neck pain with severe HA's  Along with pain complaints, pt notes he also felt chills and did note during pain episode having to urinate frequently  Pt denies loss of control, difficulty voiding, or numbness in saddle region  Pt notes that pain dissipated with medication in ED and was discharged to home with dull ache present currently    Pt notes that over the last 3 years, this has been the 4th episode of similar occurrence, with severe pain dissipating within 24 hours of episode (twice without ED visits and twice with)  Pt denies any activity prior day that he believes may have caused sx's  Pt's last exacerbation was in 2018 in which he also needed to be seen in ED secondary to severity of pain  Pt notes sx's in LB are across his sacrum and c/o burning into bilat buttock with no sx's further distal to glutes  Pt also notes HA's begin as central upper cervical pain and radiate into frontal lobe of head  Pt had CT scan in ED with negative findings, denies any previous x-ray or MRI diagnostic testing  Pt arrives to PT visit with min pain and sx's, high FOTO scores with ability to perform all ADL and work related tasks with very min restriction or pain  Pt does note having stiffness/achyness in central LB in AM which dissipates with activity within the first hour of his day  For work, 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, ambulation, lifting, and negotiation of stairs frequently, all without restrictions since [facility] ED visit      Pain  Current pain ratin  At best pain ratin  At worst pain rating: 10    Patient Goals  Patient goals for therapy: decreased pain and increased motion  Patient goal: Decreae "stiffness"        Objective     Neurological Testing     Sensation   Cervical/Thoracic   Left   Intact: light touch    Right   Intact: light touch    Lumbar   Left   Intact: light touch    Right   Intact: light touch    Reflexes   Left   Biceps (C5/C6): trace (1+)  Brachioradialis (C6): trace (1+)  Triceps (C7): trace (1+)  Patellar (L4): trace (1+)  Achilles (S1): trace (1+)  Bergman's reflex: negative  Babinski sign: negative  Clonus sign: negative    Right   Biceps (C5/C6): trace (1+)  Brachioradialis (C6): trace (1+)  Triceps (C7): trace (1+)  Patellar (L4): trace (1+)  Achilles (S1): trace (1+)  Bergman's reflex: negative  Babinski sign: negative  Clonus sign: negative    Additional Neurological Details  Trace DTR bilat UE/LE equal bilat  Active Range of Motion   Cervical/Thoracic Spine       Cervical  Subcranial protraction:  WFL   Subcranial retraction:  WFL   Flexion: 60 degrees  WFL  Extension: 50 degrees     WFL  Left lateral flexion: 30 degrees     WFL  Right lateral flexion: 30 degrees     WFL  Left rotation: 65 degrees WFL  Right rotation: 65 degrees    WFL    Lumbar   Flexion:  WFL  Extension:  WFL  Left lateral flexion:  WFL  Right lateral flexion:  WFL  Left rotation:  Restriction level: minimal  Right rotation:  Restriction level: minimal    Additional Active Range of Motion Details  Pt has negative TTP throughout cervical spine and suboccipital   Pt has normal cervical ROM in all directions as above without concordant signs  Pt demo's normal trunk ROM without concordant signs  Pt did report less "stiffness" central Lb (L5/base of sacrum) with repeated lumbar flexion testing  Passive Range of Motion     Lumbar   Left rotation:  Restriction level: minimal  Right rotation:  Restriction level: minimal    Joint Play   Joints within functional limits: L2, L3, L4, L5 and S1     Hypomobile: T8, T9, T10, T11, T12 and L1     Strength/Myotome Testing   Cervical Spine     Left   Normal strength    Right   Normal strength    Lumbar   Left   Normal strength    Right   Normal strength    Additional Strength Details  No pain with resisted UE myotome testing  Tests   Cervical   Negative repeated extension, repeated flexion, vertical compression and lumbar distraction  Left   Negative Spurling's Test A and cervical flexion-rotation test      Right   Negative Spurling's Test A  Thoracic   Negative slump test      Left Shoulder   Negative ULTT1  Right Shoulder   Negative ULTT1  Lumbar   Negative vertical compression  Left   Negative crossed SLR, passive SLR, quadrant and slump test      Right   Negative crossed SLR, passive SLR, quadrant and slump test      Left Hip   Negative JASON and BREEIR  Right Hip   Negative JASON and FADIR  Additional Tests Details  Pt has slight tightness with cervical flexion rotation test on left with endrange right rotation, however equal mobility bilat and no concordant signs  Pt has normal cervical CPA and UPA PAIVM testing throughout  Pt demo's slight increase in thoracic kypohosis with diminished thoracic PAIVM throughout, no concordant signs  Normal hip rotation ROM             Precautions: Afib    Specialty Daily Treatment Diary     Manual  3/28/19       Grade V SL lumbar rotational mob 4 mins       Grade V pistol supine thoracic mob 4 mins                                   Exercise Diary         Hamstring stretch 30"x3 ea       Quadruped cat/camel st 5"x10 ea       Kneeling Prayer stretch 10"x5                                                                                                                                                    Modalities

## 2019-04-11 ENCOUNTER — OFFICE VISIT (OUTPATIENT)
Dept: PHYSICAL THERAPY | Facility: REHABILITATION | Age: 59
End: 2019-04-11
Payer: COMMERCIAL

## 2019-04-11 DIAGNOSIS — M54.41 ACUTE BILATERAL LOW BACK PAIN WITH BILATERAL SCIATICA: Primary | ICD-10-CM

## 2019-04-11 DIAGNOSIS — M54.42 ACUTE BILATERAL LOW BACK PAIN WITH BILATERAL SCIATICA: Primary | ICD-10-CM

## 2019-04-11 DIAGNOSIS — M54.2 ACUTE NECK PAIN: ICD-10-CM

## 2019-04-11 PROCEDURE — 97110 THERAPEUTIC EXERCISES: CPT | Performed by: PHYSICAL THERAPIST

## 2019-04-11 PROCEDURE — 97112 NEUROMUSCULAR REEDUCATION: CPT | Performed by: PHYSICAL THERAPIST

## 2019-04-11 PROCEDURE — 97140 MANUAL THERAPY 1/> REGIONS: CPT | Performed by: PHYSICAL THERAPIST

## 2019-05-10 ENCOUNTER — OFFICE VISIT (OUTPATIENT)
Dept: FAMILY MEDICINE CLINIC | Facility: CLINIC | Age: 59
End: 2019-05-10
Payer: COMMERCIAL

## 2019-05-10 VITALS
SYSTOLIC BLOOD PRESSURE: 134 MMHG | WEIGHT: 200.6 LBS | DIASTOLIC BLOOD PRESSURE: 76 MMHG | HEIGHT: 72 IN | BODY MASS INDEX: 27.17 KG/M2 | OXYGEN SATURATION: 96 % | HEART RATE: 70 BPM

## 2019-05-10 DIAGNOSIS — Z00.00 WELL ADULT HEALTH CHECK: Primary | ICD-10-CM

## 2019-05-10 PROBLEM — Z79.01 ANTICOAGULATED: Status: ACTIVE | Noted: 2019-05-10

## 2019-05-10 PROBLEM — Z87.891 FORMER SMOKER: Status: ACTIVE | Noted: 2019-05-10

## 2019-05-10 PROCEDURE — 99396 PREV VISIT EST AGE 40-64: CPT | Performed by: FAMILY MEDICINE

## 2019-06-06 ENCOUNTER — OFFICE VISIT (OUTPATIENT)
Dept: DERMATOLOGY | Facility: CLINIC | Age: 59
End: 2019-06-06
Payer: COMMERCIAL

## 2019-06-06 VITALS — WEIGHT: 200 LBS | BODY MASS INDEX: 27.09 KG/M2 | HEIGHT: 72 IN | TEMPERATURE: 98.4 F

## 2019-06-06 DIAGNOSIS — L82.1 SEBORRHEIC KERATOSIS: ICD-10-CM

## 2019-06-06 DIAGNOSIS — L81.4 LENTIGO: Primary | ICD-10-CM

## 2019-06-06 DIAGNOSIS — D18.01 CHERRY ANGIOMA: ICD-10-CM

## 2019-06-06 PROCEDURE — 99204 OFFICE O/P NEW MOD 45 MIN: CPT | Performed by: DERMATOLOGY

## 2019-10-11 ENCOUNTER — OFFICE VISIT (OUTPATIENT)
Dept: CARDIOLOGY CLINIC | Facility: CLINIC | Age: 59
End: 2019-10-11
Payer: COMMERCIAL

## 2019-10-11 VITALS
SYSTOLIC BLOOD PRESSURE: 140 MMHG | HEART RATE: 70 BPM | DIASTOLIC BLOOD PRESSURE: 88 MMHG | HEIGHT: 72 IN | WEIGHT: 205 LBS | BODY MASS INDEX: 27.77 KG/M2

## 2019-10-11 DIAGNOSIS — I10 ESSENTIAL HYPERTENSION: ICD-10-CM

## 2019-10-11 DIAGNOSIS — I48.92 ATRIAL FLUTTER, UNSPECIFIED TYPE (HCC): ICD-10-CM

## 2019-10-11 DIAGNOSIS — E78.00 HYPERCHOLESTEROLEMIA: ICD-10-CM

## 2019-10-11 DIAGNOSIS — Z79.01 ANTICOAGULATED: ICD-10-CM

## 2019-10-11 DIAGNOSIS — I48.92 ATRIAL FLUTTER, UNSPECIFIED TYPE (HCC): Primary | ICD-10-CM

## 2019-10-11 PROCEDURE — 93000 ELECTROCARDIOGRAM COMPLETE: CPT | Performed by: INTERNAL MEDICINE

## 2019-10-11 PROCEDURE — 99213 OFFICE O/P EST LOW 20 MIN: CPT | Performed by: INTERNAL MEDICINE

## 2019-10-11 RX ORDER — DILTIAZEM HYDROCHLORIDE 120 MG/1
120 CAPSULE, COATED, EXTENDED RELEASE ORAL DAILY
Qty: 90 CAPSULE | Refills: 3 | Status: SHIPPED | OUTPATIENT
Start: 2019-10-11 | End: 2020-10-08

## 2019-10-11 RX ORDER — ATORVASTATIN CALCIUM 10 MG/1
10 TABLET, FILM COATED ORAL DAILY
Qty: 90 TABLET | Refills: 3 | Status: SHIPPED | OUTPATIENT
Start: 2019-10-11 | End: 2020-10-06

## 2019-10-11 RX ORDER — LOSARTAN POTASSIUM 25 MG/1
25 TABLET ORAL DAILY
Qty: 90 TABLET | Refills: 3 | Status: SHIPPED | OUTPATIENT
Start: 2019-10-11 | End: 2020-10-06 | Stop reason: SDUPTHER

## 2019-10-11 NOTE — PROGRESS NOTES
EPS Progress Note - Maisha Charles 60 y o  male MRN: [id number]           ASSESSMENT:  1  Atrial flutter, unspecified type ([address] )  POCT ECG   2  Hypercholesterolemia  POCT ECG   3  Essential hypertension     4  Anticoagulated             PLAN:  History of paroxysmal atrial flutter  Cannot rule out underlying atrial fibrillation  He has had no recurrence from a symptom standpoint and he is appropriately anticoagulated  He is on appropriate medical therapy and would recommend continuing this  He will follow-up in one year    HPI:   Interim history he is doing well he offers no complaints no palpitations no chest pain no shortness of breath no lightheadedness all other 12 point ROS negative          ROS:  As above all other ROS negative       Objective:     Vitals: Blood pressure 140/88, pulse 70, height 6' (1 829 m), weight 93 kg (205 lb)  , Body mass index is 27 8 kg/m²  ,        Physical Exam:    GEN: Maisha Charles appears well, alert and oriented x 3, pleasant and cooperative   HEENT: pupils equal, round, and reactive to light; extraocular muscles intact  NECK: supple, no carotid bruits   HEART: regular rhythm, normal S1 and S2, no murmurs, clicks, gallops or rubs   LUNGS: clear to auscultation bilaterally; no wheezes, rales, or rhonchi   ABDOMEN: normal bowel sounds, soft, no tenderness, no distention  EXTREMITIES: peripheral pulses normal; no clubbing, cyanosis, or edema  NEURO: no focal findings   SKIN: normal without suspicious lesions on exposed skin    Medications:      Current Outpatient Medications:     apixaban (ELIQUIS) 5 mg, Take 1 tablet (5 mg total) by mouth 2 (two) times a day, Disp: 180 tablet, Rfl: 3    atorvastatin (LIPITOR) 10 mg tablet, Take 1 tablet (10 mg total) by mouth daily, Disp: 90 tablet, Rfl: 3    diltiazem (CARDIZEM CD) 120 mg 24 hr capsule, Take 1 capsule (120 mg total) by mouth daily, Disp: 90 capsule, Rfl: 3    EPINEPHrine (EPIPEN) 0 3 mg/0 3 mL SOAJ, Inject as directed, Disp: , Rfl:     losartan (COZAAR) 25 mg tablet, Take 1 tablet (25 mg total) by mouth daily, Disp: 90 tablet, Rfl: 3     Family History   Problem Relation Age of Onset    Other Mother         Sarcoma    Hypertension Father      Social History     Socioeconomic History    Marital status: Single     Spouse name: Not on file    Number of children: Not on file    Years of education: Not on file    Highest education level: Not on file   Occupational History    Occupation: [employer], dispatch     Employer: [employer]   Social Needs    Financial resource strain: Not on file    Food insecurity:     Worry: Not on file     Inability: Not on file    Transportation needs:     Medical: Not on file     Non-medical: Not on file   Tobacco Use    Smoking status: Former Smoker     Last attempt to quit: 2014     Years since quittin 8    Smokeless tobacco: Never Used   Substance and Sexual Activity    Alcohol use:  Yes     Alcohol/week: 7 0 standard drinks     Types: 7 Glasses of wine per week     Comment: Social drinker    Drug use: No    Sexual activity: Not on file   Lifestyle    Physical activity:     Days per week: Not on file     Minutes per session: Not on file    Stress: Not on file   Relationships    Social connections:     Talks on phone: Not on file     Gets together: Not on file     Attends Protestant service: Not on file     Active member of club or organization: Not on file     Attends meetings of clubs or organizations: Not on file     Relationship status: Not on file    Intimate partner violence:     Fear of current or ex partner: Not on file     Emotionally abused: Not on file     Physically abused: Not on file     Forced sexual activity: Not on file   Other Topics Concern    Not on file   Social History Narrative    Recreational activities:  Walking     Social History     Tobacco Use   Smoking Status Former Smoker    Last attempt to quit: 2014    Years since quittin 8 Smokeless Tobacco Never Used     Social History     Substance and Sexual Activity   Alcohol Use Yes    Alcohol/week: 7 0 standard drinks    Types: 7 Glasses of wine per week    Comment: Social drinker       Labs & Results:  Below is the patient's most recent value for Albumin, ALT, AST, BUN, Calcium, Chloride, Cholesterol, CO2, Creatinine, GFR, Glucose, HDL, Hematocrit, Hemoglobin, Hemoglobin A1C, LDL, Magnesium, Phosphorus, Platelets, Potassium, PSA, Sodium, Triglycerides, and WBC  Lab Results   Component Value Date    ALT 47 2018    AST 30 2018    BUN 17 2019    CALCIUM 8 9 2019     2019    CHOL 154 2015    CO2 27 2019    CREATININE 1 20 2019    HDL 49 2018    HCT 43 1 2019    HGB 15 0 2019    HGBA1C 5 3 2018    MG 1 9 2019     2019    K 4 0 2019    PSA 0 8 2018     2015    TRIG 95 2018    WBC 9 37 2019     Note: for a comprehensive list of the patient's lab results, access the Results Review activity  Cardiac testing:   Results for orders placed during the hospital encounter of 19   Echo complete with contrast if indicated    Narrative [facility] [address]  [facility] [address] [facility]  [phone]    Transthoracic Echocardiogram  2D, M-mode, Doppler, and Color Doppler    Study date:  2019    Patient: Ab Shaikh  MR number: [id number]  Account number: [de-identified]  : 1960  Age: 60 years  Gender: Male  Status: Outpatient  Location: [address] [place] [address] [facility]  Height: 73 in  Weight: 202 lb  BP: 139/ 79 mmHg    Indications: Atrial flutter    Diagnoses: I48 1 - Atrial flutter    Sonographer:  LAZARO Perez  Interpreting Physician:  Rebecca Noble DO  Primary Physician:  Malina Bahena DO  Referring Physician:  Philly Givens DO  Group:  [facility]  cc:  Riddhi Barrera DO  Cardiology Fellow:  Luke Garza MD    SUMMARY    LEFT VENTRICLE:  Systolic function was normal  Ejection fraction was estimated to be 55 %  There were no regional wall motion abnormalities  RIGHT VENTRICLE:  The size was normal   Systolic function was normal     HISTORY: PRIOR HISTORY: Hypertension    PROCEDURE: The study was performed in the [address]  This was a routine study  The transthoracic approach was used  The study included complete 2D imaging, M-mode, complete spectral Doppler, and color Doppler  The  heart rate was 68 bpm, at the start of the study  Images were obtained from the parasternal, apical, subcostal, and suprasternal notch acoustic windows  Echocardiographic views were limited due to poor acoustic window availability  Image  quality was adequate  LEFT VENTRICLE: Size was normal  Systolic function was normal  Ejection fraction was estimated to be 55 %  There were no regional wall motion abnormalities  Wall thickness was normal  DOPPLER: Left ventricular diastolic function parameters  were normal for the patient's age  RIGHT VENTRICLE: The size was normal  Systolic function was normal  Wall thickness was normal     LEFT ATRIUM: Size was normal     RIGHT ATRIUM: Size was normal     MITRAL VALVE: Valve structure was normal  There was normal leaflet separation  DOPPLER: The transmitral velocity was within the normal range  There was no evidence for stenosis  There was trace regurgitation  AORTIC VALVE: The valve was trileaflet  Leaflets exhibited mildly increased thickness, mild calcification, and normal cuspal separation  DOPPLER: Transaortic velocity was within the normal range  There was no evidence for stenosis  There  was no significant regurgitation  TRICUSPID VALVE: The valve structure was normal  There was normal leaflet separation  DOPPLER: The transtricuspid velocity was within the normal range  There was no evidence for stenosis   There was no significant regurgitation  PULMONIC VALVE: Leaflets exhibited normal thickness, no calcification, and normal cuspal separation  DOPPLER: The transpulmonic velocity was within the normal range  There was no significant regurgitation  PERICARDIUM: There was no pericardial effusion  AORTA: The root exhibited normal size  SYSTEMIC VEINS: IVC: The inferior vena cava was normal in size  SYSTEM MEASUREMENT TABLES    2D  %FS: 30 2 %  Ao Diam: 3 29 cm  EDV(Teich): 120 65 ml  EF(Cube): 65 99 %  EF(Teich): 57 24 %  ESV(Cube): 43 64 ml  ESV(Teich): 51 59 ml  IVSd: 0 88 cm  LA Area: 17 26 cm2  LA Diam: 3 48 cm  LVEDV MOD A4C: 109 23 ml  LVEF MOD A4C: 49 2 %  LVESV MOD A4C: 55 5 ml  LVIDd: 5 04 cm  LVIDs: 3 52 cm  LVLd A4C: 7 81 cm  LVLs A4C: 6 85 cm  LVPWd: 0 97 cm  RA Area: 13 92 cm2  RV Diam : 3 75 cm  SV MOD A4C: 53 74 ml  SV(Cube): 84 66 ml  SV(Teich): 69 06 ml    CW  TR Vmax: 2 18 m/s  TR maxP 08 mmHg    MM  TAPSE: 1 98 cm    PW  E': 0 06 m/s  E/E': 8 6  MV A Moses: 0 51 m/s  MV Dec Henderson: 1 8 m/s2  MV DecT: 281 82 ms  MV E Moses: 0 51 m/s  MV E/A Ratio: 1    [facility] Accredited Echocardi[facility]    Prepared and electronically signed by    Zia Barcenas DO  Signed 2019 10:03:56       No results found for this or any previous visit  No results found for this or any previous visit  No results found for this or any previous visit

## 2019-11-01 ENCOUNTER — OFFICE VISIT (OUTPATIENT)
Dept: FAMILY MEDICINE CLINIC | Facility: CLINIC | Age: 59
End: 2019-11-01
Payer: COMMERCIAL

## 2019-11-01 VITALS
WEIGHT: 207.6 LBS | SYSTOLIC BLOOD PRESSURE: 132 MMHG | OXYGEN SATURATION: 95 % | BODY MASS INDEX: 28.16 KG/M2 | DIASTOLIC BLOOD PRESSURE: 78 MMHG | TEMPERATURE: 96.8 F | HEART RATE: 93 BPM

## 2019-11-01 DIAGNOSIS — I48.92 ATRIAL FLUTTER, UNSPECIFIED TYPE (HCC): ICD-10-CM

## 2019-11-01 DIAGNOSIS — Z12.5 SCREENING PSA (PROSTATE SPECIFIC ANTIGEN): ICD-10-CM

## 2019-11-01 DIAGNOSIS — I10 ESSENTIAL HYPERTENSION: Primary | ICD-10-CM

## 2019-11-01 DIAGNOSIS — E78.00 HYPERCHOLESTEROLEMIA: ICD-10-CM

## 2019-11-01 DIAGNOSIS — Z79.01 ANTICOAGULATED: ICD-10-CM

## 2019-11-01 PROCEDURE — 3075F SYST BP GE 130 - 139MM HG: CPT | Performed by: FAMILY MEDICINE

## 2019-11-01 PROCEDURE — 3078F DIAST BP <80 MM HG: CPT | Performed by: FAMILY MEDICINE

## 2019-11-01 PROCEDURE — 99214 OFFICE O/P EST MOD 30 MIN: CPT | Performed by: FAMILY MEDICINE

## 2019-11-01 NOTE — PATIENT INSTRUCTIONS
He is doing very well, blood pressure is fine after sitting, he will continue on medication as is, diltiazem 120 daily for rate control, blood pressure control, also helps with migraines  Continue on low dose losartan 25 mg daily  Cholesterol was fine before, stay on atorvastatin 10 mg daily, await redo  Also include screening PSA with December blood work, PSA in May 20 180 8  TSH was fine last December  We will see him again in 6 to 8 months with physical, call sooner if needed    He will continue to see Cardiology yearly, due next October

## 2019-11-01 NOTE — PROGRESS NOTES
FAMILY PRACTICE OFFICE VISIT  [facility]  [address], [address]      NAME: Joshua Wolfe  AGE: 60 y o  SEX: male  : 1960   MRN: [id number]    DATE: 2019  TIME: 7:43 AM    Assessment and Plan     Problem List Items Addressed This Visit        Cardiovascular and Mediastinum    Atrial flutter ([address] )    Essential hypertension - Primary    Relevant Orders    Comprehensive metabolic panel       Other    Anticoagulated    Hypercholesterolemia    Relevant Orders    Comprehensive metabolic panel    Lipid panel      Other Visit Diagnoses     Screening PSA (prostate specific antigen)        Relevant Orders    PSA, Total Screen          Patient Instructions   He is doing very well, blood pressure is fine after sitting, he will continue on medication as is, diltiazem 120 daily for rate control, blood pressure control, also helps with migraines  Continue on low dose losartan 25 mg daily  Cholesterol was fine before, stay on atorvastatin 10 mg daily, await redo  Also include screening PSA with December blood work, PSA in May 20 180 8  TSH was fine last December  We will see him again in 6 to 8 months with physical, call sooner if needed  He will continue to see Cardiology yearly, due next October    BMI Counseling: Body mass index is 28 16 kg/m²  The BMI is above normal  Nutrition recommendations include decreasing portion sizes  Exercise recommendations include exercising 3-5 times per week  Chief Complaint     Chief Complaint   Patient presents with    Follow-up     6 month FU       History of Present Illness   Joshua Wolfe is a 60y o -year-old male who is in today for a 6 month check, he has been feeling well, did see Cardiology in October, continues on medication  Has had no palpitations, no issues with bleeding, continues on anticoagulation    Has been walking his dog for exercise  Review of Systems   Review of Systems   Constitutional: Negative for appetite change, fatigue, fever and unexpected weight change  HENT: Negative for sore throat and trouble swallowing  Respiratory: Negative for cough, chest tightness and shortness of breath  Cardiovascular: Negative for chest pain, palpitations and leg swelling  Gastrointestinal: Negative for abdominal pain and blood in stool  No acid reflux     No change in bowel   Genitourinary: Negative for dysuria and hematuria  Neurological: Negative for dizziness, light-headedness and headaches  Hematological: Bruises/bleeds easily (no bleeding)  Psychiatric/Behavioral: Negative for behavioral problems  Active Problem List     Patient Active Problem List   Diagnosis    Allergic rhinitis    Hypercholesterolemia    Essential hypertension    Migraine headache    Nasal polyp    BPH associated with nocturia    Kidney stone    Pulmonary nodule    Atrial flutter (HCC)    Anticoagulated    Former smoker       Past Medical History:  Reviewed    Past Surgical History:  Reviewed    Family History:  Reviewed    Social History:  Reviewed    Objective     Vitals:    11/01/19 0718   BP: 132/78   BP Location: Left arm   Patient Position: Sitting   Cuff Size: Standard   Pulse: 93   Temp: (!) 96 8 °F (36 °C)   TempSrc: Tympanic   SpO2: 95%   Weight: 94 2 kg (207 lb 9 6 oz)     Body mass index is 28 16 kg/m²  BP Readings from Last 3 Encounters:   11/01/19 132/78   10/11/19 140/88   05/10/19 134/76       Wt Readings from Last 3 Encounters:   11/01/19 94 2 kg (207 lb 9 6 oz)   10/11/19 93 kg (205 lb)   06/06/19 90 7 kg (200 lb)       Physical Exam   Constitutional: He is oriented to person, place, and time  He appears well-developed and well-nourished  No distress  HENT:   Mouth/Throat: Oropharynx is clear and moist  No oropharyngeal exudate  TM clear   Eyes: Conjunctivae are normal  No scleral icterus  Cardiovascular: Normal rate, regular rhythm and normal heart sounds  No murmur heard  No carotid bruit   Pulmonary/Chest: Effort normal and breath sounds normal  No respiratory distress  Abdominal: Soft  There is no tenderness  Musculoskeletal: He exhibits no edema  Lymphadenopathy:     He has no cervical adenopathy  Neurological: He is alert and oriented to person, place, and time  Psychiatric: He has a normal mood and affect  His behavior is normal        ALLERGIES:  Allergies   Allergen Reactions    Dust Mite Extract     Lisinopril Edema     Annotation - [id number]: lip swelling x 2 2015    Short Ragweed Pollen Ext        Current Medications     Current Outpatient Medications   Medication Sig Dispense Refill    apixaban (ELIQUIS) 5 mg Take 1 tablet (5 mg total) by mouth 2 (two) times a day 180 tablet 3    atorvastatin (LIPITOR) 10 mg tablet Take 1 tablet (10 mg total) by mouth daily 90 tablet 3    diltiazem (CARDIZEM CD) 120 mg 24 hr capsule Take 1 capsule (120 mg total) by mouth daily 90 capsule 3    EPINEPHrine (EPIPEN) 0 3 mg/0 3 mL SOAJ Inject as directed      losartan (COZAAR) 25 mg tablet Take 1 tablet (25 mg total) by mouth daily 90 tablet 3     No current facility-administered medications for this visit               Orders Placed This Encounter   Procedures    Comprehensive metabolic panel    Lipid panel    PSA, Total Screen         Kim Parra DO

## 2019-12-04 ENCOUNTER — APPOINTMENT (OUTPATIENT)
Dept: LAB | Facility: CLINIC | Age: 59
End: 2019-12-04
Payer: COMMERCIAL

## 2019-12-04 DIAGNOSIS — Z12.5 SCREENING PSA (PROSTATE SPECIFIC ANTIGEN): ICD-10-CM

## 2019-12-04 LAB
ALBUMIN SERPL BCP-MCNC: 3.8 G/DL (ref 3.5–5)
ALP SERPL-CCNC: 79 U/L (ref 46–116)
ALT SERPL W P-5'-P-CCNC: 44 U/L (ref 12–78)
ANION GAP SERPL CALCULATED.3IONS-SCNC: 3 MMOL/L (ref 4–13)
AST SERPL W P-5'-P-CCNC: 22 U/L (ref 5–45)
BILIRUB SERPL-MCNC: 0.54 MG/DL (ref 0.2–1)
BUN SERPL-MCNC: 17 MG/DL (ref 5–25)
CALCIUM SERPL-MCNC: 9.1 MG/DL (ref 8.3–10.1)
CHLORIDE SERPL-SCNC: 106 MMOL/L (ref 100–108)
CHOLEST SERPL-MCNC: 167 MG/DL (ref 50–200)
CO2 SERPL-SCNC: 27 MMOL/L (ref 21–32)
CREAT SERPL-MCNC: 1.07 MG/DL (ref 0.6–1.3)
GFR SERPL CREATININE-BSD FRML MDRD: 75 ML/MIN/1.73SQ M
GLUCOSE P FAST SERPL-MCNC: 106 MG/DL (ref 65–99)
HDLC SERPL-MCNC: 60 MG/DL
LDLC SERPL CALC-MCNC: 87 MG/DL (ref 0–100)
NONHDLC SERPL-MCNC: 107 MG/DL
POTASSIUM SERPL-SCNC: 4 MMOL/L (ref 3.5–5.3)
PROT SERPL-MCNC: 7.1 G/DL (ref 6.4–8.2)
PSA SERPL-MCNC: 0.8 NG/ML (ref 0–4)
SODIUM SERPL-SCNC: 136 MMOL/L (ref 136–145)
TRIGL SERPL-MCNC: 98 MG/DL

## 2019-12-04 PROCEDURE — G0103 PSA SCREENING: HCPCS

## 2019-12-04 PROCEDURE — 80053 COMPREHEN METABOLIC PANEL: CPT | Performed by: FAMILY MEDICINE

## 2019-12-04 PROCEDURE — 80061 LIPID PANEL: CPT | Performed by: FAMILY MEDICINE

## 2019-12-04 PROCEDURE — 36415 COLL VENOUS BLD VENIPUNCTURE: CPT | Performed by: FAMILY MEDICINE

## 2019-12-24 ENCOUNTER — CLINICAL SUPPORT (OUTPATIENT)
Dept: FAMILY MEDICINE CLINIC | Facility: CLINIC | Age: 59
End: 2019-12-24
Payer: COMMERCIAL

## 2019-12-24 DIAGNOSIS — Z23 NEED FOR ZOSTER VACCINE: Primary | ICD-10-CM

## 2019-12-24 PROCEDURE — 90471 IMMUNIZATION ADMIN: CPT

## 2019-12-24 PROCEDURE — 90750 HZV VACC RECOMBINANT IM: CPT

## 2020-01-21 ENCOUNTER — TELEPHONE (OUTPATIENT)
Dept: CARDIOLOGY CLINIC | Facility: CLINIC | Age: 60
End: 2020-01-21

## 2020-01-21 NOTE — TELEPHONE ENCOUNTER
[facility] called to report that on 10/11/2019 they dispensed the incorrect form of the cardizem  They state they have been filling it as cardizem CD however filled it as DIlt XR   They state it is still a 24 hour drug but had to make you aware

## 2020-05-08 ENCOUNTER — OFFICE VISIT (OUTPATIENT)
Dept: FAMILY MEDICINE CLINIC | Facility: CLINIC | Age: 60
End: 2020-05-08
Payer: COMMERCIAL

## 2020-05-08 VITALS
HEIGHT: 72 IN | SYSTOLIC BLOOD PRESSURE: 120 MMHG | DIASTOLIC BLOOD PRESSURE: 84 MMHG | BODY MASS INDEX: 28.04 KG/M2 | OXYGEN SATURATION: 96 % | HEART RATE: 80 BPM | TEMPERATURE: 96.7 F | WEIGHT: 207 LBS

## 2020-05-08 DIAGNOSIS — Z13.1 SCREENING FOR DIABETES MELLITUS: ICD-10-CM

## 2020-05-08 DIAGNOSIS — Z79.01 ANTICOAGULATED: ICD-10-CM

## 2020-05-08 DIAGNOSIS — E78.00 HYPERCHOLESTEROLEMIA: ICD-10-CM

## 2020-05-08 DIAGNOSIS — I10 ESSENTIAL HYPERTENSION: ICD-10-CM

## 2020-05-08 DIAGNOSIS — Z23 NEED FOR SHINGLES VACCINE: ICD-10-CM

## 2020-05-08 DIAGNOSIS — I48.92 ATRIAL FLUTTER, UNSPECIFIED TYPE (HCC): ICD-10-CM

## 2020-05-08 DIAGNOSIS — Z00.00 WELL ADULT HEALTH CHECK: Primary | ICD-10-CM

## 2020-05-08 PROCEDURE — 90471 IMMUNIZATION ADMIN: CPT

## 2020-05-08 PROCEDURE — 90750 HZV VACC RECOMBINANT IM: CPT

## 2020-05-08 PROCEDURE — 99396 PREV VISIT EST AGE 40-64: CPT | Performed by: FAMILY MEDICINE

## 2020-10-06 DIAGNOSIS — I10 ESSENTIAL HYPERTENSION: ICD-10-CM

## 2020-10-06 DIAGNOSIS — E78.00 HYPERCHOLESTEROLEMIA: ICD-10-CM

## 2020-10-06 DIAGNOSIS — I48.92 ATRIAL FLUTTER, UNSPECIFIED TYPE (HCC): ICD-10-CM

## 2020-10-06 RX ORDER — LOSARTAN POTASSIUM 25 MG/1
25 TABLET ORAL DAILY
Qty: 90 TABLET | Refills: 0 | Status: SHIPPED | OUTPATIENT
Start: 2020-10-06 | End: 2021-01-11 | Stop reason: SDUPTHER

## 2020-10-06 RX ORDER — ATORVASTATIN CALCIUM 10 MG/1
10 TABLET, FILM COATED ORAL DAILY
Qty: 90 TABLET | Refills: 0 | Status: SHIPPED | OUTPATIENT
Start: 2020-10-06 | End: 2021-01-11 | Stop reason: SDUPTHER

## 2020-10-08 DIAGNOSIS — I48.92 ATRIAL FLUTTER, UNSPECIFIED TYPE (HCC): ICD-10-CM

## 2020-10-08 RX ORDER — DILTIAZEM HYDROCHLORIDE 120 MG/1
CAPSULE, COATED, EXTENDED RELEASE ORAL
Qty: 90 CAPSULE | Refills: 0 | Status: SHIPPED | OUTPATIENT
Start: 2020-10-08 | End: 2020-10-09 | Stop reason: SDUPTHER

## 2020-10-09 DIAGNOSIS — I48.92 ATRIAL FLUTTER, UNSPECIFIED TYPE (HCC): ICD-10-CM

## 2020-10-09 RX ORDER — DILTIAZEM HYDROCHLORIDE 120 MG/1
120 CAPSULE, COATED, EXTENDED RELEASE ORAL DAILY
Qty: 90 CAPSULE | Refills: 0 | Status: SHIPPED | OUTPATIENT
Start: 2020-10-09 | End: 2021-01-11 | Stop reason: SDUPTHER

## 2020-11-18 ENCOUNTER — OFFICE VISIT (OUTPATIENT)
Dept: CARDIOLOGY CLINIC | Facility: CLINIC | Age: 60
End: 2020-11-18
Payer: COMMERCIAL

## 2020-11-18 VITALS
HEIGHT: 72 IN | BODY MASS INDEX: 28.71 KG/M2 | WEIGHT: 212 LBS | DIASTOLIC BLOOD PRESSURE: 92 MMHG | HEART RATE: 70 BPM | TEMPERATURE: 98.8 F | SYSTOLIC BLOOD PRESSURE: 130 MMHG

## 2020-11-18 DIAGNOSIS — E78.00 HYPERCHOLESTEROLEMIA: ICD-10-CM

## 2020-11-18 DIAGNOSIS — Z79.01 ANTICOAGULATED: ICD-10-CM

## 2020-11-18 DIAGNOSIS — I48.92 ATRIAL FLUTTER, UNSPECIFIED TYPE (HCC): Primary | ICD-10-CM

## 2020-11-18 DIAGNOSIS — I10 ESSENTIAL HYPERTENSION: ICD-10-CM

## 2020-11-18 PROCEDURE — 99214 OFFICE O/P EST MOD 30 MIN: CPT | Performed by: INTERNAL MEDICINE

## 2020-11-18 PROCEDURE — 93000 ELECTROCARDIOGRAM COMPLETE: CPT | Performed by: INTERNAL MEDICINE

## 2020-11-27 ENCOUNTER — APPOINTMENT (OUTPATIENT)
Dept: LAB | Facility: CLINIC | Age: 60
End: 2020-11-27
Payer: COMMERCIAL

## 2020-11-27 ENCOUNTER — OFFICE VISIT (OUTPATIENT)
Dept: FAMILY MEDICINE CLINIC | Facility: CLINIC | Age: 60
End: 2020-11-27
Payer: COMMERCIAL

## 2020-11-27 VITALS
HEART RATE: 79 BPM | SYSTOLIC BLOOD PRESSURE: 132 MMHG | HEIGHT: 71 IN | DIASTOLIC BLOOD PRESSURE: 86 MMHG | OXYGEN SATURATION: 98 % | TEMPERATURE: 97.1 F | BODY MASS INDEX: 29.96 KG/M2 | WEIGHT: 214 LBS

## 2020-11-27 DIAGNOSIS — E78.00 HYPERCHOLESTEROLEMIA: ICD-10-CM

## 2020-11-27 DIAGNOSIS — Z13.1 SCREENING FOR DIABETES MELLITUS: ICD-10-CM

## 2020-11-27 DIAGNOSIS — I10 ESSENTIAL HYPERTENSION: ICD-10-CM

## 2020-11-27 DIAGNOSIS — I48.92 ATRIAL FLUTTER, UNSPECIFIED TYPE (HCC): ICD-10-CM

## 2020-11-27 DIAGNOSIS — I10 ESSENTIAL HYPERTENSION: Primary | ICD-10-CM

## 2020-11-27 DIAGNOSIS — Z79.01 ANTICOAGULATED: ICD-10-CM

## 2020-11-27 LAB
ALBUMIN SERPL BCP-MCNC: 3.8 G/DL (ref 3.5–5)
ALP SERPL-CCNC: 92 U/L (ref 46–116)
ALT SERPL W P-5'-P-CCNC: 34 U/L (ref 12–78)
ANION GAP SERPL CALCULATED.3IONS-SCNC: 6 MMOL/L (ref 4–13)
AST SERPL W P-5'-P-CCNC: 19 U/L (ref 5–45)
BACTERIA UR QL AUTO: ABNORMAL /HPF
BILIRUB SERPL-MCNC: 0.52 MG/DL (ref 0.2–1)
BILIRUB UR QL STRIP: NEGATIVE
BUN SERPL-MCNC: 13 MG/DL (ref 5–25)
CALCIUM SERPL-MCNC: 8.9 MG/DL (ref 8.3–10.1)
CHLORIDE SERPL-SCNC: 107 MMOL/L (ref 100–108)
CHOLEST SERPL-MCNC: 148 MG/DL (ref 50–200)
CLARITY UR: CLEAR
CO2 SERPL-SCNC: 27 MMOL/L (ref 21–32)
COLOR UR: YELLOW
CREAT SERPL-MCNC: 1.03 MG/DL (ref 0.6–1.3)
EST. AVERAGE GLUCOSE BLD GHB EST-MCNC: 105 MG/DL
GFR SERPL CREATININE-BSD FRML MDRD: 78 ML/MIN/1.73SQ M
GLUCOSE P FAST SERPL-MCNC: 90 MG/DL (ref 65–99)
GLUCOSE UR STRIP-MCNC: NEGATIVE MG/DL
HBA1C MFR BLD: 5.3 %
HDLC SERPL-MCNC: 67 MG/DL
HGB UR QL STRIP.AUTO: ABNORMAL
HYALINE CASTS #/AREA URNS LPF: ABNORMAL /LPF
KETONES UR STRIP-MCNC: NEGATIVE MG/DL
LDLC SERPL CALC-MCNC: 65 MG/DL (ref 0–100)
LEUKOCYTE ESTERASE UR QL STRIP: NEGATIVE
NITRITE UR QL STRIP: NEGATIVE
NON-SQ EPI CELLS URNS QL MICRO: ABNORMAL /HPF
NONHDLC SERPL-MCNC: 81 MG/DL
PH UR STRIP.AUTO: 6.5 [PH]
POTASSIUM SERPL-SCNC: 4.2 MMOL/L (ref 3.5–5.3)
PROT SERPL-MCNC: 7.5 G/DL (ref 6.4–8.2)
PROT UR STRIP-MCNC: NEGATIVE MG/DL
RBC #/AREA URNS AUTO: ABNORMAL /HPF
SODIUM SERPL-SCNC: 140 MMOL/L (ref 136–145)
SP GR UR STRIP.AUTO: 1.01 (ref 1–1.03)
TRIGL SERPL-MCNC: 79 MG/DL
UROBILINOGEN UR QL STRIP.AUTO: 0.2 E.U./DL
WBC #/AREA URNS AUTO: ABNORMAL /HPF

## 2020-11-27 PROCEDURE — 99214 OFFICE O/P EST MOD 30 MIN: CPT | Performed by: FAMILY MEDICINE

## 2020-11-27 PROCEDURE — 80061 LIPID PANEL: CPT

## 2020-11-27 PROCEDURE — 80053 COMPREHEN METABOLIC PANEL: CPT

## 2020-11-27 PROCEDURE — 81001 URINALYSIS AUTO W/SCOPE: CPT

## 2020-11-27 PROCEDURE — 83036 HEMOGLOBIN GLYCOSYLATED A1C: CPT

## 2020-11-27 PROCEDURE — 36415 COLL VENOUS BLD VENIPUNCTURE: CPT

## 2020-11-27 RX ORDER — FLUTICASONE PROPIONATE 50 MCG
2 SPRAY, SUSPENSION (ML) NASAL DAILY
COMMUNITY

## 2020-12-28 ENCOUNTER — IMMUNIZATIONS (OUTPATIENT)
Dept: FAMILY MEDICINE CLINIC | Facility: HOSPITAL | Age: 60
End: 2020-12-28
Payer: COMMERCIAL

## 2020-12-28 DIAGNOSIS — Z23 ENCOUNTER FOR IMMUNIZATION: ICD-10-CM

## 2020-12-28 PROCEDURE — 0011A SARS-COV-2 / COVID-19 MRNA VACCINE (MODERNA) 100 MCG: CPT

## 2020-12-28 PROCEDURE — 91301 SARS-COV-2 / COVID-19 MRNA VACCINE (MODERNA) 100 MCG: CPT

## 2021-01-11 DIAGNOSIS — I10 ESSENTIAL HYPERTENSION: ICD-10-CM

## 2021-01-11 DIAGNOSIS — I48.92 ATRIAL FLUTTER, UNSPECIFIED TYPE (HCC): ICD-10-CM

## 2021-01-11 DIAGNOSIS — E78.00 HYPERCHOLESTEROLEMIA: ICD-10-CM

## 2021-01-11 RX ORDER — ATORVASTATIN CALCIUM 10 MG/1
10 TABLET, FILM COATED ORAL DAILY
Qty: 90 TABLET | Refills: 0 | Status: SHIPPED | OUTPATIENT
Start: 2021-01-11 | End: 2021-04-12 | Stop reason: SDUPTHER

## 2021-01-11 RX ORDER — DILTIAZEM HYDROCHLORIDE 120 MG/1
120 CAPSULE, COATED, EXTENDED RELEASE ORAL DAILY
Qty: 90 CAPSULE | Refills: 0 | Status: SHIPPED | OUTPATIENT
Start: 2021-01-11 | End: 2021-04-12 | Stop reason: SDUPTHER

## 2021-01-11 RX ORDER — LOSARTAN POTASSIUM 25 MG/1
25 TABLET ORAL DAILY
Qty: 90 TABLET | Refills: 0 | Status: SHIPPED | OUTPATIENT
Start: 2021-01-11 | End: 2021-04-12 | Stop reason: SDUPTHER

## 2021-01-24 ENCOUNTER — IMMUNIZATIONS (OUTPATIENT)
Dept: FAMILY MEDICINE CLINIC | Facility: HOSPITAL | Age: 61
End: 2021-01-24

## 2021-01-24 DIAGNOSIS — Z23 ENCOUNTER FOR IMMUNIZATION: Primary | ICD-10-CM

## 2021-01-24 PROCEDURE — 0012A SARS-COV-2 / COVID-19 MRNA VACCINE (MODERNA) 100 MCG: CPT

## 2021-01-24 PROCEDURE — 91301 SARS-COV-2 / COVID-19 MRNA VACCINE (MODERNA) 100 MCG: CPT

## 2021-04-12 DIAGNOSIS — I48.92 ATRIAL FLUTTER, UNSPECIFIED TYPE (HCC): ICD-10-CM

## 2021-04-12 DIAGNOSIS — E78.00 HYPERCHOLESTEROLEMIA: ICD-10-CM

## 2021-04-12 DIAGNOSIS — I10 ESSENTIAL HYPERTENSION: ICD-10-CM

## 2021-04-12 RX ORDER — ATORVASTATIN CALCIUM 10 MG/1
10 TABLET, FILM COATED ORAL DAILY
Qty: 90 TABLET | Refills: 2 | Status: SHIPPED | OUTPATIENT
Start: 2021-04-12 | End: 2022-01-03 | Stop reason: SDUPTHER

## 2021-04-12 RX ORDER — DILTIAZEM HYDROCHLORIDE 120 MG/1
120 CAPSULE, COATED, EXTENDED RELEASE ORAL DAILY
Qty: 90 CAPSULE | Refills: 2 | Status: SHIPPED | OUTPATIENT
Start: 2021-04-12 | End: 2022-01-03 | Stop reason: SDUPTHER

## 2021-04-12 RX ORDER — LOSARTAN POTASSIUM 25 MG/1
25 TABLET ORAL DAILY
Qty: 90 TABLET | Refills: 2 | Status: SHIPPED | OUTPATIENT
Start: 2021-04-12 | End: 2022-01-03 | Stop reason: SDUPTHER

## 2021-05-27 NOTE — PATIENT INSTRUCTIONS
Reviewed health history along with medications  He is doing very well  He does continue to see Cardiology yearly, saw them back in November, continues on Eliquis for stroke prevention, history of flutter  Blood pressure today is 134/78, continue losartan, diltiazem as is  His home blood pressure readings have been excellent, 120s/70s  Also continue Lipitor 10 mg daily as is  Cholesterol was excellent back in November at 148 with HDL 67, LDL 65  We did review other previous blood work, back in November CMP was unremarkable, urinalysis was clear  He is up to date with Diabetes screening  A1c in November was fine at 5 3, glucose 90  Last TSH was in 2018  Does need A1c/lipids to be run for insurance    Immunization History   Administered Date(s) Administered    INFLUENZA 10/05/2015, 10/01/2018, 10/19/2020    Influenza Quadrivalent Preservative Free 3 years and older IM 10/30/2014, 10/01/2016, 10/01/2018    Influenza Quadrivalent, 6-35 Months IM 10/20/2015    SARS-CoV-2 / COVID-19 mRNA IM ([facility]) 12/28/2020, 01/24/2021    Tdap 02/01/2015    Zoster Vaccine Recombinant 12/24/2019, 05/08/2020      Is a former smoker, quit 7 years ago -smoked 1 pack per day times 15 years- he did have CT scanning chest, abdomen, pelvis back in December of 2018, had atelectasis left base, also had 5 mm nodule lingula  Has no respiratory symptoms  Regarding Colon Cancer screening, he has had 3 colonoscopies in the past, the last was normal in 2015, consider re-doing colonoscopy 2022-25  Discussed Prostate Cancer screening pros/cons starting at age 48  PSA blood test  ordered  PSA was fine at 0 8 back in 2019  Does have history kidney stones, keep hydrated  Hepatitis C Screening indicated for 'baby boomers' -   previously perfomed and was negative  Continue to try to watch healthy diet, exercise routinely    He has dropped 12 lb since November, keep up the good work    We will see him again in 6 months, sooner as needed

## 2021-05-28 ENCOUNTER — OFFICE VISIT (OUTPATIENT)
Dept: FAMILY MEDICINE CLINIC | Facility: CLINIC | Age: 61
End: 2021-05-28
Payer: COMMERCIAL

## 2021-05-28 ENCOUNTER — APPOINTMENT (OUTPATIENT)
Dept: LAB | Facility: MEDICAL CENTER | Age: 61
End: 2021-05-28
Payer: COMMERCIAL

## 2021-05-28 VITALS
OXYGEN SATURATION: 97 % | TEMPERATURE: 97.6 F | BODY MASS INDEX: 28.28 KG/M2 | WEIGHT: 202 LBS | DIASTOLIC BLOOD PRESSURE: 78 MMHG | HEIGHT: 71 IN | HEART RATE: 69 BPM | SYSTOLIC BLOOD PRESSURE: 134 MMHG

## 2021-05-28 DIAGNOSIS — Z13.1 SCREENING FOR DIABETES MELLITUS: ICD-10-CM

## 2021-05-28 DIAGNOSIS — I10 ESSENTIAL HYPERTENSION: ICD-10-CM

## 2021-05-28 DIAGNOSIS — E78.00 HYPERCHOLESTEROLEMIA: ICD-10-CM

## 2021-05-28 DIAGNOSIS — I48.92 ATRIAL FLUTTER, UNSPECIFIED TYPE (HCC): ICD-10-CM

## 2021-05-28 DIAGNOSIS — Z79.01 ANTICOAGULATED: ICD-10-CM

## 2021-05-28 DIAGNOSIS — Z00.00 ENCOUNTER FOR ANNUAL PHYSICAL EXAM: Primary | ICD-10-CM

## 2021-05-28 DIAGNOSIS — Z12.5 SCREENING PSA (PROSTATE SPECIFIC ANTIGEN): ICD-10-CM

## 2021-05-28 LAB
ALBUMIN SERPL BCP-MCNC: 3.8 G/DL (ref 3.5–5)
ALP SERPL-CCNC: 79 U/L (ref 46–116)
ALT SERPL W P-5'-P-CCNC: 33 U/L (ref 12–78)
ANION GAP SERPL CALCULATED.3IONS-SCNC: 2 MMOL/L (ref 4–13)
AST SERPL W P-5'-P-CCNC: 18 U/L (ref 5–45)
BILIRUB SERPL-MCNC: 0.76 MG/DL (ref 0.2–1)
BUN SERPL-MCNC: 16 MG/DL (ref 5–25)
CALCIUM SERPL-MCNC: 9.1 MG/DL (ref 8.3–10.1)
CHLORIDE SERPL-SCNC: 108 MMOL/L (ref 100–108)
CHOLEST SERPL-MCNC: 143 MG/DL (ref 50–200)
CO2 SERPL-SCNC: 29 MMOL/L (ref 21–32)
CREAT SERPL-MCNC: 0.99 MG/DL (ref 0.6–1.3)
ERYTHROCYTE [DISTWIDTH] IN BLOOD BY AUTOMATED COUNT: 13 % (ref 11.6–15.1)
EST. AVERAGE GLUCOSE BLD GHB EST-MCNC: 108 MG/DL
GFR SERPL CREATININE-BSD FRML MDRD: 82 ML/MIN/1.73SQ M
GLUCOSE P FAST SERPL-MCNC: 93 MG/DL (ref 65–99)
HBA1C MFR BLD: 5.4 %
HCT VFR BLD AUTO: 44.2 % (ref 36.5–49.3)
HDLC SERPL-MCNC: 58 MG/DL
HGB BLD-MCNC: 15 G/DL (ref 12–17)
LDLC SERPL CALC-MCNC: 68 MG/DL (ref 0–100)
MCH RBC QN AUTO: 32.6 PG (ref 26.8–34.3)
MCHC RBC AUTO-ENTMCNC: 33.9 G/DL (ref 31.4–37.4)
MCV RBC AUTO: 96 FL (ref 82–98)
NONHDLC SERPL-MCNC: 85 MG/DL
PLATELET # BLD AUTO: 214 THOUSANDS/UL (ref 149–390)
PMV BLD AUTO: 10.1 FL (ref 8.9–12.7)
POTASSIUM SERPL-SCNC: 4.6 MMOL/L (ref 3.5–5.3)
PROT SERPL-MCNC: 7.5 G/DL (ref 6.4–8.2)
PSA SERPL-MCNC: 1 NG/ML (ref 0–4)
RBC # BLD AUTO: 4.6 MILLION/UL (ref 3.88–5.62)
SODIUM SERPL-SCNC: 139 MMOL/L (ref 136–145)
TRIGL SERPL-MCNC: 83 MG/DL
WBC # BLD AUTO: 6.68 THOUSAND/UL (ref 4.31–10.16)

## 2021-05-28 PROCEDURE — 99396 PREV VISIT EST AGE 40-64: CPT | Performed by: FAMILY MEDICINE

## 2021-05-28 PROCEDURE — 80053 COMPREHEN METABOLIC PANEL: CPT | Performed by: FAMILY MEDICINE

## 2021-05-28 PROCEDURE — 83036 HEMOGLOBIN GLYCOSYLATED A1C: CPT | Performed by: FAMILY MEDICINE

## 2021-05-28 PROCEDURE — 80061 LIPID PANEL: CPT | Performed by: FAMILY MEDICINE

## 2021-05-28 PROCEDURE — 85027 COMPLETE CBC AUTOMATED: CPT | Performed by: FAMILY MEDICINE

## 2021-05-28 PROCEDURE — G0103 PSA SCREENING: HCPCS

## 2021-05-28 PROCEDURE — 36415 COLL VENOUS BLD VENIPUNCTURE: CPT | Performed by: FAMILY MEDICINE

## 2021-05-28 NOTE — PROGRESS NOTES
BMI Counseling: Body mass index is 28 17 kg/m²  The BMI is above normal  Nutrition recommendations include encouraging healthy choices of fruits and vegetables and moderation in carbohydrate intake  Exercise recommendations include exercising 3-5 times per week  FAMILY PRACTICE OFFICE VISIT  [facility]  [address]  [address], [place], [address]      NAME: Tyrone Blackburn  AGE: 63 y o  SEX: male  : 1960   MRN: [id number]    DATE: 2021  TIME: 8:04 AM    Assessment and Plan     Problem List Items Addressed This Visit        Cardiovascular and Mediastinum    Essential hypertension    Relevant Orders    Comprehensive metabolic panel    Atrial flutter ([address] )    Relevant Orders    CBC       Other    Hypercholesterolemia    Relevant Orders    Lipid panel    Anticoagulated    Relevant Orders    CBC      Other Visit Diagnoses     Encounter for annual physical exam    -  Primary    Screening PSA (prostate specific antigen)        Relevant Orders    PSA, Total Screen    BMI 28 -  down 12 lbs vs Nov        Screening for diabetes mellitus        Relevant Orders    Hemoglobin A1C          Patient Instructions     Reviewed health history along with medications  He is doing very well  He does continue to see Cardiology yearly, saw them back in November, continues on Eliquis for stroke prevention, history of flutter  Blood pressure today is 134/78, continue losartan, diltiazem as is  His home blood pressure readings have been excellent, 120s/70s  Also continue Lipitor 10 mg daily as is  Cholesterol was excellent back in November at 148 with HDL 67, LDL 65  We did review other previous blood work, back in November CMP was unremarkable, urinalysis was clear  He is up to date with Diabetes screening  A1c in November was fine at 5 3, glucose 90  Last TSH was in 2018       Does need A1c/lipids to be run for insurance    Immunization History   Administered Date(s) Administered    INFLUENZA 10/05/2015, 10/01/2018, 10/19/2020    Influenza Quadrivalent Preservative Free 3 years and older IM 10/30/2014, 10/01/2016, 10/01/2018    Influenza Quadrivalent, 6-35 Months IM 10/20/2015    SARS-CoV-2 / COVID-19 mRNA IM ([facility] ) 12/28/2020, 01/24/2021    Tdap 02/01/2015    Zoster Vaccine Recombinant 12/24/2019, 05/08/2020      Is a former smoker, quit 7 years ago -smoked 1 pack per day times 15 years- he did have CT scanning chest, abdomen, pelvis back in December of 2018, had atelectasis left base, also had 5 mm nodule lingula  Has no respiratory symptoms  Regarding Colon Cancer screening, he has had 3 colonoscopies in the past, the last was normal in 2015, consider re-doing colonoscopy 2022-25  Discussed Prostate Cancer screening pros/cons starting at age 48  PSA blood test  ordered  PSA was fine at 0 8 back in 2019  Does have history kidney stones, keep hydrated  Hepatitis C Screening indicated for 'baby boomers' -   previously perfomed and was negative  Continue to try to watch healthy diet, exercise routinely  He has dropped 12 lb since November, keep up the good work    We will see him again in 6 months, sooner as needed  Chief Complaint     Chief Complaint   Patient presents with    Physical Exam       History of Present Illness   Sunday Harmon is a 63y o -year-old male who is in today for an annual physical/6 month check, he has been feeling very well, he has been watching his diet, he dropped 12 since November  Has been walking  He is using medication as directed including anticoagulation, he has no bleeding  He does see Cardiology yearly, has had no palpitations, history AFib      Review of Systems   Review of Systems   Constitutional: Negative for appetite change, fatigue, fever and unexpected weight change  HENT: Positive for congestion   Negative for sore throat and trouble swallowing  Respiratory: Negative for cough, chest tightness, shortness of breath and wheezing  Cardiovascular: Negative for chest pain, palpitations and leg swelling  Gastrointestinal: Negative for abdominal pain, blood in stool, nausea and vomiting  No acid reflux     No change in bowel   Genitourinary: Negative for dysuria and hematuria  Neurological: Negative for dizziness, syncope, light-headedness and headaches  Hematological: Does not bruise/bleed easily  Psychiatric/Behavioral: Negative for behavioral problems and confusion  Active Problem List     Patient Active Problem List   Diagnosis    Allergic rhinitis    Hypercholesterolemia    Essential hypertension    Migraine headache    Nasal polyp    BPH associated with nocturia    Kidney stone    Pulmonary nodule    Atrial flutter (HCC)    Anticoagulated    Former smoker       Past Medical History:  Reviewed    Past Surgical History:  Reviewed    Family History:  Reviewed    Social History:  Reviewed    Objective     Vitals:    05/28/21 0731   BP: 134/78   BP Location: Right arm   Patient Position: Sitting   Cuff Size: Standard   Pulse: 69   Temp: 97 6 °F (36 4 °C)   SpO2: 97%   Weight: 91 6 kg (202 lb)   Height: 5' 11" (1 803 m)     Body mass index is 28 17 kg/m²  BP Readings from Last 3 Encounters:   05/28/21 134/78   11/27/20 132/86   11/18/20 130/92       Wt Readings from Last 3 Encounters:   05/28/21 91 6 kg (202 lb)   11/27/20 97 1 kg (214 lb)   11/18/20 96 2 kg (212 lb)       Physical Exam  Constitutional:       General: He is not in acute distress  Appearance: Normal appearance  He is well-developed  He is not ill-appearing  Eyes:      General: No scleral icterus  Neck:      Vascular: No carotid bruit  Cardiovascular:      Rate and Rhythm: Normal rate and regular rhythm  Heart sounds: Normal heart sounds  No murmur  Pulmonary:      Effort: Pulmonary effort is normal  No respiratory distress  Breath sounds: Normal breath sounds  Abdominal:      Palpations: Abdomen is soft  Tenderness: There is no abdominal tenderness  Musculoskeletal:      Right lower leg: No edema  Left lower leg: No edema  Lymphadenopathy:      Cervical: No cervical adenopathy  Skin:     Coloration: Skin is not jaundiced  Neurological:      General: No focal deficit present  Mental Status: He is alert and oriented to person, place, and time  Psychiatric:         Mood and Affect: Mood normal          Behavior: Behavior normal          ALLERGIES:  Allergies   Allergen Reactions    Dust Mite Extract     Lisinopril Edema     Annotation - [id number]: lip swelling x 2 2015    Short Ragweed Pollen Ext        Current Medications     Current Outpatient Medications   Medication Sig Dispense Refill    apixaban (ELIQUIS) 5 mg Take 1 tablet (5 mg total) by mouth 2 (two) times a day 180 tablet 3    atorvastatin (LIPITOR) 10 mg tablet Take 1 tablet (10 mg total) by mouth daily 90 tablet 2    diltiazem (CARDIZEM CD) 120 mg 24 hr capsule Take 1 capsule (120 mg total) by mouth daily 90 capsule 2    fluticasone (FLONASE) 50 mcg/act nasal spray 2 sprays into each nostril daily      losartan (COZAAR) 25 mg tablet Take 1 tablet (25 mg total) by mouth daily 90 tablet 2    EPINEPHrine (EPIPEN) 0 3 mg/0 3 mL SOAJ Inject as directed       No current facility-administered medications for this visit               Orders Placed This Encounter   Procedures    CBC    Comprehensive metabolic panel    PSA, Total Screen    Hemoglobin A1C    Lipid panel         Delaney Sorenson DO

## 2021-09-07 ENCOUNTER — OFFICE VISIT (OUTPATIENT)
Dept: DERMATOLOGY | Facility: CLINIC | Age: 61
End: 2021-09-07
Payer: COMMERCIAL

## 2021-09-07 VITALS — WEIGHT: 205 LBS | HEIGHT: 72 IN | TEMPERATURE: 99 F | BODY MASS INDEX: 27.77 KG/M2

## 2021-09-07 DIAGNOSIS — L82.1 SEBORRHEIC KERATOSIS: ICD-10-CM

## 2021-09-07 DIAGNOSIS — D23.9 DERMATOFIBROMA: Primary | ICD-10-CM

## 2021-09-07 DIAGNOSIS — L72.0 EPIDERMAL CYST: ICD-10-CM

## 2021-09-07 PROCEDURE — 99213 OFFICE O/P EST LOW 20 MIN: CPT | Performed by: DERMATOLOGY

## 2021-09-07 NOTE — PROGRESS NOTES
[facility] Follow Up Note    Patient Name: Ekta Bird  Encounter Date: 09/07/2021    Today's Chief Concerns:  Nanette Moss Concern #1:  Skin exam      Current Medications:    Current Outpatient Medications:     apixaban (ELIQUIS) 5 mg, Take 1 tablet (5 mg total) by mouth 2 (two) times a day, Disp: 180 tablet, Rfl: 3    atorvastatin (LIPITOR) 10 mg tablet, Take 1 tablet (10 mg total) by mouth daily, Disp: 90 tablet, Rfl: 2    diltiazem (CARDIZEM CD) 120 mg 24 hr capsule, Take 1 capsule (120 mg total) by mouth daily, Disp: 90 capsule, Rfl: 2    EPINEPHrine (EPIPEN) 0 3 mg/0 3 mL SOAJ, Inject as directed, Disp: , Rfl:     fluticasone (FLONASE) 50 mcg/act nasal spray, 2 sprays into each nostril daily, Disp: , Rfl:     losartan (COZAAR) 25 mg tablet, Take 1 tablet (25 mg total) by mouth daily, Disp: 90 tablet, Rfl: 2    CONSTITUTIONAL:   Vitals:    09/07/21 1539   Temp: 99 °F (37 2 °C)   TempSrc: Tympanic   Weight: 93 kg (205 lb)   Height: 6' (1 829 m)       Specific Alerts:    Have you been seen by a [facility]atologist in the last 3 years? YES    Are you pregnant or planning to become pregnant? N/A    Are you currently or planning to be nursing or breast feeding? N/A    Allergies   Allergen Reactions    Dust Mite Extract     Lisinopril Edema     Annotation - [id number]: lip swelling x 2 2015    Short Ragweed Pollen Ext        May we call your Preferred Phone number to discuss your specific medical information? YES    May we leave a detailed message that includes your specific medical information? YES    Have you traveled outside of the [facility] in the past 3 months? No    Do you currently have a pacemaker or defibrillator? No    Do you have any artificial heart valves, joints, plates, screws, rods, stents, pins, etc? No   - If Yes, were any placed within the last 2 years? Do you require any medications prior to a surgical procedure? No   - If Yes, for which procedure? - If Yes, what medications to you require? Are you taking any medications that cause you to bleed more easily ("blood thinners") YES, Eliquis for atrial flutter    Have you ever experienced a rapid heartbeat with epinephrine? No    Have you ever been treated with "gold" (gold sodium thiomalate) therapy? No    [facility] can help with wrinkles, "laugh lines," facial volume loss, "double chin," "love handles," age spots, and more  Are you interested in learning today about some of the skin enhancement procedures that we offer? (If Yes, please provide more detail) No    Review of Systems:  Have you recently had or currently have any of the following?     · Fever or chills: No  · Night Sweats: No  · Headaches: No  · Weight Gain: No  · Weight Loss: No  · Blurry Vision: No  · Nausea: No  · Vomiting: No  · Diarrhea: No  · Blood in Stool: No  · Abdominal Pain: No  · Itchy Skin: No  · Painful Joints: No  · Swollen Joints: No  · Muscle Pain: No  · Irregular Mole: No  · Sun Burn: No  · Dry Skin: No  · Skin Color Changes: No  · Scar or Keloid: No  · Cold Sores/Fever Blisters: YES  · Bacterial Infections/MRSA: No  · Anxiety: No  · Depression: No  · Suicidal or Homicidal Thoughts: No      PSYCH: Normal mood and affect  EYES: Normal conjunctiva  ENT: Normal lips and oral mucosa  CARDIOVASCULAR: No edema  RESPIRATORY: Normal respirations  HEME/LYMPH/IMMUNO:  No regional lymphadenopathy except as noted below in ASSESSMENT AND PLAN BY DIAGNOSIS    FULL ORGAN SYSTEM SKIN EXAM (SKIN)   Hair, Scalp, Ears, Face Normal except as noted below in Assessment   Neck, Cervical Chain Nodes Normal except as noted below in Assessment   Right Arm/Hand/Fingers Normal except as noted below in Assessment   Left Arm/Hand/Fingers Normal except as noted below in Assessment   Chest/Breasts/Axillae Viewed areas Normal except as noted below in Assessment   Abdomen, Umbilicus Normal except as noted below in Assessment   Back/Spine Normal except as noted below in Assessment   Groin/Genitalia/Buttocks Viewed areas Normal except as noted below in Assessment   Right Leg, Foot, Toes Normal except as noted below in Assessment   Left Leg, Foot, Toes Normal except as noted below in Assessment       1  DERMATOFIBROMA    Physical Exam:   Anatomic Location Affected:  Left calf   Morphological Description:  Indurated nodule   Pertinent Positives:   Pertinent Negatives:No regional lymphadenopathy    Additional History of Present Condition:  Discovered on exam    Assessment and Plan:  Based on a thorough discussion of this condition and the management approach to it (including a comprehensive discussion of the known risks, side effects and potential benefits of treatment), the patient (family) agrees to implement the following specific plan:   Reassure benign    2  EPIDERMAL CYST    Physical Exam:   Anatomic Location Affected:  Mid upper back   Morphological Description:  1 cm with comedonal center nodule    Additional History of Present Condition:  Discovered on exam    Assessment and Plan:  Based on a thorough discussion of this condition and the management approach to it (including a comprehensive discussion of the known risks, side effects and potential benefits of treatment), the patient (family) agrees to implement the following specific plan:   Reassure benign    3  SEBORRHEIC KERATOSIS; NON-INFLAMED    Physical Exam:   Anatomic Location Affected:  Trunk and extremities   Morphological Description:  Flat and raised, waxy, smooth to warty textured, yellow to brownish-grey to dark brown to blackish, discrete, "stuck-on" appearing papules   Pertinent Positives:   Pertinent Negatives: Additional History of Present Condition:  Patient reports new bumps on the skin  Denies itch, burn, pain, bleeding or ulceration  Present constantly; nothing seems to make it worse or better  No prior treatment        Assessment and Plan:  Based on a thorough discussion of this condition and the management approach to it (including a comprehensive discussion of the known risks, side effects and potential benefits of treatment), the patient (family) agrees to implement the following specific plan:   Reassure benign    Seborrheic Keratosis  A seborrheic keratosis is a harmless warty spot that appears during adult life as a common sign of skin aging  Seborrheic keratoses can arise on any area of skin, covered or uncovered, with the usual exception of the palms and soles  They do not arise from mucous membranes  Seborrheic keratoses can have highly variable appearance  Seborrheic keratoses are extremely common  It has been estimated that over 90% of adults over the age of 61 years have one or more of them  They occur in males and females of all races, typically beginning to erupt in the 35s or 45s  They are uncommon under the age of 21 years  The precise cause of seborrhoeic keratoses is not known  Seborrhoeic keratoses are considered degenerative in nature  As time goes by, seborrheic keratoses tend to become more numerous  Some people inherit a tendency to develop a very large number of them; some people may have hundreds of them  The name "seborrheic keratosis" is misleading, because these lesions are not limited to a seborrhoeic distribution (scalp, mid-face, chest, upper back), nor are they formed from sebaceous glands, nor are they associated with sebum -- which is greasy    Seborrheic keratosis may also be called "SK," "Seb K," "basal cell papilloma," "senile wart," or "barnacle "      Researchers have noted:   Eruptive seborrhoeic keratoses can follow sunburn or dermatitis   Skin friction may be the reason they appear in body folds   Viral cause (e g , human papillomavirus) seems unlikely   Stable and clonal mutations or activation of FRFR3, PIK3CA, JIMY, AKT1 and EGFR genes are found in seborrhoeic keratoses   Seborrhoeic keratosis can arise from solar lentigo   FRFR3 mutations also arise in solar lentigines  These mutations are associated with increased age and location on the head and neck, suggesting a role of ultraviolet radiation in these lesions   Seborrheic keratoses do not harbour tumour suppressor gene mutations   Epidermal growth factor receptor inhibitors, which are used to treat some cancers, often result in an increase in verrucal (warty) keratoses  There is no easy way to remove multiple lesions on a single occasion  Unless a specific lesion is "inflamed" and is causing pain or stinging/burning or is bleeding, most insurance companies do not authorize treatment      Scribe Attestation    I,:  Alex Jimenes MA am acting as a scribe while in the presence of the attending physician :       I,:  Issa Ruiz MD personally performed the services described in this documentation    as scribed in my presence :

## 2021-09-07 NOTE — PATIENT INSTRUCTIONS
1  DERMATOFIBROMA    Physical Exam:   Anatomic Location Affected:  Left calf    Assessment and Plan:  Based on a thorough discussion of this condition and the management approach to it (including a comprehensive discussion of the known risks, side effects and potential benefits of treatment), the patient (family) agrees to implement the following specific plan:   Reassure benign    2  EPIDERMAL CYST    Physical Exam:   Anatomic Location Affected:  Mid upper back    Assessment and Plan:  Based on a thorough discussion of this condition and the management approach to it (including a comprehensive discussion of the known risks, side effects and potential benefits of treatment), the patient (family) agrees to implement the following specific plan:   Reassure benign    3  SEBORRHEIC KERATOSIS; NON-INFLAMED    Physical Exam:   Anatomic Location Affected:  Trunk and extremities      Assessment and Plan:  Based on a thorough discussion of this condition and the management approach to it (including a comprehensive discussion of the known risks, side effects and potential benefits of treatment), the patient (family) agrees to implement the following specific plan:   Reassure benign    Seborrheic Keratosis  A seborrheic keratosis is a harmless warty spot that appears during adult life as a common sign of skin aging  Seborrheic keratoses can arise on any area of skin, covered or uncovered, with the usual exception of the palms and soles  They do not arise from mucous membranes  Seborrheic keratoses can have highly variable appearance  Seborrheic keratoses are extremely common  It has been estimated that over 90% of adults over the age of 61 years have one or more of them  They occur in males and females of all races, typically beginning to erupt in the 35s or 45s  They are uncommon under the age of 21 years  The precise cause of seborrhoeic keratoses is not known    Seborrhoeic keratoses are considered degenerative in nature  As time goes by, seborrheic keratoses tend to become more numerous  Some people inherit a tendency to develop a very large number of them; some people may have hundreds of them  The name "seborrheic keratosis" is misleading, because these lesions are not limited to a seborrhoeic distribution (scalp, mid-face, chest, upper back), nor are they formed from sebaceous glands, nor are they associated with sebum -- which is greasy  Seborrheic keratosis may also be called "SK," "Seb K," "basal cell papilloma," "senile wart," or "barnacle "      Researchers have noted:   Eruptive seborrhoeic keratoses can follow sunburn or dermatitis   Skin friction may be the reason they appear in body folds   Viral cause (e g , human papillomavirus) seems unlikely   Stable and clonal mutations or activation of FRFR3, PIK3CA, JIMY, AKT1 and EGFR genes are found in seborrhoeic keratoses   Seborrhoeic keratosis can arise from solar lentigo   FRFR3 mutations also arise in solar lentigines  These mutations are associated with increased age and location on the head and neck, suggesting a role of ultraviolet radiation in these lesions   Seborrheic keratoses do not harbour tumour suppressor gene mutations   Epidermal growth factor receptor inhibitors, which are used to treat some cancers, often result in an increase in verrucal (warty) keratoses  There is no easy way to remove multiple lesions on a single occasion  Unless a specific lesion is "inflamed" and is causing pain or stinging/burning or is bleeding, most insurance companies do not authorize treatment

## 2021-10-10 DIAGNOSIS — I48.92 ATRIAL FLUTTER, UNSPECIFIED TYPE (HCC): ICD-10-CM

## 2021-10-11 RX ORDER — APIXABAN 5 MG/1
TABLET, FILM COATED ORAL
Qty: 180 TABLET | Refills: 0 | Status: SHIPPED | OUTPATIENT
Start: 2021-10-11 | End: 2022-01-03 | Stop reason: SDUPTHER

## 2021-10-19 ENCOUNTER — OFFICE VISIT (OUTPATIENT)
Dept: FAMILY MEDICINE CLINIC | Facility: CLINIC | Age: 61
End: 2021-10-19
Payer: COMMERCIAL

## 2021-10-19 VITALS
SYSTOLIC BLOOD PRESSURE: 150 MMHG | DIASTOLIC BLOOD PRESSURE: 90 MMHG | HEART RATE: 73 BPM | RESPIRATION RATE: 20 BRPM | TEMPERATURE: 98.2 F | BODY MASS INDEX: 28.61 KG/M2 | HEIGHT: 72 IN | WEIGHT: 211.2 LBS | OXYGEN SATURATION: 98 %

## 2021-10-19 DIAGNOSIS — R35.0 FREQUENT URINATION: ICD-10-CM

## 2021-10-19 DIAGNOSIS — N34.2 URETHRITIS: Primary | ICD-10-CM

## 2021-10-19 LAB
SL AMB  POCT GLUCOSE, UA: ABNORMAL
SL AMB LEUKOCYTE ESTERASE,UA: ABNORMAL
SL AMB POCT BILIRUBIN,UA: ABNORMAL
SL AMB POCT BLOOD,UA: ABNORMAL
SL AMB POCT CLARITY,UA: CLEAR
SL AMB POCT COLOR,UA: CLEAR
SL AMB POCT KETONES,UA: ABNORMAL
SL AMB POCT NITRITE,UA: ABNORMAL
SL AMB POCT PH,UA: 6.5
SL AMB POCT SPECIFIC GRAVITY,UA: 1000
SL AMB POCT URINE PROTEIN: ABNORMAL
SL AMB POCT UROBILINOGEN: 0.2

## 2021-10-19 PROCEDURE — 99213 OFFICE O/P EST LOW 20 MIN: CPT | Performed by: FAMILY MEDICINE

## 2021-10-19 PROCEDURE — 81002 URINALYSIS NONAUTO W/O SCOPE: CPT | Performed by: FAMILY MEDICINE

## 2021-10-19 PROCEDURE — 87086 URINE CULTURE/COLONY COUNT: CPT | Performed by: FAMILY MEDICINE

## 2021-10-19 PROCEDURE — 87491 CHLMYD TRACH DNA AMP PROBE: CPT | Performed by: FAMILY MEDICINE

## 2021-10-19 PROCEDURE — 87591 N.GONORRHOEAE DNA AMP PROB: CPT | Performed by: FAMILY MEDICINE

## 2021-10-19 RX ORDER — DOXYCYCLINE HYCLATE 100 MG/1
100 CAPSULE ORAL EVERY 12 HOURS SCHEDULED
Qty: 14 CAPSULE | Refills: 0 | Status: SHIPPED | OUTPATIENT
Start: 2021-10-19 | End: 2021-10-26

## 2021-10-19 RX ORDER — PREDNISOLONE ACETATE 10 MG/ML
SUSPENSION/ DROPS OPHTHALMIC
COMMUNITY
Start: 2021-08-25 | End: 2022-03-23 | Stop reason: ALTCHOICE

## 2021-10-20 LAB
C TRACH DNA SPEC QL NAA+PROBE: NEGATIVE
N GONORRHOEA DNA SPEC QL NAA+PROBE: NEGATIVE

## 2021-10-21 LAB — BACTERIA UR CULT: NORMAL

## 2021-11-29 ENCOUNTER — OFFICE VISIT (OUTPATIENT)
Dept: FAMILY MEDICINE CLINIC | Facility: CLINIC | Age: 61
End: 2021-11-29
Payer: COMMERCIAL

## 2021-11-29 VITALS
HEART RATE: 79 BPM | DIASTOLIC BLOOD PRESSURE: 80 MMHG | BODY MASS INDEX: 28.58 KG/M2 | OXYGEN SATURATION: 98 % | WEIGHT: 211 LBS | SYSTOLIC BLOOD PRESSURE: 144 MMHG | HEIGHT: 72 IN | TEMPERATURE: 97.8 F

## 2021-11-29 DIAGNOSIS — N41.0 ACUTE PROSTATITIS: Primary | ICD-10-CM

## 2021-11-29 DIAGNOSIS — R35.0 URINARY FREQUENCY: ICD-10-CM

## 2021-11-29 DIAGNOSIS — R10.30 LOWER ABDOMINAL PAIN: ICD-10-CM

## 2021-11-29 DIAGNOSIS — N20.0 KIDNEY STONE: ICD-10-CM

## 2021-11-29 PROCEDURE — 99214 OFFICE O/P EST MOD 30 MIN: CPT | Performed by: FAMILY MEDICINE

## 2021-11-29 RX ORDER — CIPROFLOXACIN 500 MG/1
500 TABLET, FILM COATED ORAL EVERY 12 HOURS SCHEDULED
Qty: 20 TABLET | Refills: 0 | Status: SHIPPED | OUTPATIENT
Start: 2021-11-29 | End: 2021-12-09

## 2022-01-03 ENCOUNTER — OFFICE VISIT (OUTPATIENT)
Dept: CARDIOLOGY CLINIC | Facility: CLINIC | Age: 62
End: 2022-01-03
Payer: COMMERCIAL

## 2022-01-03 VITALS
SYSTOLIC BLOOD PRESSURE: 138 MMHG | WEIGHT: 210.5 LBS | DIASTOLIC BLOOD PRESSURE: 86 MMHG | HEART RATE: 83 BPM | HEIGHT: 72 IN | BODY MASS INDEX: 28.51 KG/M2

## 2022-01-03 DIAGNOSIS — I10 ESSENTIAL HYPERTENSION: ICD-10-CM

## 2022-01-03 DIAGNOSIS — I48.92 ATRIAL FLUTTER, UNSPECIFIED TYPE (HCC): Primary | ICD-10-CM

## 2022-01-03 DIAGNOSIS — E78.00 HYPERCHOLESTEROLEMIA: ICD-10-CM

## 2022-01-03 DIAGNOSIS — Z79.01 ANTICOAGULATED: ICD-10-CM

## 2022-01-03 DIAGNOSIS — R00.2 PALPITATIONS: ICD-10-CM

## 2022-01-03 DIAGNOSIS — I48.92 ATRIAL FLUTTER, UNSPECIFIED TYPE (HCC): ICD-10-CM

## 2022-01-03 PROCEDURE — 99214 OFFICE O/P EST MOD 30 MIN: CPT | Performed by: INTERNAL MEDICINE

## 2022-01-03 PROCEDURE — 93000 ELECTROCARDIOGRAM COMPLETE: CPT | Performed by: INTERNAL MEDICINE

## 2022-01-03 RX ORDER — ATORVASTATIN CALCIUM 10 MG/1
10 TABLET, FILM COATED ORAL DAILY
Qty: 90 TABLET | Refills: 3 | Status: SHIPPED | OUTPATIENT
Start: 2022-01-03

## 2022-01-03 RX ORDER — DILTIAZEM HYDROCHLORIDE 120 MG/1
120 CAPSULE, COATED, EXTENDED RELEASE ORAL DAILY
Qty: 90 CAPSULE | Refills: 3 | Status: SHIPPED | OUTPATIENT
Start: 2022-01-03 | End: 2022-03-11 | Stop reason: SDUPTHER

## 2022-01-03 RX ORDER — LOSARTAN POTASSIUM 25 MG/1
25 TABLET ORAL DAILY
Qty: 90 TABLET | Refills: 3 | Status: SHIPPED | OUTPATIENT
Start: 2022-01-03

## 2022-01-03 NOTE — PROGRESS NOTES
EPS Progress Note - Donna Lazo 57 y o  male MRN: [id number]           ASSESSMENT:  1  Atrial flutter, unspecified type ([address] )  POCT ECG   2  Essential hypertension     3  Hypercholesterolemia     4  Anticoagulated     5  Palpitations             PLAN:  History of paroxysmal atrial flutter  Cannot rule out underlying atrial fibrillation  He has had no recurrence from a symptom standpoint and he is appropriately anticoagulated  He is on appropriate medical therapy and would recommend continuing this  In terms of palpitations if happens at frequency of every 2 weeks or the episodes last longer then recommend monitoring  He will follow-up in one year    HPI:   Interim history Occasional palpitations about once a month last a few minutes  No lightheadeness, dizziness or pre-syncope  Sitting down when feeling them  he is doing well he offers no complaints no palpitations no chest pain no shortness of breath no lightheadedness all other 12 point ROS negative          ROS:  As above all other ROS negative       Objective:     Vitals: Blood pressure 138/86, pulse 83, height 6' (1 829 m), weight 95 5 kg (210 lb 8 oz)  , Body mass index is 28 55 kg/m²  ,        Physical Exam:    GEN: Donna Lazo appears well, alert and oriented x 3, pleasant and cooperative   HEENT: pupils equal, round, and reactive to light; extraocular muscles intact  NECK: supple, no carotid bruits   HEART: regular rhythm, normal S1 and S2, no murmurs, clicks, gallops or rubs   LUNGS: clear to auscultation bilaterally; no wheezes, rales, or rhonchi   ABDOMEN: normal bowel sounds, soft, no tenderness, no distention  EXTREMITIES: peripheral pulses normal; no clubbing, cyanosis, or edema  NEURO: no focal findings   SKIN: normal without suspicious lesions on exposed skin    Medications:      Current Outpatient Medications:     EPINEPHrine (EPIPEN) 0 3 mg/0 3 mL SOAJ, Inject as directed, Disp: , Rfl:     fluticasone (FLONASE) 50 mcg/act nasal spray, 2 sprays into each nostril daily, Disp: , Rfl:     apixaban (Eliquis) 5 mg, Take 1 tablet (5 mg total) by mouth 2 (two) times a day, Disp: 180 tablet, Rfl: 3    atorvastatin (LIPITOR) 10 mg tablet, Take 1 tablet (10 mg total) by mouth daily, Disp: 90 tablet, Rfl: 3    diltiazem (CARDIZEM CD) 120 mg 24 hr capsule, Take 1 capsule (120 mg total) by mouth daily, Disp: 90 capsule, Rfl: 3    losartan (COZAAR) 25 mg tablet, Take 1 tablet (25 mg total) by mouth daily, Disp: 90 tablet, Rfl: 3    prednisoLONE acetate (PRED FORTE) 1 % ophthalmic suspension, , Disp: , Rfl:      Family History   Problem Relation Age of Onset    Other Mother         Sarcoma    Hypertension Father      Social History     Socioeconomic History    Marital status: Single     Spouse name: Not on file    Number of children: Not on file    Years of education: Not on file    Highest education level: Not on file   Occupational History    Occupation: [employer], dispatch     Employer: [employer]   Tobacco Use    Smoking status: Former Smoker     Packs/day: 1 00     Years: 15 00     Pack years: 15 00     Types: Cigarettes     Quit date: 2014     Years since quittin 0    Smokeless tobacco: Never Used   Vaping Use    Vaping Use: Never used   Substance and Sexual Activity    Alcohol use:  Yes     Alcohol/week: 7 0 standard drinks     Types: 7 Glasses of wine per week     Comment: 1 glass of wine daily    Drug use: No    Sexual activity: Not on file   Other Topics Concern    Not on file   Social History Narrative    Recreational activities:  Walking     Social Determinants of Health     Financial Resource Strain: Not on file   Food Insecurity: Not on file   Transportation Needs: Not on file   Physical Activity: Not on file   Stress: Not on file   Social Connections: Not on file   Intimate Partner Violence: Not on file   Housing Stability: Not on file     Social History     Tobacco Use   Smoking Status Former Smoker    Packs/day: 1 00    Years: 15 00    Pack years: 15 00    Types: Cigarettes    Quit date: 2014    Years since quittin 0   Smokeless Tobacco Never Used     Social History     Substance and Sexual Activity   Alcohol Use Yes    Alcohol/week: 7 0 standard drinks    Types: 7 Glasses of wine per week    Comment: 1 glass of wine daily       Labs & Results:  Below is the patient's most recent value for Albumin, ALT, AST, BUN, Calcium, Chloride, Cholesterol, CO2, Creatinine, GFR, Glucose, HDL, Hematocrit, Hemoglobin, Hemoglobin A1C, LDL, Magnesium, Phosphorus, Platelets, Potassium, PSA, Sodium, Triglycerides, and WBC  Lab Results   Component Value Date    ALT 33 2021    AST 18 2021    BUN 16 2021    CALCIUM 9 1 2021     2021    CHOL 154 2015    CO2 29 2021    CREATININE 0 99 2021    HDL 58 2021    HCT 44 2 2021    HGB 15 0 2021    HGBA1C 5 4 2021    MG 1 9 2019     2021    K 4 6 2021    PSA 1 0 2021     2015    TRIG 83 2021    WBC 6 68 2021     Note: for a comprehensive list of the patient's lab results, access the Results Review activity              Cardiac testing:   Results for orders placed during the hospital encounter of 19   Echo complete with contrast if indicated    Narrative [address]  [address] [facility]  [address], [address] [facility]  [phone]    Transthoracic Echocardiogram  2D, M-mode, Doppler, and Color Doppler    Study date:  2019    Patient: Adelia Hill  MR number: [id number]  Account number: [de-identified]  : 1960  Age: 60 years  Gender: Male  Status: Outpatient  [facility] [address] [place] [address] [facility]  Height: 73 in  Weight: 202 lb  BP: 139/ 79 mmHg    Indications: Atrial flutter    Diagnoses: I48 1 - Atrial flutter    Sonographer:  LAZARO Monteiro  Interpreting Physician:  DO Berlin Grijalva Physician:  Krishna Michaud DO  Referring Physician:  Joce Nunez DO  Group:  [facility]  cc:  Denise Bourgeois DO  Cardiology Fellow:  Rupali Gaitan MD    SUMMARY    LEFT VENTRICLE:  Systolic function was normal  Ejection fraction was estimated to be 55 %  There were no regional wall motion abnormalities  RIGHT VENTRICLE:  The size was normal   Systolic function was normal     HISTORY: PRIOR HISTORY: Hypertension    PROCEDURE: The study was performed in the [address]  This was a routine study  The transthoracic approach was used  The study included complete 2D imaging, M-mode, complete spectral Doppler, and color Doppler  The  heart rate was 68 bpm, at the start of the study  Images were obtained from the parasternal, apical, subcostal, and suprasternal notch acoustic windows  Echocardiographic views were limited due to poor acoustic window availability  Image  quality was adequate  LEFT VENTRICLE: Size was normal  Systolic function was normal  Ejection fraction was estimated to be 55 %  There were no regional wall motion abnormalities  Wall thickness was normal  DOPPLER: Left ventricular diastolic function parameters  were normal for the patient's age  RIGHT VENTRICLE: The size was normal  Systolic function was normal  Wall thickness was normal     LEFT ATRIUM: Size was normal     RIGHT ATRIUM: Size was normal     MITRAL VALVE: Valve structure was normal  There was normal leaflet separation  DOPPLER: The transmitral velocity was within the normal range  There was no evidence for stenosis  There was trace regurgitation  AORTIC VALVE: The valve was trileaflet  Leaflets exhibited mildly increased thickness, mild calcification, and normal cuspal separation  DOPPLER: Transaortic velocity was within the normal range  There was no evidence for stenosis  There  was no significant regurgitation      TRICUSPID VALVE: The valve structure was normal  There was normal leaflet separation  DOPPLER: The transtricuspid velocity was within the normal range  There was no evidence for stenosis  There was no significant regurgitation  PULMONIC VALVE: Leaflets exhibited normal thickness, no calcification, and normal cuspal separation  DOPPLER: The transpulmonic velocity was within the normal range  There was no significant regurgitation  PERICARDIUM: There was no pericardial effusion  AORTA: The root exhibited normal size  SYSTEMIC VEINS: IVC: The inferior vena cava was normal in size  SYSTEM MEASUREMENT TABLES    2D  %FS: 30 2 %  Ao Diam: 3 29 cm  EDV(Teich): 120 65 ml  EF(Cube): 65 99 %  EF(Teich): 57 24 %  ESV(Cube): 43 64 ml  ESV(Teich): 51 59 ml  IVSd: 0 88 cm  LA Area: 17 26 cm2  LA Diam: 3 48 cm  LVEDV MOD A4C: 109 23 ml  LVEF MOD A4C: 49 2 %  LVESV MOD A4C: 55 5 ml  LVIDd: 5 04 cm  LVIDs: 3 52 cm  LVLd A4C: 7 81 cm  LVLs A4C: 6 85 cm  LVPWd: 0 97 cm  RA Area: 13 92 cm2  RV Diam : 3 75 cm  SV MOD A4C: 53 74 ml  SV(Cube): 84 66 ml  SV(Teich): 69 06 ml    CW  TR Vmax: 2 18 m/s  TR maxP 08 mmHg    MM  TAPSE: 1 98 cm    PW  E': 0 06 m/s  E/E': 8 6  MV A Moses: 0 51 m/s  MV Dec [place]: 1 8 m/s2  MV DecT: 281 82 ms  MV E Moses: 0 51 m/s  MV E/A Ratio: 1    [facility] Accredited Echocardiography Laboratory    Prepared and electronically signed by    Fide Mendenhall DO  Signed 2019 10:03:56       No results found for this or any previous visit  No results found for this or any previous visit  No results found for this or any previous visit

## 2022-02-14 ENCOUNTER — TELEPHONE (OUTPATIENT)
Dept: CARDIOLOGY CLINIC | Facility: CLINIC | Age: 62
End: 2022-02-14

## 2022-02-14 DIAGNOSIS — R00.2 PALPITATIONS: Primary | ICD-10-CM

## 2022-02-14 NOTE — TELEPHONE ENCOUNTER
Pt called, stated that he has been having a couple of episodes of tachycardia recently  First one was on 02/05, he had an episode that lasted for 30mins with a HR of 156 bpm  He wasn't doing anything strenuous, he was just sitting in a chair at rest  Thought he may have been dehydrated, drank 32oz of water and rested until episode ended  Two more episodes occurred yesterday both lasting no more than 5 mins each with HR about 152 bpm each time  He would like to proceed with the event monitor as discussed during last office visit with Dr Cristobal Faria  Order placed  Amber Romero, Can you auth for 2 week Zio XT, please? Thank you

## 2022-02-15 ENCOUNTER — CLINICAL SUPPORT (OUTPATIENT)
Dept: CARDIOLOGY CLINIC | Facility: CLINIC | Age: 62
End: 2022-02-15
Payer: COMMERCIAL

## 2022-02-15 DIAGNOSIS — I48.92 ATRIAL FLUTTER, UNSPECIFIED TYPE (HCC): ICD-10-CM

## 2022-02-15 PROCEDURE — 93246 EXT ECG>7D<15D RECORDING: CPT | Performed by: INTERNAL MEDICINE

## 2022-02-15 NOTE — PROGRESS NOTES
Tyrone Blackburn is here today under the direction of Dr George Kowalski  Patch applied and all instructions for use given to patient in office

## 2022-03-08 ENCOUNTER — CLINICAL SUPPORT (OUTPATIENT)
Dept: CARDIOLOGY CLINIC | Facility: CLINIC | Age: 62
End: 2022-03-08
Payer: COMMERCIAL

## 2022-03-08 DIAGNOSIS — R00.2 PALPITATIONS: ICD-10-CM

## 2022-03-08 PROCEDURE — 93248 EXT ECG>7D<15D REV&INTERPJ: CPT | Performed by: INTERNAL MEDICINE

## 2022-03-10 ENCOUNTER — TELEPHONE (OUTPATIENT)
Dept: CARDIOLOGY CLINIC | Facility: CLINIC | Age: 62
End: 2022-03-10

## 2022-03-11 NOTE — TELEPHONE ENCOUNTER
Discussed with patient findings on monitor which shows episodes of atrial flutter and atrial fibrillation paroxysmal   He is very symptomatic when he has these episodes  I explained that he has atrial flutter and atrial fibrillation my recommendation would be either up titration of his AV carmen blocking agents or catheter ablation of atrial fibrillation and atrial flutter  We did discuss the risks benefits and alternatives of the two options I would not recommend antiarrhythmic given that he has already predispose to atrial flutter      He would like to proceed with catheter ablation her arrangements will be made he has already had a CT scan of his heart we will hold Eliquis the morning of the procedure

## 2022-03-14 ENCOUNTER — TELEPHONE (OUTPATIENT)
Dept: CARDIOLOGY CLINIC | Facility: CLINIC | Age: 62
End: 2022-03-14

## 2022-03-14 DIAGNOSIS — I48.92 ATRIAL FLUTTER, UNSPECIFIED TYPE (HCC): Primary | ICD-10-CM

## 2022-03-14 NOTE — TELEPHONE ENCOUNTER
Patient scheduled for SIMRAN/A fib/flutter ablation at [facility] on 03/23/22 with Dr Cristobal Faria  Patient aware of general instructions, meds holds and labs required  Can we please have insurance check

## 2022-03-14 NOTE — TELEPHONE ENCOUNTER
----- Message from Leonor Samuel DO sent at 3/11/2022  1:09 PM EST -----  Please arrange transesophageal echocardiogram with atrial flutter and atrial fibrillation ablation cryo and Abbott      Hold Eliquis morning of procedure    He does not need a CT scan he has already had one    Thank you

## 2022-03-15 ENCOUNTER — APPOINTMENT (OUTPATIENT)
Dept: LAB | Facility: HOSPITAL | Age: 62
End: 2022-03-15
Payer: COMMERCIAL

## 2022-03-15 LAB
ALBUMIN SERPL BCP-MCNC: 3.6 G/DL (ref 3.5–5)
ALP SERPL-CCNC: 87 U/L (ref 46–116)
ALT SERPL W P-5'-P-CCNC: 33 U/L (ref 12–78)
ANION GAP SERPL CALCULATED.3IONS-SCNC: 2 MMOL/L (ref 4–13)
AST SERPL W P-5'-P-CCNC: 18 U/L (ref 5–45)
BASOPHILS # BLD AUTO: 0.05 THOUSANDS/ΜL (ref 0–0.1)
BASOPHILS NFR BLD AUTO: 1 % (ref 0–1)
BILIRUB SERPL-MCNC: 0.62 MG/DL (ref 0.2–1)
BUN SERPL-MCNC: 12 MG/DL (ref 5–25)
CALCIUM SERPL-MCNC: 8.9 MG/DL (ref 8.3–10.1)
CHLORIDE SERPL-SCNC: 107 MMOL/L (ref 100–108)
CO2 SERPL-SCNC: 29 MMOL/L (ref 21–32)
CREAT SERPL-MCNC: 1.04 MG/DL (ref 0.6–1.3)
EOSINOPHIL # BLD AUTO: 0.22 THOUSAND/ΜL (ref 0–0.61)
EOSINOPHIL NFR BLD AUTO: 3 % (ref 0–6)
ERYTHROCYTE [DISTWIDTH] IN BLOOD BY AUTOMATED COUNT: 13.2 % (ref 11.6–15.1)
GFR SERPL CREATININE-BSD FRML MDRD: 76 ML/MIN/1.73SQ M
GLUCOSE P FAST SERPL-MCNC: 104 MG/DL (ref 65–99)
HCT VFR BLD AUTO: 41 % (ref 36.5–49.3)
HGB BLD-MCNC: 14.1 G/DL (ref 12–17)
IMM GRANULOCYTES # BLD AUTO: 0.01 THOUSAND/UL (ref 0–0.2)
IMM GRANULOCYTES NFR BLD AUTO: 0 % (ref 0–2)
LYMPHOCYTES # BLD AUTO: 2.55 THOUSANDS/ΜL (ref 0.6–4.47)
LYMPHOCYTES NFR BLD AUTO: 36 % (ref 14–44)
MCH RBC QN AUTO: 32.3 PG (ref 26.8–34.3)
MCHC RBC AUTO-ENTMCNC: 34.4 G/DL (ref 31.4–37.4)
MCV RBC AUTO: 94 FL (ref 82–98)
MONOCYTES # BLD AUTO: 0.58 THOUSAND/ΜL (ref 0.17–1.22)
MONOCYTES NFR BLD AUTO: 8 % (ref 4–12)
NEUTROPHILS # BLD AUTO: 3.69 THOUSANDS/ΜL (ref 1.85–7.62)
NEUTS SEG NFR BLD AUTO: 52 % (ref 43–75)
NRBC BLD AUTO-RTO: 0 /100 WBCS
PLATELET # BLD AUTO: 228 THOUSANDS/UL (ref 149–390)
PMV BLD AUTO: 9.4 FL (ref 8.9–12.7)
POTASSIUM SERPL-SCNC: 4.4 MMOL/L (ref 3.5–5.3)
PROT SERPL-MCNC: 7.3 G/DL (ref 6.4–8.2)
RBC # BLD AUTO: 4.37 MILLION/UL (ref 3.88–5.62)
SODIUM SERPL-SCNC: 138 MMOL/L (ref 136–145)
WBC # BLD AUTO: 7.1 THOUSAND/UL (ref 4.31–10.16)

## 2022-03-15 PROCEDURE — 80053 COMPREHEN METABOLIC PANEL: CPT

## 2022-03-15 PROCEDURE — 36415 COLL VENOUS BLD VENIPUNCTURE: CPT

## 2022-03-15 PROCEDURE — 85025 COMPLETE CBC W/AUTO DIFF WBC: CPT

## 2022-03-18 ENCOUNTER — DOCUMENTATION (OUTPATIENT)
Dept: CARDIOLOGY CLINIC | Facility: CLINIC | Age: 62
End: 2022-03-18

## 2022-03-18 ENCOUNTER — TELEPHONE (OUTPATIENT)
Dept: CARDIOLOGY CLINIC | Facility: CLINIC | Age: 62
End: 2022-03-18

## 2022-03-18 NOTE — PROGRESS NOTES
Received forms via email  Completed and signed  Faxed to Jorden [phone] and faxed to Roger Sudhir Taylor at [phone]

## 2022-03-23 ENCOUNTER — HOSPITAL ENCOUNTER (OUTPATIENT)
Dept: NON INVASIVE DIAGNOSTICS | Facility: HOSPITAL | Age: 62
Discharge: HOME | End: 2022-03-23
Attending: INTERNAL MEDICINE
Payer: COMMERCIAL

## 2022-03-23 ENCOUNTER — ANESTHESIA (OUTPATIENT)
Dept: NON INVASIVE DIAGNOSTICS | Facility: HOSPITAL | Age: 62
End: 2022-03-23
Payer: COMMERCIAL

## 2022-03-23 ENCOUNTER — HOSPITAL ENCOUNTER (OUTPATIENT)
Facility: HOSPITAL | Age: 62
Setting detail: OUTPATIENT SURGERY
Discharge: HOME | End: 2022-03-24
Attending: INTERNAL MEDICINE | Admitting: INTERNAL MEDICINE
Payer: COMMERCIAL

## 2022-03-23 ENCOUNTER — ANESTHESIA EVENT (OUTPATIENT)
Dept: NON INVASIVE DIAGNOSTICS | Facility: HOSPITAL | Age: 62
End: 2022-03-23
Payer: COMMERCIAL

## 2022-03-23 VITALS
RESPIRATION RATE: 20 BRPM | DIASTOLIC BLOOD PRESSURE: 83 MMHG | TEMPERATURE: 98.2 F | OXYGEN SATURATION: 99 % | SYSTOLIC BLOOD PRESSURE: 149 MMHG | HEART RATE: 82 BPM

## 2022-03-23 DIAGNOSIS — I48.92 ATRIAL FLUTTER, UNSPECIFIED TYPE (HCC): ICD-10-CM

## 2022-03-23 DIAGNOSIS — R00.2 PALPITATIONS: ICD-10-CM

## 2022-03-23 PROBLEM — R06.00 DOE (DYSPNEA ON EXERTION): Status: ACTIVE | Noted: 2022-03-23

## 2022-03-23 PROBLEM — R06.09 DOE (DYSPNEA ON EXERTION): Status: ACTIVE | Noted: 2022-03-23

## 2022-03-23 LAB
ANION GAP SERPL CALCULATED.3IONS-SCNC: 4 MMOL/L (ref 4–13)
APTT PPP: 30 SECONDS (ref 23–37)
ATRIAL RATE: 86 BPM
BASOPHILS # BLD AUTO: 0.04 THOUSANDS/ΜL (ref 0–0.1)
BASOPHILS NFR BLD AUTO: 1 % (ref 0–1)
BUN SERPL-MCNC: 17 MG/DL (ref 5–25)
CALCIUM SERPL-MCNC: 8.9 MG/DL (ref 8.3–10.1)
CHLORIDE SERPL-SCNC: 111 MMOL/L (ref 100–108)
CO2 SERPL-SCNC: 24 MMOL/L (ref 21–32)
CREAT SERPL-MCNC: 0.96 MG/DL (ref 0.6–1.3)
EOSINOPHIL # BLD AUTO: 0.23 THOUSAND/ΜL (ref 0–0.61)
EOSINOPHIL NFR BLD AUTO: 3 % (ref 0–6)
ERYTHROCYTE [DISTWIDTH] IN BLOOD BY AUTOMATED COUNT: 13.1 % (ref 11.6–15.1)
FLUAV RNA RESP QL NAA+PROBE: NEGATIVE
FLUBV RNA RESP QL NAA+PROBE: NEGATIVE
GFR SERPL CREATININE-BSD FRML MDRD: 84 ML/MIN/1.73SQ M
GLUCOSE P FAST SERPL-MCNC: 103 MG/DL (ref 65–99)
GLUCOSE SERPL-MCNC: 103 MG/DL (ref 65–140)
HCT VFR BLD AUTO: 40.8 % (ref 36.5–49.3)
HGB BLD-MCNC: 14.2 G/DL (ref 12–17)
IMM GRANULOCYTES # BLD AUTO: 0.02 THOUSAND/UL (ref 0–0.2)
IMM GRANULOCYTES NFR BLD AUTO: 0 % (ref 0–2)
INR PPP: 1.05 (ref 0.84–1.19)
KCT BLD-ACNC: 292 SEC (ref 89–137)
KCT BLD-ACNC: 313 SEC (ref 89–137)
KCT BLD-ACNC: 341 SEC (ref 89–137)
KCT BLD-ACNC: 356 SEC (ref 89–137)
LYMPHOCYTES # BLD AUTO: 2.31 THOUSANDS/ΜL (ref 0.6–4.47)
LYMPHOCYTES NFR BLD AUTO: 35 % (ref 14–44)
MCH RBC QN AUTO: 32.6 PG (ref 26.8–34.3)
MCHC RBC AUTO-ENTMCNC: 34.8 G/DL (ref 31.4–37.4)
MCV RBC AUTO: 94 FL (ref 82–98)
MONOCYTES # BLD AUTO: 0.53 THOUSAND/ΜL (ref 0.17–1.22)
MONOCYTES NFR BLD AUTO: 8 % (ref 4–12)
NEUTROPHILS # BLD AUTO: 3.57 THOUSANDS/ΜL (ref 1.85–7.62)
NEUTS SEG NFR BLD AUTO: 53 % (ref 43–75)
NRBC BLD AUTO-RTO: 0 /100 WBCS
P AXIS: 62 DEGREES
PLATELET # BLD AUTO: 236 THOUSANDS/UL (ref 149–390)
PMV BLD AUTO: 9.3 FL (ref 8.9–12.7)
POTASSIUM SERPL-SCNC: 4.2 MMOL/L (ref 3.5–5.3)
PR INTERVAL: 138 MS
PROTHROMBIN TIME: 13.3 SECONDS (ref 11.6–14.5)
QRS AXIS: 47 DEGREES
QRSD INTERVAL: 92 MS
QT INTERVAL: 358 MS
QTC INTERVAL: 429 MS
RBC # BLD AUTO: 4.35 MILLION/UL (ref 3.88–5.62)
RSV RNA RESP QL NAA+PROBE: NEGATIVE
SARS-COV-2 RNA RESP QL NAA+PROBE: NEGATIVE
SODIUM SERPL-SCNC: 139 MMOL/L (ref 136–145)
SPECIMEN SOURCE: ABNORMAL
T WAVE AXIS: 68 DEGREES
VENTRICULAR RATE: 86 BPM
WBC # BLD AUTO: 6.7 THOUSAND/UL (ref 4.31–10.16)

## 2022-03-23 PROCEDURE — C9113 INJ PANTOPRAZOLE SODIUM, VIA: HCPCS | Performed by: PHYSICIAN ASSISTANT

## 2022-03-23 PROCEDURE — C1730 CATH, EP, 19 OR FEW ELECT: HCPCS | Performed by: INTERNAL MEDICINE

## 2022-03-23 PROCEDURE — C1894 INTRO/SHEATH, NON-LASER: HCPCS | Performed by: INTERNAL MEDICINE

## 2022-03-23 PROCEDURE — C1893 INTRO/SHEATH, FIXED,NON-PEEL: HCPCS | Performed by: INTERNAL MEDICINE

## 2022-03-23 PROCEDURE — 93655 ICAR CATH ABLTJ DSCRT ARRHYT: CPT | Performed by: INTERNAL MEDICINE

## 2022-03-23 PROCEDURE — C2630 CATH, EP, COOL-TIP: HCPCS | Performed by: INTERNAL MEDICINE

## 2022-03-23 PROCEDURE — 93656 COMPRE EP EVAL ABLTJ ATR FIB: CPT | Performed by: INTERNAL MEDICINE

## 2022-03-23 PROCEDURE — NC001 PR NO CHARGE: Performed by: PHYSICIAN ASSISTANT

## 2022-03-23 PROCEDURE — 93005 ELECTROCARDIOGRAM TRACING: CPT

## 2022-03-23 PROCEDURE — C1769 GUIDE WIRE: HCPCS | Performed by: INTERNAL MEDICINE

## 2022-03-23 PROCEDURE — 0241U HB NFCT DS VIR RESP RNA 4 TRGT: CPT | Performed by: PHYSICIAN ASSISTANT

## 2022-03-23 PROCEDURE — C1759 CATH, INTRA ECHOCARDIOGRAPHY: HCPCS | Performed by: INTERNAL MEDICINE

## 2022-03-23 PROCEDURE — 76937 US GUIDE VASCULAR ACCESS: CPT | Performed by: INTERNAL MEDICINE

## 2022-03-23 PROCEDURE — 93010 ELECTROCARDIOGRAM REPORT: CPT | Performed by: INTERNAL MEDICINE

## 2022-03-23 PROCEDURE — 85025 COMPLETE CBC W/AUTO DIFF WBC: CPT | Performed by: PHYSICIAN ASSISTANT

## 2022-03-23 PROCEDURE — 85347 COAGULATION TIME ACTIVATED: CPT

## 2022-03-23 PROCEDURE — 93653 COMPRE EP EVAL TX SVT: CPT | Performed by: INTERNAL MEDICINE

## 2022-03-23 PROCEDURE — 85730 THROMBOPLASTIN TIME PARTIAL: CPT | Performed by: PHYSICIAN ASSISTANT

## 2022-03-23 PROCEDURE — C1733 CATH, EP, OTHR THAN COOL-TIP: HCPCS | Performed by: INTERNAL MEDICINE

## 2022-03-23 PROCEDURE — 93462 L HRT CATH TRNSPTL PUNCTURE: CPT | Performed by: INTERNAL MEDICINE

## 2022-03-23 PROCEDURE — 80048 BASIC METABOLIC PNL TOTAL CA: CPT | Performed by: PHYSICIAN ASSISTANT

## 2022-03-23 PROCEDURE — 85610 PROTHROMBIN TIME: CPT | Performed by: PHYSICIAN ASSISTANT

## 2022-03-23 RX ORDER — LOSARTAN POTASSIUM 25 MG/1
25 TABLET ORAL DAILY
Status: DISCONTINUED | OUTPATIENT
Start: 2022-03-24 | End: 2022-03-24 | Stop reason: HOSPADM

## 2022-03-23 RX ORDER — DEXAMETHASONE SODIUM PHOSPHATE 10 MG/ML
INJECTION, SOLUTION INTRAMUSCULAR; INTRAVENOUS AS NEEDED
Status: DISCONTINUED | OUTPATIENT
Start: 2022-03-23 | End: 2022-03-23

## 2022-03-23 RX ORDER — DILTIAZEM HYDROCHLORIDE 180 MG/1
180 CAPSULE, COATED, EXTENDED RELEASE ORAL DAILY
Status: DISCONTINUED | OUTPATIENT
Start: 2022-03-24 | End: 2022-03-24 | Stop reason: HOSPADM

## 2022-03-23 RX ORDER — FENTANYL CITRATE 50 UG/ML
INJECTION, SOLUTION INTRAMUSCULAR; INTRAVENOUS AS NEEDED
Status: DISCONTINUED | OUTPATIENT
Start: 2022-03-23 | End: 2022-03-23

## 2022-03-23 RX ORDER — METOCLOPRAMIDE HYDROCHLORIDE 5 MG/ML
10 INJECTION INTRAMUSCULAR; INTRAVENOUS ONCE AS NEEDED
Status: DISCONTINUED | OUTPATIENT
Start: 2022-03-23 | End: 2022-03-24 | Stop reason: HOSPADM

## 2022-03-23 RX ORDER — HEPARIN SODIUM 1000 [USP'U]/ML
4000 INJECTION, SOLUTION INTRAVENOUS; SUBCUTANEOUS
Status: DISCONTINUED | OUTPATIENT
Start: 2022-03-23 | End: 2022-03-23

## 2022-03-23 RX ORDER — HYDROMORPHONE HCL/PF 1 MG/ML
0.5 SYRINGE (ML) INJECTION
Status: CANCELLED | OUTPATIENT
Start: 2022-03-23

## 2022-03-23 RX ORDER — HEPARIN SODIUM 1000 [USP'U]/ML
INJECTION, SOLUTION INTRAVENOUS; SUBCUTANEOUS AS NEEDED
Status: DISCONTINUED | OUTPATIENT
Start: 2022-03-23 | End: 2022-03-23 | Stop reason: HOSPADM

## 2022-03-23 RX ORDER — FLUTICASONE PROPIONATE 50 MCG
2 SPRAY, SUSPENSION (ML) NASAL DAILY
Status: DISCONTINUED | OUTPATIENT
Start: 2022-03-23 | End: 2022-03-24 | Stop reason: HOSPADM

## 2022-03-23 RX ORDER — PROTAMINE SULFATE 10 MG/ML
INJECTION, SOLUTION INTRAVENOUS AS NEEDED
Status: DISCONTINUED | OUTPATIENT
Start: 2022-03-23 | End: 2022-03-23

## 2022-03-23 RX ORDER — SODIUM CHLORIDE 9 MG/ML
INJECTION, SOLUTION INTRAVENOUS CONTINUOUS PRN
Status: DISCONTINUED | OUTPATIENT
Start: 2022-03-23 | End: 2022-03-23

## 2022-03-23 RX ORDER — FENTANYL CITRATE/PF 50 MCG/ML
25 SYRINGE (ML) INJECTION
Status: CANCELLED | OUTPATIENT
Start: 2022-03-23

## 2022-03-23 RX ORDER — ACETAMINOPHEN 325 MG/1
650 TABLET ORAL EVERY 4 HOURS PRN
Status: DISCONTINUED | OUTPATIENT
Start: 2022-03-23 | End: 2022-03-24 | Stop reason: HOSPADM

## 2022-03-23 RX ORDER — KETAMINE HYDROCHLORIDE 50 MG/ML
INJECTION, SOLUTION, CONCENTRATE INTRAMUSCULAR; INTRAVENOUS AS NEEDED
Status: DISCONTINUED | OUTPATIENT
Start: 2022-03-23 | End: 2022-03-23

## 2022-03-23 RX ORDER — ONDANSETRON 2 MG/ML
4 INJECTION INTRAMUSCULAR; INTRAVENOUS ONCE AS NEEDED
Status: DISCONTINUED | OUTPATIENT
Start: 2022-03-23 | End: 2022-03-24 | Stop reason: HOSPADM

## 2022-03-23 RX ORDER — SUCCINYLCHOLINE/SOD CL,ISO/PF 100 MG/5ML
SYRINGE (ML) INTRAVENOUS AS NEEDED
Status: DISCONTINUED | OUTPATIENT
Start: 2022-03-23 | End: 2022-03-23

## 2022-03-23 RX ORDER — FENTANYL CITRATE/PF 50 MCG/ML
25 SYRINGE (ML) INJECTION
Status: DISCONTINUED | OUTPATIENT
Start: 2022-03-23 | End: 2022-03-24 | Stop reason: HOSPADM

## 2022-03-23 RX ORDER — KETOROLAC TROMETHAMINE 30 MG/ML
30 INJECTION, SOLUTION INTRAMUSCULAR; INTRAVENOUS ONCE
Status: COMPLETED | OUTPATIENT
Start: 2022-03-23 | End: 2022-03-23

## 2022-03-23 RX ORDER — ALBUTEROL SULFATE 2.5 MG/3ML
2.5 SOLUTION RESPIRATORY (INHALATION) ONCE AS NEEDED
Status: CANCELLED | OUTPATIENT
Start: 2022-03-23

## 2022-03-23 RX ORDER — HEPARIN SODIUM 10000 [USP'U]/100ML
3-20 INJECTION, SOLUTION INTRAVENOUS
Status: DISCONTINUED | OUTPATIENT
Start: 2022-03-23 | End: 2022-03-23

## 2022-03-23 RX ORDER — HEPARIN SODIUM 1000 [USP'U]/ML
4000 INJECTION, SOLUTION INTRAVENOUS; SUBCUTANEOUS ONCE
Status: DISCONTINUED | OUTPATIENT
Start: 2022-03-23 | End: 2022-03-23

## 2022-03-23 RX ORDER — MIDAZOLAM HYDROCHLORIDE 2 MG/2ML
INJECTION, SOLUTION INTRAMUSCULAR; INTRAVENOUS AS NEEDED
Status: DISCONTINUED | OUTPATIENT
Start: 2022-03-23 | End: 2022-03-23

## 2022-03-23 RX ORDER — HEPARIN SODIUM 1000 [USP'U]/ML
2000 INJECTION, SOLUTION INTRAVENOUS; SUBCUTANEOUS
Status: DISCONTINUED | OUTPATIENT
Start: 2022-03-23 | End: 2022-03-23

## 2022-03-23 RX ORDER — ALBUTEROL SULFATE 2.5 MG/3ML
2.5 SOLUTION RESPIRATORY (INHALATION) ONCE AS NEEDED
Status: DISCONTINUED | OUTPATIENT
Start: 2022-03-23 | End: 2022-03-24 | Stop reason: HOSPADM

## 2022-03-23 RX ORDER — COLCHICINE 0.6 MG/1
0.6 TABLET ORAL 2 TIMES DAILY
Status: DISCONTINUED | OUTPATIENT
Start: 2022-03-23 | End: 2022-03-24 | Stop reason: HOSPADM

## 2022-03-23 RX ORDER — ALBUTEROL SULFATE 90 UG/1
AEROSOL, METERED RESPIRATORY (INHALATION) AS NEEDED
Status: DISCONTINUED | OUTPATIENT
Start: 2022-03-23 | End: 2022-03-23

## 2022-03-23 RX ORDER — PANTOPRAZOLE SODIUM 40 MG/1
40 TABLET, DELAYED RELEASE ORAL
Status: DISCONTINUED | OUTPATIENT
Start: 2022-03-24 | End: 2022-03-24 | Stop reason: HOSPADM

## 2022-03-23 RX ORDER — ONDANSETRON 2 MG/ML
4 INJECTION INTRAMUSCULAR; INTRAVENOUS ONCE AS NEEDED
Status: CANCELLED | OUTPATIENT
Start: 2022-03-23

## 2022-03-23 RX ORDER — PROPOFOL 10 MG/ML
INJECTION, EMULSION INTRAVENOUS AS NEEDED
Status: DISCONTINUED | OUTPATIENT
Start: 2022-03-23 | End: 2022-03-23

## 2022-03-23 RX ORDER — METOCLOPRAMIDE HYDROCHLORIDE 5 MG/ML
10 INJECTION INTRAMUSCULAR; INTRAVENOUS ONCE AS NEEDED
Status: CANCELLED | OUTPATIENT
Start: 2022-03-23

## 2022-03-23 RX ORDER — ATORVASTATIN CALCIUM 10 MG/1
10 TABLET, FILM COATED ORAL DAILY
Status: DISCONTINUED | OUTPATIENT
Start: 2022-03-23 | End: 2022-03-24 | Stop reason: HOSPADM

## 2022-03-23 RX ORDER — HYDROMORPHONE HCL/PF 1 MG/ML
0.5 SYRINGE (ML) INJECTION
Status: DISCONTINUED | OUTPATIENT
Start: 2022-03-23 | End: 2022-03-24 | Stop reason: HOSPADM

## 2022-03-23 RX ORDER — PANTOPRAZOLE SODIUM 40 MG/1
40 INJECTION, POWDER, FOR SOLUTION INTRAVENOUS ONCE
Status: COMPLETED | OUTPATIENT
Start: 2022-03-23 | End: 2022-03-23

## 2022-03-23 RX ORDER — LIDOCAINE HYDROCHLORIDE 10 MG/ML
INJECTION, SOLUTION EPIDURAL; INFILTRATION; INTRACAUDAL; PERINEURAL AS NEEDED
Status: DISCONTINUED | OUTPATIENT
Start: 2022-03-23 | End: 2022-03-23

## 2022-03-23 RX ORDER — OXYCODONE HYDROCHLORIDE 5 MG/1
5 TABLET ORAL EVERY 4 HOURS PRN
Status: DISCONTINUED | OUTPATIENT
Start: 2022-03-23 | End: 2022-03-24 | Stop reason: HOSPADM

## 2022-03-23 RX ORDER — ONDANSETRON 2 MG/ML
INJECTION INTRAMUSCULAR; INTRAVENOUS AS NEEDED
Status: DISCONTINUED | OUTPATIENT
Start: 2022-03-23 | End: 2022-03-23

## 2022-03-23 RX ADMIN — FENTANYL CITRATE 100 MCG: 50 INJECTION INTRAMUSCULAR; INTRAVENOUS at 08:44

## 2022-03-23 RX ADMIN — KETOROLAC TROMETHAMINE 30 MG: 30 INJECTION, SOLUTION INTRAMUSCULAR at 12:28

## 2022-03-23 RX ADMIN — Medication 100 MG: at 08:44

## 2022-03-23 RX ADMIN — SODIUM CHLORIDE: 0.9 INJECTION, SOLUTION INTRAVENOUS at 08:26

## 2022-03-23 RX ADMIN — COLCHICINE 0.6 MG: 0.6 TABLET ORAL at 17:20

## 2022-03-23 RX ADMIN — DEXAMETHASONE SODIUM PHOSPHATE 10 MG: 10 INJECTION, SOLUTION INTRAMUSCULAR; INTRAVENOUS at 08:51

## 2022-03-23 RX ADMIN — SODIUM CHLORIDE: 0.9 INJECTION, SOLUTION INTRAVENOUS at 11:06

## 2022-03-23 RX ADMIN — PHENYLEPHRINE HYDROCHLORIDE 20 MCG/MIN: 10 INJECTION INTRAVENOUS at 09:04

## 2022-03-23 RX ADMIN — MIDAZOLAM 2 MG: 1 INJECTION INTRAMUSCULAR; INTRAVENOUS at 08:26

## 2022-03-23 RX ADMIN — LIDOCAINE HYDROCHLORIDE 60 MG: 10 INJECTION, SOLUTION EPIDURAL; INFILTRATION; INTRACAUDAL; PERINEURAL at 08:44

## 2022-03-23 RX ADMIN — ONDANSETRON 4 MG: 2 INJECTION INTRAMUSCULAR; INTRAVENOUS at 09:22

## 2022-03-23 RX ADMIN — Medication 25 MCG: at 11:41

## 2022-03-23 RX ADMIN — HYDROMORPHONE HYDROCHLORIDE 0.5 MG: 1 INJECTION, SOLUTION INTRAMUSCULAR; INTRAVENOUS; SUBCUTANEOUS at 12:23

## 2022-03-23 RX ADMIN — PANTOPRAZOLE SODIUM 40 MG: 40 INJECTION, POWDER, FOR SOLUTION INTRAVENOUS at 08:40

## 2022-03-23 RX ADMIN — KETAMINE HYDROCHLORIDE 20 MG: 50 INJECTION, SOLUTION INTRAMUSCULAR; INTRAVENOUS at 09:45

## 2022-03-23 RX ADMIN — ATORVASTATIN CALCIUM 10 MG: 10 TABLET, FILM COATED ORAL at 17:20

## 2022-03-23 RX ADMIN — KETAMINE HYDROCHLORIDE 30 MG: 50 INJECTION, SOLUTION INTRAMUSCULAR; INTRAVENOUS at 08:56

## 2022-03-23 RX ADMIN — APIXABAN 5 MG: 5 TABLET, FILM COATED ORAL at 17:20

## 2022-03-23 RX ADMIN — APIXABAN 5 MG: 5 TABLET, FILM COATED ORAL at 12:11

## 2022-03-23 RX ADMIN — Medication 25 MCG: at 11:51

## 2022-03-23 RX ADMIN — ALBUTEROL SULFATE 4 PUFF: 90 AEROSOL, METERED RESPIRATORY (INHALATION) at 09:47

## 2022-03-23 RX ADMIN — PROPOFOL 200 MG: 10 INJECTION, EMULSION INTRAVENOUS at 08:44

## 2022-03-23 RX ADMIN — PROTAMINE SULFATE 40 MG: 10 INJECTION, SOLUTION INTRAVENOUS at 11:10

## 2022-03-23 RX ADMIN — HYDROMORPHONE HYDROCHLORIDE 0.5 MG: 1 INJECTION, SOLUTION INTRAMUSCULAR; INTRAVENOUS; SUBCUTANEOUS at 12:02

## 2022-03-23 NOTE — Clinical Note
The site was marked  Prepped: groin  Prepped with: ChloraPrep  The site was clipped  The patient was draped

## 2022-03-23 NOTE — ANESTHESIA PREPROCEDURE EVALUATION
Procedure:  Cardiac eps/afib ablation (N/A Chest)  Cardiac eps/aflutter ablation (N/A Chest)    Relevant Problems   ANESTHESIA (within normal limits)      CARDIO   (+) Atrial flutter (HCC)   (+) EVANS (dyspnea on exertion) (due to AFIb)   (+) Essential hypertension   (+) Hypercholesterolemia   (+) Migraine headache      /RENAL   (+) Kidney stone      HEMATOLOGY  On eliquis, last taken yesterday      NEURO/PSYCH   (+) Migraine headache      PULMONARY   (+) EVANS (dyspnea on exertion) (due to AFIb)      Other   (+) Anticoagulated   (+) Palpitations        Physical Exam    Airway    Mallampati score: II  TM Distance: >3 FB  Neck ROM: full     Dental       Cardiovascular  Rhythm: irregular,     Pulmonary  Breath sounds clear to auscultation,     Other Findings        Anesthesia Plan  ASA Score- 3     Anesthesia Type- general with ASA Monitors  Additional Monitors:   Airway Plan: ETT  Plan Factors-Exercise tolerance (METS): <4 METS  Chart reviewed  EKG reviewed  Patient summary reviewed  Patient is not a current smoker  Obstructive sleep apnea risk education given perioperatively  Induction- intravenous  Postoperative Plan-   Planned trial extubation    Informed Consent- Anesthetic plan and risks discussed with patient  I personally reviewed this patient with the CRNA  Discussed and agreed on the Anesthesia Plan with the CRNA  Florence Dennis

## 2022-03-23 NOTE — H&P
H&P Exam - Cardiology   Adalgisa Patel 57 y o  male MRN: [id number]  Unit/Bed#: BE CATH LAB ROOM Encounter: [id number]    Assessment/Plan     Assessment:  1  Symptomatic paroxysmal atrial fibrillation and flutter with periods of rapid ventricular response   A ) recent event monitor noted infrequent (<1%), paroxysmal, but highly symptomatic episodes   B ) 5% PACs noted   C ) currently maintained on diltiazem and Eliquis   D ) initially diagnosed with symptomatic atrial flutter 12/2018  2  Preserved LV systolic function with EF 55% per echo 01/2019  3  Hypertension  4  Hyperlipidemia       Plan:  Atrial fibrillation/flutter ablation      History of Present Illness   HPI:  Adalgisa Patel is a 57y o  year old male with symptomatic paroxysmal atrial fibrillation and flutter, preserved LV systolic function, hypertension, hyperlipidemia  He typically follows with Dr Bhavna Franco as outpatient  He was 1st diagnosed with symptomatic paroxysmal atrial flutter 12/2018, and was started on Eliquis and diltiazem at that time  Subsequent monitor showed isolated episode of paroxysmal atrial flutter, with which he was not highly symptomatic  Thus, ongoing medical management and watchful waiting was pursued  He had been doing well, however last month he began to notice an increase in episode burden  He underwent an event monitor which showed infrequent episodes, however he was highly symptomatic with each one  He was noted to have paroxysmal atrial flutter as well as fibrillation  The findings were discussed with Dr Bhavna Franco, and ultimately atrial fibrillation/flutter ablation was recommended  He presents today to undergo that procedure  He continues to have intermittent episodes, associated with palpitations and shortness of breath  He denies associated chest pain or pressure, dizziness, or lightheadedness  Review of Systems  ROS as noted above, otherwise 12 point review of systems was performed and is negative  Historical Information   Past Medical History:   Diagnosis Date    A-fib ([address] )     Hyperlipidemia     Hypertension      Past Surgical History:   Procedure Laterality Date    CHOLECYSTECTOMY      COLONOSCOPY      x2 - last was  (neg)    NOSE SURGERY      See ENT note 2016     Family History:   Family History   Problem Relation Age of Onset    Other Mother         Sarcoma    Hypertension Father        Social History   Social History     Substance and Sexual Activity   Alcohol Use Yes    Alcohol/week: 7 0 standard drinks    Types: 7 Glasses of wine per week    Comment: 1 glass of wine daily     Social History     Substance and Sexual Activity   Drug Use No     Social History     Tobacco Use   Smoking Status Former Smoker    Packs/day: 1 00    Years: 15 00    Pack years: 15     Types: Cigarettes    Quit date: 2014    Years since quittin 3   Smokeless Tobacco Never Used         Meds/Allergies   all medications and allergies reviewed  Home Medications:   Medications Prior to Admission   Medication    apixaban (Eliquis) 5 mg    atorvastatin (LIPITOR) 10 mg tablet    diltiazem (CARDIZEM CD) 180 mg 24 hr capsule    fluticasone (FLONASE) 50 mcg/act nasal spray    losartan (COZAAR) 25 mg tablet    EPINEPHrine (EPIPEN) 0 3 mg/0 3 mL SOAJ       Allergies   Allergen Reactions    Dust Mite Extract     Lisinopril Edema     Annotation - [id number]: lip swelling x 2 2015    Short Ragweed Pollen Ext        Objective   Vitals: Blood pressure 161/88, pulse 74, temperature 98 1 °F (36 7 °C), temperature source Oral, resp  rate 18, height 6' (1 829 m), weight 95 3 kg (210 lb), SpO2 95 %    Orthostatic Blood Pressures      Most Recent Value   Blood Pressure 161/88 filed at 2022 0715          No intake or output data in the 24 hours ending 22 0742    Invasive Devices  Report    Peripheral Intravenous Line            Peripheral IV 22 Left Antecubital <1 day Physical Exam  GEN: NAD, alert and oriented x 3, well appearing  SKIN: dry without significant lesions or rashes  HEENT: NCAT, PERRL, EOMs intact  NECK: No JVD appreciated  CARDIOVASCULAR: RRR, normal S1, S2 without murmurs, rubs, or gallops appreciated  LUNGS: Clear to auscultation bilaterally without wheezes, rhonchi, or rales  ABDOMEN: Soft, nontender, nondistended  EXTREMITIES/VASCULAR: perfused without clubbing, cyanosis, or LE edema b/l  PSYCH: Normal mood and affect  NEURO: CN ll-Xll grossly intact      Lab Results: I have personally reviewed pertinent lab results  Invalid input(s): LABGLOM              Imaging: I have personally reviewed pertinent reports  Results for orders placed during the hospital encounter of 19    Echo complete with contrast if indicated    Narrative  [address]  [facility] [place], [place], [address] [facility]  [phone]    Transthoracic Echocardiogram  2D, M-mode, Doppler, and Color Doppler    Study date:  2019    Patient: Harriet Matson  MR number: [id number]  Account number: [de-identified]  : 1960  Age: 60 years  Gender: Male  Status: Outpatient  Location: [address] [place] [address] [facility] [place]  Height: 73 in  Weight: 202 lb  BP: 139/ 79 mmHg    Indications: Atrial flutter    Diagnoses: I48 1 - Atrial flutter    Sonographer:  LAZARO Benites  Interpreting Physician:  Jin Kirk DO  Primary Physician:  Luis Freire JachinDO  Referring Physician:  Gordo Reyna DO  [facility]  [facility]  cc:  Tino Andrew DO  Cardiology Fellow:  Calvin Moreira MD    SUMMARY    LEFT VENTRICLE:  Systolic function was normal  Ejection fraction was estimated to be 55 %  There were no regional wall motion abnormalities  RIGHT VENTRICLE:  The size was normal   Systolic function was normal     HISTORY: PRIOR HISTORY: Hypertension    PROCEDURE: The study was performed in the [place] [address] and [facility]   This was a routine study  The transthoracic approach was used  The study included complete 2D imaging, M-mode, complete spectral Doppler, and color Doppler  The  heart rate was 68 bpm, at the start of the study  Images were obtained from the parasternal, apical, subcostal, and suprasternal notch acoustic windows  Echocardiographic views were limited due to poor acoustic window availability  Image  quality was adequate  LEFT VENTRICLE: Size was normal  Systolic function was normal  Ejection fraction was estimated to be 55 %  There were no regional wall motion abnormalities  Wall thickness was normal  DOPPLER: Left ventricular diastolic function parameters  were normal for the patient's age  RIGHT VENTRICLE: The size was normal  Systolic function was normal  Wall thickness was normal     LEFT ATRIUM: Size was normal     RIGHT ATRIUM: Size was normal     MITRAL VALVE: Valve structure was normal  There was normal leaflet separation  DOPPLER: The transmitral velocity was within the normal range  There was no evidence for stenosis  There was trace regurgitation  AORTIC VALVE: The valve was trileaflet  Leaflets exhibited mildly increased thickness, mild calcification, and normal cuspal separation  DOPPLER: Transaortic velocity was within the normal range  There was no evidence for stenosis  There  was no significant regurgitation  TRICUSPID VALVE: The valve structure was normal  There was normal leaflet separation  DOPPLER: The transtricuspid velocity was within the normal range  There was no evidence for stenosis  There was no significant regurgitation  PULMONIC VALVE: Leaflets exhibited normal thickness, no calcification, and normal cuspal separation  DOPPLER: The transpulmonic velocity was within the normal range  There was no significant regurgitation  PERICARDIUM: There was no pericardial effusion  AORTA: The root exhibited normal size      SYSTEMIC VEINS: IVC: The inferior vena cava was normal in size     SYSTEM MEASUREMENT TABLES    2D  %FS: 30 2 %  Ao Diam: 3 29 cm  EDV(Teich): 120 65 ml  EF(Cube): 65 99 %  EF(Teich): 57 24 %  ESV(Cube): 43 64 ml  ESV(Teich): 51 59 ml  IVSd: 0 88 cm  LA Area: 17 26 cm2  LA Diam: 3 48 cm  LVEDV MOD A4C: 109 23 ml  LVEF MOD A4C: 49 2 %  LVESV MOD A4C: 55 5 ml  LVIDd: 5 04 cm  LVIDs: 3 52 cm  LVLd A4C: 7 81 cm  LVLs A4C: 6 85 cm  LVPWd: 0 97 cm  RA Area: 13 92 cm2  RV Diam : 3 75 cm  SV MOD A4C: 53 74 ml  SV(Cube): 84 66 ml  SV(Teich): 69 06 ml    CW  TR Vmax: 2 18 m/s  TR maxP 08 mmHg    MM  TAPSE: 1 98 cm    PW  E': 0 06 m/s  E/E': 8 6  MV A Moses: 0 51 m/s  MV Dec Woodson: 1 8 m/s2  MV DecT: 281 82 ms  MV E Moses: 0 51 m/s  MV E/A Ratio: 1    [employer] Accredited Echocardiography Laboratory    Prepared and electronically signed by    Beau Caballero DO  Signed 2019 10:03:56        EKG: pending        Code Status: Level 1 - Full Code

## 2022-03-23 NOTE — Clinical Note
Site (pad location): flank and lower back  Laterality: right  Grounding pad site assessment: skin integrity intact

## 2022-03-23 NOTE — PROGRESS NOTES
PT TRANSFERRED TO PACU ACC BY CRNA  PT AA ORIENTED TO PERSON AND PLACE  PT PAIN FREE,B/L GROIN DRSGS DRY AND INTACT,CARE ASSUMED BY Werner Nyhan RN

## 2022-03-23 NOTE — ANESTHESIA POSTPROCEDURE EVALUATION
Post-Op Assessment Note    CV Status:  Stable  Pain Score: 2    Pain management: adequate     Mental Status:  Alert and awake   Hydration Status:  Euvolemic   PONV Controlled:  Controlled   Airway Patency:  Patent      Post Op Vitals Reviewed: Yes      Staff: CRNA         No complications documented      BP   149/83   Temp 98 2   Pulse 86   Resp 14   SpO2 100%

## 2022-03-24 ENCOUNTER — APPOINTMENT (OUTPATIENT)
Dept: NON INVASIVE DIAGNOSTICS | Facility: HOSPITAL | Age: 62
End: 2022-03-24
Payer: COMMERCIAL

## 2022-03-24 VITALS
RESPIRATION RATE: 18 BRPM | WEIGHT: 210 LBS | DIASTOLIC BLOOD PRESSURE: 77 MMHG | SYSTOLIC BLOOD PRESSURE: 137 MMHG | TEMPERATURE: 99.1 F | OXYGEN SATURATION: 93 % | HEIGHT: 72 IN | HEART RATE: 100 BPM | BODY MASS INDEX: 28.44 KG/M2

## 2022-03-24 LAB
ANION GAP SERPL CALCULATED.3IONS-SCNC: 6 MMOL/L (ref 4–13)
ATRIAL RATE: 83 BPM
BUN SERPL-MCNC: 20 MG/DL (ref 5–25)
CALCIUM SERPL-MCNC: 8 MG/DL (ref 8.3–10.1)
CHLORIDE SERPL-SCNC: 110 MMOL/L (ref 100–108)
CO2 SERPL-SCNC: 23 MMOL/L (ref 21–32)
CREAT SERPL-MCNC: 0.95 MG/DL (ref 0.6–1.3)
ERYTHROCYTE [DISTWIDTH] IN BLOOD BY AUTOMATED COUNT: 13.1 % (ref 11.6–15.1)
GFR SERPL CREATININE-BSD FRML MDRD: 85 ML/MIN/1.73SQ M
GLUCOSE SERPL-MCNC: 132 MG/DL (ref 65–140)
HCT VFR BLD AUTO: 35.4 % (ref 36.5–49.3)
HGB BLD-MCNC: 12.4 G/DL (ref 12–17)
MCH RBC QN AUTO: 33 PG (ref 26.8–34.3)
MCHC RBC AUTO-ENTMCNC: 35 G/DL (ref 31.4–37.4)
MCV RBC AUTO: 94 FL (ref 82–98)
P AXIS: 68 DEGREES
PLATELET # BLD AUTO: 217 THOUSANDS/UL (ref 149–390)
PMV BLD AUTO: 9.5 FL (ref 8.9–12.7)
POTASSIUM SERPL-SCNC: 4 MMOL/L (ref 3.5–5.3)
PR INTERVAL: 142 MS
QRS AXIS: 50 DEGREES
QRSD INTERVAL: 92 MS
QT INTERVAL: 363 MS
QTC INTERVAL: 427 MS
RBC # BLD AUTO: 3.76 MILLION/UL (ref 3.88–5.62)
SODIUM SERPL-SCNC: 139 MMOL/L (ref 136–145)
T WAVE AXIS: 44 DEGREES
VENTRICULAR RATE: 83 BPM
WBC # BLD AUTO: 13.17 THOUSAND/UL (ref 4.31–10.16)

## 2022-03-24 PROCEDURE — 93926 LOWER EXTREMITY STUDY: CPT

## 2022-03-24 PROCEDURE — 93926 LOWER EXTREMITY STUDY: CPT | Performed by: SURGERY

## 2022-03-24 PROCEDURE — 80048 BASIC METABOLIC PNL TOTAL CA: CPT | Performed by: PHYSICIAN ASSISTANT

## 2022-03-24 PROCEDURE — 85027 COMPLETE CBC AUTOMATED: CPT | Performed by: PHYSICIAN ASSISTANT

## 2022-03-24 PROCEDURE — 93010 ELECTROCARDIOGRAM REPORT: CPT | Performed by: INTERNAL MEDICINE

## 2022-03-24 PROCEDURE — NC001 PR NO CHARGE: Performed by: PHYSICIAN ASSISTANT

## 2022-03-24 RX ORDER — COLCHICINE 0.6 MG/1
0.6 TABLET ORAL 2 TIMES DAILY PRN
Qty: 14 TABLET | Refills: 1 | Status: SHIPPED | OUTPATIENT
Start: 2022-03-24 | End: 2022-04-20

## 2022-03-24 RX ORDER — PANTOPRAZOLE SODIUM 40 MG/1
40 TABLET, DELAYED RELEASE ORAL
Qty: 30 TABLET | Refills: 0 | Status: SHIPPED | OUTPATIENT
Start: 2022-03-25 | End: 2022-04-20

## 2022-03-24 RX ADMIN — FLUTICASONE PROPIONATE 2 SPRAY: 50 SPRAY, METERED NASAL at 08:51

## 2022-03-24 RX ADMIN — COLCHICINE 0.6 MG: 0.6 TABLET ORAL at 08:50

## 2022-03-24 RX ADMIN — PANTOPRAZOLE SODIUM 40 MG: 40 TABLET, DELAYED RELEASE ORAL at 05:56

## 2022-03-24 RX ADMIN — LOSARTAN POTASSIUM 25 MG: 25 TABLET, FILM COATED ORAL at 08:50

## 2022-03-24 RX ADMIN — ATORVASTATIN CALCIUM 10 MG: 10 TABLET, FILM COATED ORAL at 08:50

## 2022-03-24 RX ADMIN — APIXABAN 5 MG: 5 TABLET, FILM COATED ORAL at 08:50

## 2022-03-24 RX ADMIN — DILTIAZEM HYDROCHLORIDE 180 MG: 180 CAPSULE, COATED, EXTENDED RELEASE ORAL at 08:50

## 2022-03-24 NOTE — DISCHARGE INSTRUCTIONS
PLEASE NOTE THE FOLLOWING MEDICATION RECOMMENDATIONS:  - continue Eliquis and diltiazem as previously instructed  - take pantoprazole 40 mg daily for 30 days, at which time it can be discontinued  - take colchicine 0 6 mg twice daily as needed for post op chest discomfort        RESTRICTIONS:  No heavy lifting or strenuous activity for one week  No soaking in a bath tub/hot tub/swimming pool for one week or until groin heals  You may shower - please let soap and water run over the groins, no scrubbing, and pat the area dry  Please place band-aid on groins daily for up to five days, but you may remove sooner if no issues are noted  If you notice ongoing bleeding, swelling, or firm lumps in groin near ablation incision, please contact Dr Alba Kohli office - [phone]

## 2022-03-24 NOTE — DISCHARGE SUMMARY
Discharge Summary - Jw Rivero 57 y o  male MRN: [id number]    Unit/Bed#: CW2 218-01 Encounter: [id number]      Admission Date: 3/23/2022   Discharge Date: 3/24/2022    Discharge Diagnosis:   1  Symptomatic paroxysmal atrial fibrillation and flutter with periods of rapid ventricular response, status post cryoablation with pulmonary vein isolation and typical flutter line 3/23/2022              A ) recent event monitor noted infrequent (<1%), paroxysmal, but highly symptomatic episodes              B ) 5% PACs noted              C ) currently maintained on diltiazem and Eliquis              D ) initially diagnosed with symptomatic atrial flutter 12/2018  2  Preserved LV systolic function with EF 55% per echo 01/2019  3  Hypertension  4  Hyperlipidemia       Procedures Performed:   Orders Placed This Encounter   Procedures    Cardiac ep lab eps/ablations   Catheter ablation of typical caval tricuspid isthmus dependent atrial flutter, cryo balloon ablation for treatment of atrial fibrillation, 3D electro anatomic mapping using the Ev[employer] two mapping system, intracardiac ECHO, ultrasound-guided venous access       Consultants: none      HPI: Please refer to the initial history and physical as well as procedure notes for full details  Briefly, Jw Rivero is a 57y o  year old male with symptomatic paroxysmal atrial fibrillation and flutter, preserved LV systolic function, hypertension, hyperlipidemia  He typically follows with Dr Maksim bran as outpatient  He was 1st diagnosed with symptomatic paroxysmal atrial flutter 12/2018, and was started on Eliquis and diltiazem at that time  Subsequent monitor showed isolated episode of paroxysmal atrial flutter, with which he was not highly symptomatic  Thus, ongoing medical management and watchful waiting was pursued  He had been doing well, however last month he began to notice an increase in episode burden    He underwent an event monitor which showed infrequent episodes, however he was highly symptomatic with each one  He was noted to have paroxysmal atrial flutter as well as fibrillation  The findings were discussed with Dr Jossie Pimentel, and ultimately atrial fibrillation/flutter ablation was recommended  He presented this hospital admission to undergo this procedure  Hospital Course: Pam Hong presented at his baseline state of health  After the procedure was explained in detail and consent was obtained, he underwent the above procedures without complications  Please see operative notes by Dr Jossie Pimentel for full details  He tolerated the procedure well  In the immediate post ablation setting, he had symptoms consistent with pericarditis resolved with IV Toradol and colchicine  He was then monitored overnight for further observation  There were no acute issues or events overnight  The following morning he denied all cardiac complaints, including chest pain/pressure (with deep inspiration or when lying flat), dyspnea, palpitations, dizziness, lightheadedness, or syncope  His vital signs were reviewed and labs are stable  Telemetry showed normal sinus rhythm, borderline sinus tachycardia with rates around 100 beats per minute  His groins were soft without significant hematoma or recurrent bleeding, and groin sutures were removed with incident  There were small lungs right at his access sites bilaterally, which were mildly tender to palpation  While there is no obvious hematoma, groin ultrasounds were ordered to rule out AV fistula or pseudoaneurysm  These were found to be negative, thus the patient was cleared for discharge  Review of Systems   Constitutional: Negative for fever and malaise/fatigue  Cardiovascular: Negative for chest pain, dyspnea on exertion, irregular heartbeat, leg swelling, near-syncope, orthopnea, palpitations, paroxysmal nocturnal dyspnea and syncope  All other systems reviewed and are negative        Physical exam:  GEN: NAD, alert and oriented x 3, well appearing  SKIN: dry without significant lesions or rashes  HEENT: NCAT, PERRL, EOMs intact  NECK: No JVD appreciated  CARDIOVASCULAR: RRR, normal S1, S2 without murmurs, rubs, or gallops appreciated  LUNGS: Clear to auscultation bilaterally without wheezes, rhonchi, or rales  ABDOMEN: Soft, nontender, nondistended  EXTREMITIES/VASCULAR: perfused without clubbing, cyanosis, or LE edema b/l  PSYCH: Normal mood and affect  NEURO: CN ll-Xll grossly intact    He was given routine post ablation discharge instructions and restrictions, and these were explained in detail  He was given a follow up appointment with Katelyn Bee PA-C, and he was instructed to follow up with his primary cardiologist as previously instructed  In terms of his medications, he will continue Eliquis and diltiazem as previously instructed  He was asked to take pantoprazole 40 mg daily for 30 days, at which time it can be discontinued  While his postop pericarditis has improved, I will provide a one-week script for colchicine 0 6 mg twice daily as needed for ongoing chest pain  He is stable for discharge at this time with all questions answered  He was discussed in detail with Dr Aurelio Dominguez who is in agreement with this discharge summary  Discharge Medications:  See after visit summary for reconciled discharge medications provided to patient and family      Medications Prior to Admission   Medication    apixaban (Eliquis) 5 mg    atorvastatin (LIPITOR) 10 mg tablet    diltiazem (CARDIZEM CD) 180 mg 24 hr capsule    fluticasone (FLONASE) 50 mcg/act nasal spray    losartan (COZAAR) 25 mg tablet    EPINEPHrine (EPIPEN) 0 3 mg/0 3 mL [facility] Labs/diagnostics:  CBC with diff:   Results from last 7 days   Lab Units 03/24/22  0544 03/23/22  0732   WBC Thousand/uL 13 17* 6 70   HEMOGLOBIN g/dL 12 4 14 2   HEMATOCRIT % 35 4* 40 8   MCV fL 94 94   PLATELETS Thousands/uL 217 236 MCH pg 33 0 32 6   MCHC g/dL 35 0 34 8   RDW % 13 1 13 1   MPV fL 9 5 9 3   NRBC AUTO /100 WBCs  --  0       BMP:  Results from last 7 days   Lab Units 03/24/22  0544 03/23/22  0732   POTASSIUM mmol/L 4 0 4 2   CHLORIDE mmol/L 110* 111*   CO2 mmol/L 23 24   BUN mg/dL 20 17   CREATININE mg/dL 0 95 0 96   CALCIUM mg/dL 8 0* 8 9       Magnesium:       Coags:   Results from last 7 days   Lab Units 03/23/22  0732   PTT seconds 30   INR  5 06         Complications: none    Condition at Discharge: good     Discharge instructions/Information to patient and family:   See after visit summary for information provided to patient and family  Provisions for Follow-Up Care:  See after visit summary for information related to follow-up care and any pertinent home health orders  Disposition: Home    Planned Readmission: No    Discharge Statement   I spent 45 minutes minutes discharging the patient  This time was spent on the day of discharge  I had direct contact with the patient on the day of discharge  Additional documentation is required if more than 30 minutes were spent on discharge   Evaluating the incision, discussing discharge instructions and restrictions, arranging follow up appointments, discussing medications

## 2022-03-24 NOTE — PLAN OF CARE
Problem: PAIN - ADULT  Goal: Verbalizes/displays adequate comfort level or baseline comfort level  Description: Interventions:  - Encourage patient to monitor pain and request assistance  - Assess pain using appropriate pain scale  - Administer analgesics based on type and severity of pain and evaluate response  - Implement non-pharmacological measures as appropriate and evaluate response  - Consider cultural and social influences on pain and pain management  - Notify physician/advanced practitioner if interventions unsuccessful or patient reports new pain  Outcome: Progressing     Problem: INFECTION - ADULT  Goal: Absence or prevention of progression during hospitalization  Description: INTERVENTIONS:  - Assess and monitor for signs and symptoms of infection  - Monitor lab/diagnostic results  - Monitor all insertion sites, i e  indwelling lines, tubes, and drains  - Monitor endotracheal if appropriate and nasal secretions for changes in amount and color  - [place] appropriate cooling/warming therapies per order  - Administer medications as ordered  - Instruct and encourage patient and family to use good hand hygiene technique  - Identify and instruct in appropriate isolation precautions for identified infection/condition  Outcome: Progressing  Goal: Absence of fever/infection during neutropenic period  Description: INTERVENTIONS:  - Monitor WBC    Outcome: Progressing     Problem: SAFETY ADULT  Goal: Patient will remain free of falls  Description: INTERVENTIONS:  - Educate patient/family on patient safety including physical limitations  - Instruct patient to call for assistance with activity   - Consult OT/PT to assist with strengthening/mobility   - Keep Call bell within reach  - Keep bed low and locked with side rails adjusted as appropriate  - Keep care items and personal belongings within reach  - Initiate and maintain comfort rounds  - Make Fall Risk Sign visible to staff  - Offer Toileting every  Hours, in advance of need  - Initiate/Maintain alarm  - Obtain necessary fall risk management equipment:   - Apply yellow socks and bracelet for high fall risk patients  - Consider moving patient to room near nurses station  Outcome: Progressing  Goal: Maintain or return to baseline ADL function  Description: INTERVENTIONS:  -  Assess patient's ability to carry out ADLs; assess patient's baseline for ADL function and identify physical deficits which impact ability to perform ADLs (bathing, care of mouth/teeth, toileting, grooming, dressing, etc )  - Assess/evaluate cause of self-care deficits   - Assess range of motion  - Assess patient's mobility; develop plan if impaired  - Assess patient's need for assistive devices and provide as appropriate  - Encourage maximum independence but intervene and supervise when necessary  - Involve family in performance of ADLs  - Assess for home care needs following discharge   - Consider OT consult to assist with ADL evaluation and planning for discharge  - Provide patient education as appropriate  Outcome: Progressing  Goal: Maintains/Returns to pre admission functional level  Description: INTERVENTIONS:  - Perform BMAT or MOVE assessment daily    - Set and communicate daily mobility goal to care team and patient/family/caregiver  - Collaborate with rehabilitation services on mobility goals if consulted  - Perform Range of Motion  times a day  - Reposition patient every  hours    - Dangle patient  times a day  - Stand patient  times a day  - Ambulate patient times a day  - Out of bed to chair  times a day   - Out of bed for meals  times a day  - Out of bed for toileting  - Record patient progress and toleration of activity level   Outcome: Progressing     Problem: DISCHARGE PLANNING  Goal: Discharge to home or other facility with appropriate resources  Description: INTERVENTIONS:  - Identify barriers to discharge w/patient and caregiver  - Arrange for needed discharge resources and transportation as appropriate  - Identify discharge learning needs (meds, wound care, etc )  - Arrange for interpretive services to assist at discharge as needed  - Refer to Case Management Department for coordinating discharge planning if the patient needs post-hospital services based on physician/advanced practitioner order or complex needs related to functional status, cognitive ability, or social support system  Outcome: Progressing     Problem: Knowledge Deficit  Goal: Patient/family/caregiver demonstrates understanding of disease process, treatment plan, medications, and discharge instructions  Description: Complete learning assessment and assess knowledge base    Interventions:  - Provide teaching at level of understanding  - Provide teaching via preferred learning methods  Outcome: Progressing     Problem: Potential for Falls  Goal: Patient will remain free of falls  Description: INTERVENTIONS:  - Educate patient/family on patient safety including physical limitations  - Instruct patient to call for assistance with activity   - Consult OT/PT to assist with strengthening/mobility   - Keep Call bell within reach  - Keep bed low and locked with side rails adjusted as appropriate  - Keep care items and personal belongings within reach  - Initiate and maintain comfort rounds  - Make Fall Risk Sign visible to staff  - Offer Toileting every Hours, in advance of need  - Initiate/Maintain   alarm  - Obtain necessary fall risk management equipment:     - Apply yellow socks and bracelet for high fall risk patients  - Consider moving patient to room near nurses station  Outcome: Progressing

## 2022-03-24 NOTE — PLAN OF CARE
Problem: PAIN - ADULT  Goal: Verbalizes/displays adequate comfort level or baseline comfort level  Description: Interventions:  - Encourage patient to monitor pain and request assistance  - Assess pain using appropriate pain scale  - Administer analgesics based on type and severity of pain and evaluate response  - Implement non-pharmacological measures as appropriate and evaluate response  - Consider cultural and social influences on pain and pain management  - Notify physician/advanced practitioner if interventions unsuccessful or patient reports new pain  Outcome: Progressing     Problem: INFECTION - ADULT  Goal: Absence or prevention of progression during hospitalization  Description: INTERVENTIONS:  - Assess and monitor for signs and symptoms of infection  - Monitor lab/diagnostic results  - Monitor all insertion sites, i e  indwelling lines, tubes, and drains  - Monitor endotracheal if appropriate and nasal secretions for changes in amount and color  - [place] appropriate cooling/warming therapies per order  - Administer medications as ordered  - Instruct and encourage patient and family to use good hand hygiene technique  - Identify and instruct in appropriate isolation precautions for identified infection/condition  Outcome: Progressing     Problem: INFECTION - ADULT  Goal: Absence of fever/infection during neutropenic period  Description: INTERVENTIONS:  - Monitor WBC    Outcome: Progressing     Problem: DISCHARGE PLANNING  Goal: Discharge to home or other facility with appropriate resources  Description: INTERVENTIONS:  - Identify barriers to discharge w/patient and caregiver  - Arrange for needed discharge resources and transportation as appropriate  - Identify discharge learning needs (meds, wound care, etc )  - Arrange for interpretive services to assist at discharge as needed  - Refer to Case Management Department for coordinating discharge planning if the patient needs post-hospital services based on physician/advanced practitioner order or complex needs related to functional status, cognitive ability, or social support system  Outcome: Progressing     Problem: Knowledge Deficit  Goal: Patient/family/caregiver demonstrates understanding of disease process, treatment plan, medications, and discharge instructions  Description: Complete learning assessment and assess knowledge base    Interventions:  - Provide teaching at level of understanding  - Provide teaching via preferred learning methods  Outcome: Progressing

## 2022-03-28 ENCOUNTER — TELEPHONE (OUTPATIENT)
Dept: CARDIOLOGY CLINIC | Facility: CLINIC | Age: 62
End: 2022-03-28

## 2022-03-28 NOTE — TELEPHONE ENCOUNTER
P/c'd for RTW note  He is scheduled to go back to work on 4/5/22  He feels he does not need restrictions  Please send to [phone]   Fax # [phone]      Thank you

## 2022-04-20 ENCOUNTER — OFFICE VISIT (OUTPATIENT)
Dept: CARDIOLOGY CLINIC | Facility: CLINIC | Age: 62
End: 2022-04-20
Payer: COMMERCIAL

## 2022-04-20 VITALS
BODY MASS INDEX: 28.71 KG/M2 | HEIGHT: 72 IN | WEIGHT: 212 LBS | HEART RATE: 86 BPM | SYSTOLIC BLOOD PRESSURE: 142 MMHG | DIASTOLIC BLOOD PRESSURE: 84 MMHG

## 2022-04-20 DIAGNOSIS — I48.92 ATRIAL FLUTTER, UNSPECIFIED TYPE (HCC): Primary | ICD-10-CM

## 2022-04-20 PROCEDURE — 99214 OFFICE O/P EST MOD 30 MIN: CPT | Performed by: PHYSICIAN ASSISTANT

## 2022-04-20 PROCEDURE — 93000 ELECTROCARDIOGRAM COMPLETE: CPT | Performed by: PHYSICIAN ASSISTANT

## 2022-04-20 NOTE — PROGRESS NOTES
Electrophysiology Office Note    Chetopa End  1960  [id number]  HEART & VASCULAR [facility] [place]  [address]        Assessment/Plan     Primary diagnosis:   1   paroxysmal atrial fibrillation/atrial flutter, symptomatic    * patient presents to [facility] EP office today for post ablation follow up, he is a patient of Dr Vini Quick    * today ECG showing NSR without abnormalities    * on eliquis for [facility] without bleeding or cost issues ; [id number] is 1    * EF in 2019 was 55% without any MV issues    * s/p ablation as below    * Today we discussed non cardiac modifiable AF risk factors to prevent further substrate formation    1  HTN - slightly elevated in office today, at home BP closer to 130/80  2  T2DM - does not have      3  KRZYSZTOF - no formal sleep study but does not have unrefreshed sleep      4  Alcohol intake- intakes two 5oz glasses of wine nightly  Discussed AHA recommendation to have less <2 alcoholic beverages per day  He understands  5  Obesity - BMI is 28 75 today weight is 212lbs  He walks daily with his dogs, 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     6  Tobacco use - does not smoke    * overall I think this patient will do extremely well in NSR post ablation  He has no major modifiable risk factors we need to improve on  Congratulated him on his health efforts  * have him f/u in 1 year with Dr Vini Quick for routine evaluation       Secondary diagnosis:   1  HTN               Rhythm History:   Atrial fibrillation:   1  Diagnosed with atrial fibrillation and atrial flutter - initiated on eliquis and diltiazem - 2018   2  Consult by dr Gabriella Alvarez for atrial flutter - continued medical management due to low rhythm burden - 2019   3  zio XT ordered due to increasing palpitations - 2/2022   4  zio XT showing PAF and atrial flutter - ablation recommended - 3/2022   5  S/p cryo PVI by Dr Vini Quick - 3/2022    Atrial flutter:   1   S/p CTI RFA by Dr Hermilo Birmingham - 3/2022     SVT:     VT/VF:     Device history:   Pacemaker:    Defibrillator:    BIV PPM:    BIV ICD:    ILR:      Cardiac Testing:     ECHO: Results for orders placed during the hospital encounter of 19    Echo complete with contrast if indicated    T[address]  [address]  [phone]    Transthoracic Echocardiogram  2D, M-mode, Doppler, and Color Doppler    Study date:  2019    Patient: Gretel Bassett  MR number: [id number]  Account number: [de-identified]  : 1960  Age: 60 years  Gender: Male  Status: Outpatient  Location: [address] [place] [address] [facility] [place]  Height: 73 in  Weight: 202 lb  BP: 139/ 79 mmHg    Indications: Atrial flutter    Diagnoses: I48 1 - Atrial flutter    Sonographer:  LAZARO Roger  Interpreting Physician:  Lisette Casas DO  Primary Physician:  Dawayne Gardener Daryle Goodie, DO  Referring Physician:  Lady Welch DO  Group:  [facility]  cc:  Maria A Vogel DO  Cardiology Fellow:  Scar Weinstein MD    SUMMARY    LEFT VENTRICLE:  Systolic function was normal  Ejection fraction was estimated to be 55 %  There were no regional wall motion abnormalities  RIGHT VENTRICLE:  The size was normal   Systolic function was normal     HISTORY: PRIOR HISTORY: Hypertension    PROCEDURE: The study was performed in the [address] [facility] [place]  This was a routine study  The transthoracic approach was used  The study included complete 2D imaging, M-mode, complete spectral Doppler, and color Doppler  The  heart rate was 68 bpm, at the start of the study  Images were obtained from the parasternal, apical, subcostal, and suprasternal notch acoustic windows  Echocardiographic views were limited due to poor acoustic window availability  Image  quality was adequate  LEFT VENTRICLE: Size was normal  Systolic function was normal  Ejection fraction was estimated to be 55 %   There were no regional wall motion abnormalities  Wall thickness was normal  DOPPLER: Left ventricular diastolic function parameters  were normal for the patient's age  RIGHT VENTRICLE: The size was normal  Systolic function was normal  Wall thickness was normal     LEFT ATRIUM: Size was normal     RIGHT ATRIUM: Size was normal     MITRAL VALVE: Valve structure was normal  There was normal leaflet separation  DOPPLER: The transmitral velocity was within the normal range  There was no evidence for stenosis  There was trace regurgitation  AORTIC VALVE: The valve was trileaflet  Leaflets exhibited mildly increased thickness, mild calcification, and normal cuspal separation  DOPPLER: Transaortic velocity was within the normal range  There was no evidence for stenosis  There  was no significant regurgitation  TRICUSPID VALVE: The valve structure was normal  There was normal leaflet separation  DOPPLER: The transtricuspid velocity was within the normal range  There was no evidence for stenosis  There was no significant regurgitation  PULMONIC VALVE: Leaflets exhibited normal thickness, no calcification, and normal cuspal separation  DOPPLER: The transpulmonic velocity was within the normal range  There was no significant regurgitation  PERICARDIUM: There was no pericardial effusion  AORTA: The root exhibited normal size  SYSTEMIC VEINS: IVC: The inferior vena cava was normal in size      SYSTEM MEASUREMENT TABLES    2D  %FS: 30 2 %  Ao Diam: 3 29 cm  EDV(Teich): 120 65 ml  EF(Cube): 65 99 %  EF(Teich): 57 24 %  ESV(Cube): 43 64 ml  ESV(Teich): 51 59 ml  IVSd: 0 88 cm  LA Area: 17 26 cm2  LA Diam: 3 48 cm  LVEDV MOD A4C: 109 23 ml  LVEF MOD A4C: 49 2 %  LVESV MOD A4C: 55 5 ml  LVIDd: 5 04 cm  LVIDs: 3 52 cm  LVLd A4C: 7 81 cm  LVLs A4C: 6 85 cm  LVPWd: 0 97 cm  RA Area: 13 92 cm2  RV Diam : 3 75 cm  SV MOD A4C: 53 74 ml  SV(Cube): 84 66 ml  SV(Teich): 69 06 ml    CW  TR Vmax: 2 18 m/s  TR maxP 08 mmHg    MM  TAPSE: 1 98 cm    PW  E': 0 06 m/s  E/E': 8 6  MV A Moses: 0 51 m/s  MV Dec Custer: 1 8 m/s2  MV DecT: 281 82 ms  MV E Moses: 0 51 m/s  MV E/A Ratio: 1    Intersocietal Commission Accredited Echocardiography Laboratory    Prepared and electronically signed by    Mathew Hardin DO  Signed 2019 10:03:56      CATH/STRESS TEST:   See report from 2019    EKG:   NSR no abnormalities         History of Present Illness     HPI/INTERVAL HISTORY: Chace Heart is a 57 y o  male with history as above who presents to [facility] EP office today under direction of Dr Connor Chau for post afib ablation  Since returning home from his atrial fibrillation he has no new concerns or complaints  No recurrent AF  Review of Systems  ROS as noted above, otherwise 12 point review of systems was performed and is negative  Historical Information   Social History     Socioeconomic History    Marital status: Single     Spouse name: Not on file    Number of children: Not on file    Years of education: Not on file    Highest education level: Not on file   Occupational History    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[employer] , dispatch     Employer: [employer]   Tobacco Use    Smoking status: Former Smoker     Packs/day: 1 00     Years: 15 00     Pack years: 15 00     Types: Cigarettes     Quit date: 2014     Years since quittin 3    Smokeless tobacco: Never Used   Vaping Use    Vaping Use: Never used   Substance and Sexual Activity    Alcohol use:  Yes     Alcohol/week: 7 0 standard drinks     Types: 7 Glasses of wine per week     Comment: 1 glass of wine daily    Drug use: No    Sexual activity: Not on file   Other Topics Concern    Not on file   Social History Narrative    Recreational activities:  Walking     Social Determinants of Health     Financial Resource Strain: Not on file   Food Insecurity: Not on file   Transportation Needs: Not on file   Physical Activity: Not on file   Stress: Not on file   Social Connections: Not on file   Intimate Partner Violence: Not on file   Housing Stability: Not on file     Past Medical History:   Diagnosis Date    A-fib [facility])     Hyperlipidemia     Hypertension      Past Surgical History:   Procedure Laterality Date    CARDIAC ELECTROPHYSIOLOGY PROCEDURE N/A 3/23/2022    Procedure: Cardiac eps/afib ablation;  Surgeon: Salo Fagan DO;  Location: [facility]; Service: Cardiology    CARDIAC ELECTROPHYSIOLOGY PROCEDURE N/A 3/23/2022    Procedure: Cardiac eps/aflutter ablation;  Surgeon: Salo Fagan DO;  Location: [facility];   Service: Cardiology    CHOLECYSTECTOMY      COLONOSCOPY      x2 - last was 2008 (neg)    NOSE SURGERY      See ENT note 2016     Social History     Substance and Sexual Activity   Alcohol Use Yes    Alcohol/week: 7 0 standard drinks    Types: 7 Glasses of wine per week    Comment: 1 glass of wine daily     Social History     Substance and Sexual Activity   Drug Use No     Social History     Tobacco Use   Smoking Status Former Smoker    Packs/day: 1 00    Years: 15     Pack years: 15     Types: Cigarettes    Quit date: 2014    Years since quittin 3   Smokeless Tobacco Never Used     Family History   Problem Relation Age of Onset    Other Mother         Sarcoma    Hypertension Father        Meds/Allergies       Current Outpatient Medications:     apixaban (Eliquis) 5 mg, Take 1 tablet (5 mg total) by mouth 2 (two) times a day, Disp: 180 tablet, Rfl: 3    atorvastatin (LIPITOR) 10 mg tablet, Take 1 tablet (10 mg total) by mouth daily, Disp: 90 tablet, Rfl: 3    diltiazem (CARDIZEM CD) 180 mg 24 hr capsule, Take 1 capsule (180 mg total) by mouth daily, Disp: 90 capsule, Rfl: 3    fluticasone (FLONASE) 50 mcg/act nasal spray, 2 sprays into each nostril daily, Disp: , Rfl:     losartan (COZAAR) 25 mg tablet, Take 1 tablet (25 mg total) by mouth daily, Disp: 90 tablet, Rfl: 3    Allergies   Allergen Reactions    Dust Mite Extract     Lisinopril Edema     Annotation - [id number]: lip swelling x 2 2015    Short Ragweed Pollen Ext        Objective   Vitals: Blood pressure 142/84, pulse 86, height 6' (1 829 m), weight 96 2 kg (212 lb)  Physical Exam  Constitutional:       Appearance: He is well-developed  HENT:      Head: Normocephalic and atraumatic  Eyes:      Pupils: Pupils are equal, round, and reactive to light  Cardiovascular:      Rate and Rhythm: Normal rate and regular rhythm  Pulmonary:      Effort: Pulmonary effort is normal       Breath sounds: Normal breath sounds  Abdominal:      General: Bowel sounds are normal       Palpations: Abdomen is soft  Musculoskeletal:         General: Normal range of motion  Cervical back: Normal range of motion and neck supple  Skin:     General: Skin is warm and dry  Neurological:      Mental Status: He is alert and oriented to person, place, and time         Labs:  Admission on 03/23/2022, Discharged on 03/24/2022   Component Date Value    Sodium 03/23/2022 139     Potassium 03/23/2022 4 2     Chloride 03/23/2022 111*    CO2 03/23/2022 24     ANION GAP 03/23/2022 4     BUN 03/23/2022 17     Creatinine 03/23/2022 0 96     Glucose 03/23/2022 103     Glucose, Fasting 03/23/2022 103*    Calcium 03/23/2022 8 9     eGFR 03/23/2022 84     WBC 03/23/2022 6 70     RBC 03/23/2022 4 35     Hemoglobin 03/23/2022 14 2     Hematocrit 03/23/2022 40 8     MCV 03/23/2022 94     MCH 03/23/2022 32 6     MCHC 03/23/2022 34 8     RDW 03/23/2022 13 1     MPV 03/23/2022 9 3     Platelets 98/59/6598 236     nRBC 03/23/2022 0     Neutrophils Relative 03/23/2022 53     Immat GRANS % 03/23/2022 0     Lymphocytes Relative 03/23/2022 35     Monocytes Relative 03/23/2022 8     Eosinophils Relative 03/23/2022 3     Basophils Relative 03/23/2022 1     Neutrophils Absolute 03/23/2022 3 57     Immature Grans Absolute 03/23/2022 0 02     Lymphocytes Absolute 03/23/2022 2 31     Monocytes Absolute 03/23/2022 0 53     Eosinophils Absolute 03/23/2022 0 23     Basophils Absolute 03/23/2022 0 04     Protime 03/23/2022 13 3     INR 03/23/2022 1 05     PTT 03/23/2022 30     SARS-CoV-2 03/23/2022 Negative     INFLUENZA A PCR 03/23/2022 Negative     INFLUENZA B PCR 03/23/2022 Negative     RSV PCR 03/23/2022 Negative     Activated Clotting Time,* 03/23/2022 313*    Specimen Type 03/23/2022 VENOUS     Activated Clotting Time,* 03/23/2022 292*    Specimen Type 03/23/2022 VENOUS     Activated Clotting Time,* 03/23/2022 356*    Specimen Type 03/23/2022 VENOUS     Activated Clotting Time,* 03/23/2022 341*    Specimen Type 03/23/2022 VENOUS     Sodium 03/24/2022 139     Potassium 03/24/2022 4 0     Chloride 03/24/2022 110*    CO2 03/24/2022 23     ANION GAP 03/24/2022 6     BUN 03/24/2022 20     Creatinine 03/24/2022 0 95     Glucose 03/24/2022 132     Calcium 03/24/2022 8 0*    eGFR 03/24/2022 85     WBC 03/24/2022 13 17*    RBC 03/24/2022 3 76*    Hemoglobin 03/24/2022 12 4     Hematocrit 03/24/2022 35 4*    MCV 03/24/2022 94     MCH 03/24/2022 33 0     MCHC 03/24/2022 35 0     RDW 03/24/2022 13 1     Platelets 14/38/4530 217     MPV 03/24/2022 9 5    Hospital Outpatient Visit on 03/23/2022   Component Date Value    Ventricular Rate 03/23/2022 86     Atrial Rate 03/23/2022 86     DC Interval 03/23/2022 138     QRSD Interval 03/23/2022 92     QT Interval 03/23/2022 358     QTC Interval 03/23/2022 429     P Axis 03/23/2022 62     QRS Axis 03/23/2022 47     T Wave Axis 03/23/2022 68     Ventricular Rate 03/23/2022 83     Atrial Rate 03/23/2022 83     DC Interval 03/23/2022 142     QRSD Interval 03/23/2022 92     QT Interval 03/23/2022 363     QTC Interval 03/23/2022 427     P Axis 03/23/2022 68     QRS Axis 03/23/2022 50     T Wave Axis 03/23/2022 44    Orders Only on 03/14/2022   Component Date Value    WBC 03/15/2022 7 10     RBC 03/15/2022 4 37     Hemoglobin 03/15/2022 14 1     Hematocrit 03/15/2022 41 0     MCV 03/15/2022 94     MCH 03/15/2022 32 3     MCHC 03/15/2022 34 4     RDW 03/15/2022 13 2     MPV 03/15/2022 9 4     Platelets 19/40/8996 228     nRBC 03/15/2022 0     Neutrophils Relative 03/15/2022 52     Immat GRANS % 03/15/2022 0     Lymphocytes Relative 03/15/2022 36     Monocytes Relative 03/15/2022 8     Eosinophils Relative 03/15/2022 3     Basophils Relative 03/15/2022 1     Neutrophils Absolute 03/15/2022 3 69     Immature Grans Absolute 03/15/2022 0 01     Lymphocytes Absolute 03/15/2022 2 55     Monocytes Absolute 03/15/2022 0 58     Eosinophils Absolute 03/15/2022 0 22     Basophils Absolute 03/15/2022 0 05     Sodium 03/15/2022 138     Potassium 03/15/2022 4 4     Chloride 03/15/2022 107     CO2 03/15/2022 29     ANION GAP 03/15/2022 2*    BUN 03/15/2022 12     Creatinine 03/15/2022 1 04     Glucose, Fasting 03/15/2022 104*    Calcium 03/15/2022 8 9     AST 03/15/2022 18     ALT 03/15/2022 33     Alkaline Phosphatase 03/15/2022 87     Total Protein 03/15/2022 7 3     Albumin 03/15/2022 3 6     Total Bilirubin 03/15/2022 0 62     eGFR 03/15/2022 76        Imaging: I have personally reviewed pertinent reports

## 2022-05-26 ENCOUNTER — OFFICE VISIT (OUTPATIENT)
Dept: FAMILY MEDICINE CLINIC | Facility: CLINIC | Age: 62
End: 2022-05-26
Payer: COMMERCIAL

## 2022-05-26 VITALS
HEART RATE: 111 BPM | BODY MASS INDEX: 28.71 KG/M2 | DIASTOLIC BLOOD PRESSURE: 88 MMHG | OXYGEN SATURATION: 97 % | SYSTOLIC BLOOD PRESSURE: 120 MMHG | HEIGHT: 72 IN | TEMPERATURE: 97.2 F | WEIGHT: 212 LBS | RESPIRATION RATE: 18 BRPM

## 2022-05-26 DIAGNOSIS — Z11.9 ENCOUNTER FOR SCREENING FOR INFECTIOUS AND PARASITIC DISEASES, UNSPECIFIED: ICD-10-CM

## 2022-05-26 DIAGNOSIS — K52.9 GASTROENTERITIS: Primary | ICD-10-CM

## 2022-05-26 PROCEDURE — 99213 OFFICE O/P EST LOW 20 MIN: CPT | Performed by: FAMILY MEDICINE

## 2022-05-26 PROCEDURE — 87636 SARSCOV2 & INF A&B AMP PRB: CPT | Performed by: FAMILY MEDICINE

## 2022-05-26 RX ORDER — ONDANSETRON 4 MG/1
4 TABLET, ORALLY DISINTEGRATING ORAL EVERY 6 HOURS PRN
Qty: 10 TABLET | Refills: 0 | Status: SHIPPED | OUTPATIENT
Start: 2022-05-26 | End: 2022-06-03 | Stop reason: ALTCHOICE

## 2022-05-26 NOTE — PROGRESS NOTES
Assessment/Plan:    He appears to have gastroenteritis  Will treat with Zofran along with recommendations for brat diet if he starts to feel better  COVID PCR was sent to the lab  No problem-specific Assessment & Plan notes found for this encounter  Diagnoses and all orders for this visit:    Gastroenteritis  -     ondansetron (Zofran ODT) 4 mg disintegrating tablet; Take 1 tablet (4 mg total) by mouth every 6 (six) hours as needed for nausea or vomiting    Encounter for screening for infectious and parasitic diseases, unspecified  -     Covid/Flu- Office Collect          Subjective:      Patient ID: Jesenia Infante is a 57 y o  male  He developed nausea vomiting and diarrhea last night around 11:00 p m  He had gone to bed around 9:00 p m  and felt completely normal but then woke up with these symptoms  He was awake most of the night  He finally fell asleep for a few hours  When he awoke he was able to drink water but he had some black coffee and this started GI symptoms again  No fever but chills  No headaches or body aches  His SO no symptoms and they ate the same food for dinner      The following portions of the patient's history were reviewed and updated as appropriate: allergies, current medications, past family history, past medical history, past social history, past surgical history and problem list     Review of Systems   Constitutional: Positive for fatigue  Negative for activity change, chills and fever  HENT: Negative for congestion, ear pain, sinus pressure and sore throat  Eyes: Negative for pain and visual disturbance  Respiratory: Negative for cough, chest tightness, shortness of breath and wheezing  Cardiovascular: Negative for chest pain, palpitations and leg swelling  Gastrointestinal: Positive for diarrhea, nausea and vomiting  Negative for abdominal pain, blood in stool and constipation  Endocrine: Negative for polydipsia and polyuria     Genitourinary: Negative for difficulty urinating, dysuria, frequency and urgency  Musculoskeletal: Negative for arthralgias, joint swelling and myalgias  Skin: Negative for rash  Neurological: Negative for dizziness, weakness, numbness and headaches  Hematological: Negative for adenopathy  Does not bruise/bleed easily  Psychiatric/Behavioral: Negative for dysphoric mood  The patient is not nervous/anxious  Objective:      /88 (BP Location: Left arm, Patient Position: Sitting, Cuff Size: Large)   Pulse (!) 111   Temp (!) 97 2 °F (36 2 °C) (Temporal)   Resp 18   Ht 6' (1 829 m)   Wt 96 2 kg (212 lb)   SpO2 97%   BMI 28 75 kg/m²          Physical Exam  Vitals and nursing note reviewed  Constitutional:       Appearance: Normal appearance  HENT:      Head: Normocephalic and atraumatic  Mouth/Throat:      Mouth: Mucous membranes are moist    Cardiovascular:      Rate and Rhythm: Normal rate and regular rhythm  Pulmonary:      Effort: Pulmonary effort is normal       Breath sounds: Normal breath sounds  Musculoskeletal:      Right lower leg: No edema  Left lower leg: No edema  Skin:     General: Skin is warm and dry  Neurological:      Mental Status: He is alert     Psychiatric:         Mood and Affect: Mood normal          Behavior: Behavior normal

## 2022-05-26 NOTE — LETTER
May 26, 2022     Patient: Vikas Gonzales  YOB: 1960  Date of Visit: 5/26/2022      To Whom it May Concern:    Sapna Martin is under my professional care  Carri Astudillo was seen in my office on 5/26/2022  Carri Rasamy may return to work on Tuesday 5/31/2022  If you have any questions or concerns, please don't hesitate to call           Sincerely,          Wilfredo Ellis MD        CC: No Recipients

## 2022-05-27 LAB
FLUAV RNA RESP QL NAA+PROBE: NEGATIVE
FLUBV RNA RESP QL NAA+PROBE: NEGATIVE
SARS-COV-2 RNA RESP QL NAA+PROBE: NEGATIVE

## 2022-06-02 PROBLEM — Z86.79 S/P ABLATION OF ATRIAL FIBRILLATION: Status: ACTIVE | Noted: 2022-06-02

## 2022-06-02 PROBLEM — Z98.890 S/P ABLATION OF ATRIAL FIBRILLATION: Status: ACTIVE | Noted: 2022-06-02

## 2022-06-02 PROBLEM — Z98.890 S/P ABLATION OF ATRIAL FIBRILLATION: Status: ACTIVE | Noted: 2022-03-16

## 2022-06-02 PROBLEM — Z86.79 S/P ABLATION OF ATRIAL FIBRILLATION: Status: ACTIVE | Noted: 2022-03-16

## 2022-06-03 ENCOUNTER — APPOINTMENT (OUTPATIENT)
Dept: LAB | Facility: MEDICAL CENTER | Age: 62
End: 2022-06-03
Payer: COMMERCIAL

## 2022-06-03 ENCOUNTER — OFFICE VISIT (OUTPATIENT)
Dept: FAMILY MEDICINE CLINIC | Facility: CLINIC | Age: 62
End: 2022-06-03
Payer: COMMERCIAL

## 2022-06-03 VITALS
BODY MASS INDEX: 27.77 KG/M2 | DIASTOLIC BLOOD PRESSURE: 80 MMHG | WEIGHT: 205 LBS | OXYGEN SATURATION: 95 % | SYSTOLIC BLOOD PRESSURE: 130 MMHG | TEMPERATURE: 97.9 F | HEIGHT: 72 IN | HEART RATE: 87 BPM

## 2022-06-03 DIAGNOSIS — Z13.1 DIABETES MELLITUS SCREENING: ICD-10-CM

## 2022-06-03 DIAGNOSIS — Z79.01 ANTICOAGULATED: ICD-10-CM

## 2022-06-03 DIAGNOSIS — E78.00 HYPERCHOLESTEROLEMIA: ICD-10-CM

## 2022-06-03 DIAGNOSIS — Z12.5 SCREENING PSA (PROSTATE SPECIFIC ANTIGEN): ICD-10-CM

## 2022-06-03 DIAGNOSIS — R91.1 PULMONARY NODULE: ICD-10-CM

## 2022-06-03 DIAGNOSIS — Z86.79 S/P ABLATION OF ATRIAL FIBRILLATION: ICD-10-CM

## 2022-06-03 DIAGNOSIS — Z13.220 LIPID SCREENING: ICD-10-CM

## 2022-06-03 DIAGNOSIS — Z87.891 FORMER SMOKER: ICD-10-CM

## 2022-06-03 DIAGNOSIS — Z98.890 S/P ABLATION OF ATRIAL FIBRILLATION: ICD-10-CM

## 2022-06-03 DIAGNOSIS — I10 ESSENTIAL HYPERTENSION: ICD-10-CM

## 2022-06-03 DIAGNOSIS — Z00.00 ENCOUNTER FOR ANNUAL PHYSICAL EXAM: Primary | ICD-10-CM

## 2022-06-03 LAB
CHOLEST SERPL-MCNC: 117 MG/DL
EST. AVERAGE GLUCOSE BLD GHB EST-MCNC: 108 MG/DL
HBA1C MFR BLD: 5.4 %
HDLC SERPL-MCNC: 45 MG/DL
LDLC SERPL CALC-MCNC: 59 MG/DL (ref 0–100)
NONHDLC SERPL-MCNC: 72 MG/DL
PSA SERPL-MCNC: 0.9 NG/ML (ref 0–4)
TRIGL SERPL-MCNC: 63 MG/DL

## 2022-06-03 PROCEDURE — G0103 PSA SCREENING: HCPCS

## 2022-06-03 PROCEDURE — 80061 LIPID PANEL: CPT | Performed by: FAMILY MEDICINE

## 2022-06-03 PROCEDURE — 36415 COLL VENOUS BLD VENIPUNCTURE: CPT

## 2022-06-03 PROCEDURE — 99396 PREV VISIT EST AGE 40-64: CPT | Performed by: FAMILY MEDICINE

## 2022-06-03 PROCEDURE — 83036 HEMOGLOBIN GLYCOSYLATED A1C: CPT | Performed by: FAMILY MEDICINE

## 2022-06-03 NOTE — PROGRESS NOTES
BMI Counseling: Body mass index is 27 8 kg/m²  The BMI is above normal  Nutrition recommendations include encouraging healthy choices of fruits and vegetables  Exercise recommendations include exercising 3-5 times per week  Rationale for BMI follow-up plan is due to patient being overweight or obese  FAMILY PRACTICE OFFICE VISIT  [facility]  [address]  [address], [place], [address]      NAME: Greyson Celaya  AGE: 57 y o  SEX: male  : 1960   MRN: [id number]    DATE: 6/3/2022  TIME: 10:46 AM    Assessment and Plan     Problem List Items Addressed This Visit     Hypercholesterolemia    Essential hypertension    Pulmonary nodule    Relevant Orders    CT chest wo contrast    Anticoagulated    Former smoker    Relevant Orders    CT chest wo contrast    S/P ablation of atrial fibrillation      Other Visit Diagnoses     Encounter for annual physical exam    -  Primary    Lipid screening        Relevant Orders    Lipid panel    Diabetes mellitus screening        Relevant Orders    Hemoglobin A1C    Screening PSA (prostate specific antigen)        Relevant Orders    PSA, Total Screen    BMI 27              Patient Instructions     Health history along with medications reviewed, he did have recent gastroenteritis,  that has resolved  He did drop some weight with illness  Reviewed visits with Cardiology, he did well with ablation in March regarding atrial fib/flutter, continues on Eliquis  Regular sinus rhythm here today  Blood pressure today is acceptable at 130/80  Continue Diltiazem 180 Mg daily, Losartan 25 mg daily  We did review previous blood work, he did have blood work done during hospital stay for ablation in March, hemoglobin initially 14 2, CMP unremarkable with borderline glucose 104  Creatinine 0 95      He does do lipids along with A1c for work, insurance, cholesterol last year was excellent 143 with HDL 58, LDL 68  Await redo testing  Continue Atorvastatin as is    Immunization History   Administered Date(s) Administered    COVID-19 MODERNA VACC 0 5 ML IM 12/28/2020, 01/24/2021, 11/01/2021, 05/03/2022    INFLUENZA 10/05/2015, 10/01/2018, 10/19/2020, 10/01/2021    Influenza Quadrivalent Preservative Free 3 years and older IM 10/30/2014, 10/01/2016, 10/01/2018    Influenza Quadrivalent, 6-35 Months IM 10/20/2015    Tdap 02/01/2015    Zoster Vaccine Recombinant 12/24/2019, 05/08/2020       He does do yearly Flu shot  Tdap/tetanus shot is up to date  (done every 10 yrs for superficial cuts, every 5 yrs for deep wounds)  He previously received Shingrix   COVID vaccinations up-to-date    Quit smoking 2014, 15 pack years  History pulmonary nodule 5 mm lingular on CT December 2018, discussed nodules are very common, he will do CT of chest without dye  Regarding Colon Cancer screening, he has had 3 colonoscopies in the past, his last in 2015 was normal   Can redo 2025  Sooner if indicated  Discussed Prostate Cancer screening pros/cons starting at age 48  PSA blood test was 1 0  1 year ago, await redo  Incidental small kidney stone on CT 2018, keep hydrated, watch for flank pain  Regarding Hepatitis C Screening -  previously perfomed and was negative  Continue to try to watch healthy diet, exercise routinely  He does see Dermatology yearly, he also saw his eye doctor earlier today  We will see him again in 12 months, sooner as needed  Chief Complaint     Chief Complaint   Patient presents with    Physical Exam       History of Present Illness   Greyson Celaya is a 57y o -year-old male who Is in today for a routine checkup, he has been feeling very well  He did undergo ablation regarding AFib, flutter back in March, is on Eliquis, other medication, notes no palpitations, lightheadedness, chest discomfort nor other issues      He did have GI bug late May, symptoms have resolved, did lose weight, appetite back to normal      Review of Systems   Review of Systems   Constitutional: Negative for appetite change, fatigue, fever and unexpected weight change  HENT: Negative for sore throat and trouble swallowing  Respiratory: Negative for cough, chest tightness and shortness of breath  Cardiovascular: Negative for chest pain, palpitations and leg swelling  Gastrointestinal: Negative for abdominal pain and blood in stool  No acid reflux     No change in bowel versus baseline at present other than recent GI bug  No further nausea, vomiting, diarrhea   Genitourinary: Negative for dysuria and hematuria  Neurological: Negative for dizziness, syncope, light-headedness and headaches  Psychiatric/Behavioral: Negative for behavioral problems and confusion  Active Problem List     Patient Active Problem List   Diagnosis    Allergic rhinitis    Hypercholesterolemia    Essential hypertension    Migraine headache    Nasal polyp    BPH associated with nocturia    Kidney stone    Pulmonary nodule    Atrial flutter (HCC)    Anticoagulated    Former smoker    Palpitations    S/P ablation of atrial fibrillation       Past Medical History:  Reviewed    Past Surgical History:  Reviewed    Family History:  Reviewed    Social History:  Reviewed    Objective     Vitals:    06/03/22 0958   BP: 130/80   BP Location: Left arm   Patient Position: Sitting   Cuff Size: Large   Pulse: 87   Temp: 97 9 °F (36 6 °C)   SpO2: 95%   Weight: 93 kg (205 lb)   Height: 6' (1 829 m)     Body mass index is 27 8 kg/m²  BP Readings from Last 3 Encounters:   06/03/22 130/80   05/26/22 120/88   04/20/22 142/84       Wt Readings from Last 3 Encounters:   06/03/22 93 kg (205 lb)   05/26/22 96 2 kg (212 lb)   04/20/22 96 2 kg (212 lb)       Physical Exam  Constitutional:       General: He is not in acute distress  Appearance: Normal appearance  He is well-developed  He is not ill-appearing     HENT: Right Ear: Tympanic membrane normal       Left Ear: Tympanic membrane normal    Eyes:      General: No scleral icterus  Neck:      Vascular: No carotid bruit  Cardiovascular:      Rate and Rhythm: Normal rate and regular rhythm  Heart sounds: Normal heart sounds  No murmur heard  Pulmonary:      Effort: Pulmonary effort is normal  No respiratory distress  Breath sounds: Normal breath sounds  No wheezing, rhonchi or rales  Abdominal:      Palpations: Abdomen is soft  Tenderness: There is no abdominal tenderness  Musculoskeletal:      Right lower leg: No edema  Left lower leg: No edema  Lymphadenopathy:      Cervical: No cervical adenopathy  Skin:     Coloration: Skin is not jaundiced  Neurological:      Mental Status: He is alert and oriented to person, place, and time  Mental status is at baseline  Psychiatric:         Mood and Affect: Mood normal          Behavior: Behavior normal          ALLERGIES:  Allergies   Allergen Reactions    Dust Mite Extract     Lisinopril Edema     Annotation - [id number]: lip swelling x 2 2015    Short Ragweed Pollen Ext        Current Medications     Current Outpatient Medications   Medication Sig Dispense Refill    apixaban (Eliquis) 5 mg Take 1 tablet (5 mg total) by mouth 2 (two) times a day 180 tablet 3    atorvastatin (LIPITOR) 10 mg tablet Take 1 tablet (10 mg total) by mouth daily 90 tablet 3    diltiazem (CARDIZEM CD) 180 mg 24 hr capsule Take 1 capsule (180 mg total) by mouth daily 90 capsule 3    fluticasone (FLONASE) 50 mcg/act nasal spray 2 sprays into each nostril daily      losartan (COZAAR) 25 mg tablet Take 1 tablet (25 mg total) by mouth daily 90 tablet 3     No current facility-administered medications for this visit              Orders Placed This Encounter   Procedures    CT chest wo contrast    Lipid panel    Hemoglobin A1C    PSA, Total Screen         Carmita Dyer DO

## 2022-06-03 NOTE — PATIENT INSTRUCTIONS
Health history along with medications reviewed, he did have recent gastroenteritis,  that has resolved  He did drop some weight with illness  Reviewed visits with Cardiology, he did well with ablation in March regarding atrial fib/flutter, continues on Eliquis  Regular sinus rhythm here today  Blood pressure today is acceptable at 130/80  Continue Diltiazem 180 Mg daily, Losartan 25 mg daily  We did review previous blood work, he did have blood work done during hospital stay for ablation in March, hemoglobin initially 14 2, CMP unremarkable with borderline glucose 104  Creatinine 0 95  He does do lipids along with A1c for work, insurance, cholesterol last year was excellent 143 with HDL 58, LDL 68  Await redo testing  Continue Atorvastatin as is    Immunization History   Administered Date(s) Administered    COVID-19 MODERNA VACC 0 5 ML IM 12/28/2020, 01/24/2021, 11/01/2021, 05/03/2022    INFLUENZA 10/05/2015, 10/01/2018, 10/19/2020, 10/01/2021    Influenza Quadrivalent Preservative Free 3 years and older IM 10/30/2014, 10/01/2016, 10/01/2018    Influenza Quadrivalent, 6-35 Months IM 10/20/2015    Tdap 02/01/2015    Zoster Vaccine Recombinant 12/24/2019, 05/08/2020       He does do yearly Flu shot  Tdap/tetanus shot is up to date  (done every 10 yrs for superficial cuts, every 5 yrs for deep wounds)  He previously received Shingrix   COVID vaccinations up-to-date    Quit smoking 2014, 15 pack years  History pulmonary nodule 5 mm lingular on CT December 2018, discussed nodules are very common, he will do CT of chest without dye  Regarding Colon Cancer screening, he has had 3 colonoscopies in the past, his last in 2015 was normal   Can redo 2025  Sooner if indicated  Discussed Prostate Cancer screening pros/cons starting at age 48  PSA blood test was 1 0  1 year ago, await redo  Incidental small kidney stone on CT 2018, keep hydrated, watch for flank pain      Regarding Hepatitis C Screening -  previously perfomed and was negative  Continue to try to watch healthy diet, exercise routinely  He does see Dermatology yearly, he also saw his eye doctor earlier today  We will see him again in 12 months, sooner as needed

## 2022-06-10 ENCOUNTER — HOSPITAL ENCOUNTER (OUTPATIENT)
Dept: RADIOLOGY | Facility: HOSPITAL | Age: 62
Discharge: HOME | End: 2022-06-10
Payer: COMMERCIAL

## 2022-06-10 DIAGNOSIS — Z87.891 FORMER SMOKER: ICD-10-CM

## 2022-06-10 DIAGNOSIS — R91.1 PULMONARY NODULE: ICD-10-CM

## 2022-06-10 PROCEDURE — G1004 CDSM NDSC: HCPCS

## 2022-06-10 PROCEDURE — 71250 CT THORAX DX C-: CPT

## 2022-08-09 ENCOUNTER — TELEPHONE (OUTPATIENT)
Dept: DERMATOLOGY | Facility: CLINIC | Age: 62
End: 2022-08-09

## 2022-08-09 NOTE — TELEPHONE ENCOUNTER
Pt left voice mail to schedule his annual check up  Returned call and left message for pt to call the office to schedule an appt

## 2022-10-04 ENCOUNTER — APPOINTMENT (OUTPATIENT)
Dept: LAB | Facility: HOSPITAL | Age: 62
End: 2022-10-04
Attending: OTOLARYNGOLOGY
Payer: COMMERCIAL

## 2022-10-04 DIAGNOSIS — J34.89 NASAL OBSTRUCTION: ICD-10-CM

## 2022-10-04 DIAGNOSIS — J33.9 NASAL POLYPOSIS: ICD-10-CM

## 2022-10-04 LAB
ANION GAP SERPL CALCULATED.3IONS-SCNC: 7 MMOL/L (ref 4–13)
BASOPHILS # BLD AUTO: 0.05 THOUSANDS/ΜL (ref 0–0.1)
BASOPHILS NFR BLD AUTO: 1 % (ref 0–1)
BUN SERPL-MCNC: 17 MG/DL (ref 5–25)
CALCIUM SERPL-MCNC: 9.4 MG/DL (ref 8.3–10.1)
CHLORIDE SERPL-SCNC: 106 MMOL/L (ref 96–108)
CO2 SERPL-SCNC: 25 MMOL/L (ref 21–32)
CREAT SERPL-MCNC: 1.06 MG/DL (ref 0.6–1.3)
EOSINOPHIL # BLD AUTO: 0.16 THOUSAND/ΜL (ref 0–0.61)
EOSINOPHIL NFR BLD AUTO: 2 % (ref 0–6)
ERYTHROCYTE [DISTWIDTH] IN BLOOD BY AUTOMATED COUNT: 13.1 % (ref 11.6–15.1)
GFR SERPL CREATININE-BSD FRML MDRD: 74 ML/MIN/1.73SQ M
GLUCOSE P FAST SERPL-MCNC: 98 MG/DL (ref 65–99)
HCT VFR BLD AUTO: 43.8 % (ref 36.5–49.3)
HGB BLD-MCNC: 14.9 G/DL (ref 12–17)
IMM GRANULOCYTES # BLD AUTO: 0.01 THOUSAND/UL (ref 0–0.2)
IMM GRANULOCYTES NFR BLD AUTO: 0 % (ref 0–2)
LYMPHOCYTES # BLD AUTO: 2.93 THOUSANDS/ΜL (ref 0.6–4.47)
LYMPHOCYTES NFR BLD AUTO: 38 % (ref 14–44)
MCH RBC QN AUTO: 32.7 PG (ref 26.8–34.3)
MCHC RBC AUTO-ENTMCNC: 34 G/DL (ref 31.4–37.4)
MCV RBC AUTO: 96 FL (ref 82–98)
MONOCYTES # BLD AUTO: 0.57 THOUSAND/ΜL (ref 0.17–1.22)
MONOCYTES NFR BLD AUTO: 7 % (ref 4–12)
NEUTROPHILS # BLD AUTO: 4.01 THOUSANDS/ΜL (ref 1.85–7.62)
NEUTS SEG NFR BLD AUTO: 52 % (ref 43–75)
NRBC BLD AUTO-RTO: 0 /100 WBCS
PLATELET # BLD AUTO: 258 THOUSANDS/UL (ref 149–390)
PMV BLD AUTO: 9.4 FL (ref 8.9–12.7)
POTASSIUM SERPL-SCNC: 4.1 MMOL/L (ref 3.5–5.3)
RBC # BLD AUTO: 4.56 MILLION/UL (ref 3.88–5.62)
SODIUM SERPL-SCNC: 138 MMOL/L (ref 135–147)
WBC # BLD AUTO: 7.73 THOUSAND/UL (ref 4.31–10.16)

## 2022-10-04 PROCEDURE — 80048 BASIC METABOLIC PNL TOTAL CA: CPT

## 2022-10-04 PROCEDURE — 85025 COMPLETE CBC W/AUTO DIFF WBC: CPT

## 2022-10-04 PROCEDURE — 36415 COLL VENOUS BLD VENIPUNCTURE: CPT

## 2022-10-14 ENCOUNTER — CONSULT (OUTPATIENT)
Dept: FAMILY MEDICINE CLINIC | Facility: CLINIC | Age: 62
End: 2022-10-14
Payer: COMMERCIAL

## 2022-10-14 VITALS
DIASTOLIC BLOOD PRESSURE: 84 MMHG | HEART RATE: 88 BPM | BODY MASS INDEX: 28.31 KG/M2 | WEIGHT: 209 LBS | OXYGEN SATURATION: 97 % | HEIGHT: 72 IN | SYSTOLIC BLOOD PRESSURE: 134 MMHG

## 2022-10-14 DIAGNOSIS — Z01.811 PRE-OP CHEST EXAM: Primary | ICD-10-CM

## 2022-10-14 DIAGNOSIS — Z79.01 ANTICOAGULATED: ICD-10-CM

## 2022-10-14 PROCEDURE — 93000 ELECTROCARDIOGRAM COMPLETE: CPT | Performed by: FAMILY MEDICINE

## 2022-10-14 PROCEDURE — 99214 OFFICE O/P EST MOD 30 MIN: CPT | Performed by: FAMILY MEDICINE

## 2022-10-14 NOTE — PATIENT INSTRUCTIONS
1  Pre-op chest exam  -     POCT ECG    2  Anticoagulated     Hold losartan the day of your surgery  Hold eliquis 3 days before your surgery, restart the day after unless told otherwise by your surgeon

## 2022-10-14 NOTE — PROGRESS NOTES
Assessment/Plan:       Problem List Items Addressed This Visit        Other    Anticoagulated      Other Visit Diagnoses     Pre-op chest exam    -  Primary    Relevant Orders    POCT ECG (Completed)          1  Pre-op chest exam  EKG normal patient has high exercise tolerance  Low risk for proposed surgery, able to hold Eliquis  Reviewed recent labs  - POCT ECG    2  Anticoagulated  Discussed Eliquis hold with patient today  [id number] score of 1    Subjective:      Patient ID: Sonny Orlando is a 57 y o  male  HPI    57year old with past medical history hypertension and atrial fibrillation status post ablation presenting for pre op exam     Walks dog multiple miles at a time with no problems  Is on eliquis for AFIB, he has been in sinus rhythm since his ablation  Discussed Eliquis will today recommended hold for 3 days prior to surgery illness recommended differently by surgeon  Will hold this for surgery  Discussed risks of holding Eliquis including very small risk of stroke  EKG done today and was normal     Patient will risk for surgery    The following portions of the patient's history were reviewed and updated as appropriate: allergies, current medications, past family history, past medical history, past social history, past surgical history and problem list       Current Outpatient Medications:   •  apixaban (Eliquis) 5 mg, Take 1 tablet (5 mg total) by mouth 2 (two) times a day, Disp: 180 tablet, Rfl: 3  •  atorvastatin (LIPITOR) 10 mg tablet, Take 1 tablet (10 mg total) by mouth daily, Disp: 90 tablet, Rfl: 3  •  diltiazem (CARDIZEM CD) 180 mg 24 hr capsule, Take 1 capsule (180 mg total) by mouth daily, Disp: 90 capsule, Rfl: 3  •  fluticasone (FLONASE) 50 mcg/act nasal spray, 2 sprays into each nostril daily, Disp: , Rfl:   •  losartan (COZAAR) 25 mg tablet, Take 1 tablet (25 mg total) by mouth daily, Disp: 90 tablet, Rfl: 3     Review of Systems   Constitutional: Negative for activity change and appetite change  Respiratory: Negative for apnea and chest tightness  Gastrointestinal: Negative for abdominal distention and abdominal pain  Musculoskeletal: Negative for arthralgias and back pain  Objective:      /84   Pulse 88   Ht 6' (1 829 m)   Wt 94 8 kg (209 lb)   SpO2 97%   BMI 28 35 kg/m²          Physical Exam  Constitutional:       Appearance: Normal appearance  Cardiovascular:      Rate and Rhythm: Normal rate and regular rhythm  Pulmonary:      Effort: Pulmonary effort is normal       Breath sounds: Normal breath sounds  Musculoskeletal:         General: Normal range of motion  Neurological:      Mental Status: He is alert             Westly Lipps

## 2022-10-20 ENCOUNTER — OFFICE VISIT (OUTPATIENT)
Dept: DERMATOLOGY | Facility: CLINIC | Age: 62
End: 2022-10-20
Payer: COMMERCIAL

## 2022-10-20 VITALS — HEIGHT: 72 IN | TEMPERATURE: 98 F | WEIGHT: 211 LBS | BODY MASS INDEX: 28.58 KG/M2

## 2022-10-20 DIAGNOSIS — L81.4 LENTIGO: ICD-10-CM

## 2022-10-20 DIAGNOSIS — Z12.83 SCREENING FOR SKIN CANCER: Primary | ICD-10-CM

## 2022-10-20 PROCEDURE — 99213 OFFICE O/P EST LOW 20 MIN: CPT | Performed by: DERMATOLOGY

## 2022-10-20 NOTE — PATIENT INSTRUCTIONS
1  SCREENING FOR SKIN CANCER      Assessment and Plan:  Based on a thorough discussion of this condition and the management approach to it (including a comprehensive discussion of the known risks, side effects and potential benefits of treatment), the patient (family) agrees to implement the following specific plan:  Recommend sun protection SPF 30 or higher, wearing a wide brimmed hat and sun protective clothing    2  LENTIGO    Physical Exam:  Anatomic Location Affected:  Left medial nasal bridge    Assessment and Plan:  Based on a thorough discussion of this condition and the management approach to it (including a comprehensive discussion of the known risks, side effects and potential benefits of treatment), the patient (family) agrees to implement the following specific plan:  Begin cream from [facility] apply at night  [facility]     We use this service to prescribe medications that are often not covered by insurance  Your physician will send your prescription to the pharmacy  You will receive an email from [email or web address] where you will follow the instructions within the email to provide your billing and shipping information  Your medicine will be shipped directly to you  If you have any questions, you can call [phone] or email [email or web address]   What is a lentigo? A lentigo is a pigmented flat or slightly raised lesion with a clearly defined edge  Unlike an ephelis (freckle), it does not fade in the winter months  There are several kinds of lentigo  The name lentigo originally referred to its appearance resembling a small lentil  The plural of lentigo is lentigines, although “lentigos” is also in common use  Who gets lentigines? Lentigines can affect males and females of all ages and races  Solar lentigines are especially prevalent in fair skinned adults  Lentigines associated with syndromes are present at birth or arise during childhood      What causes lentigines? Common forms of lentigo are due to exposure to ultraviolet radiation:  Sun damage including sunburn   Indoor tanning   Phototherapy, especially photochemotherapy (PUVA)    Ionizing radiation, eg radiation therapy, can also cause lentigines  Several familial syndromes associated with widespread lentigines originate from mutations in Ruperto-MAP kinase, mTOR signaling and PTEN pathways  What are the clinical features of lentigines? Lentigines have been classified into several different types depending on what they look like, where they appear on the body, causative factors, and whether they are associated to other diseases or conditions  Lentigines may be solitary or more often, multiple  Most lentigines are smaller than 5 mm in diameter      Lentigo simplex  A precursor to junctional naevus   Arises during childhood and early adult life   Found on trunk and limbs   Small brown round or oval macule or thin plaque   Jagged or smooth edge   May have a dry surface   May disappear in time  Solar lentigo  A precursor to seborrhoeic keratosis   Found on chronically sun exposed sites such as hands, face, lower legs   May also follow sunburn to shoulders   Yellow, light or dark brown regular or irregular macule or thin plaque   May have a dry surface   Often has moth-eaten outline   Can slowly enlarge to several centimeters in diameter   May disappear, often through the process known as lichenoid keratosis   When atypical in appearance, may be difficult to distinguish from melanoma in situ  Ink spot lentigo  Also known as reticulated lentigo   Few in number compared to solar lentigines   Follows sunburn in very fair skinned individuals   Dark brown to black irregular ink spot-like macule  PUVA lentigo  Similar to ink spot lentigo but follows photochemotherapy (PUVA)   Location anywhere exposed to PUVA  Tanning bed lentigo  Similar to ink spot lentigo but follows indoor tanning   Location anywhere exposed to tanning bed  Radiation lentigo  Occurs in site of irradiation (accidental or therapeutic)   Associated with late-stage radiation dermatitis: epidermal atrophy, subcutaneous fibrosis, keratosis, telangiectasias  Melanotic macule  Mucosal surfaces or adjacent glabrous skin eg lip, vulva, penis, anus   Light to dark brown   Also called mucosal melanosis  Generalised lentigines  Found on any exposed or covered site from early childhood   Small macules may merge to form larger patches   Not associated with a syndrome   Also called lentigines profusa, multiple lentigines  Agminated lentigines  Naevoid eruption of lentigos confined to a single segmental area   Sharp demarcation in midline   May have associated neurological and developmental abnormalities  Patterned lentigines  Inherited tendency to lentigines on face, lips, buttocks, palms, soles   Recognised mainly in people of  ethnicity  Centrofacial neurodysraphic lentiginosis  Associated with mental retardation  Lentiginosis syndromes  Syndromes include LEOPARD/Flint, Peutz-Jeghers, Laugier-Hunziker, Moynahan, Xeroderma pigmentosum, myxoma syndromes (BARROS, NAME, Gregory), Ruvalcaba-Myhre-Hendrickson, Bannayan-Zonnana syndrome, Cowden disease (multiple hamartoma syndrome )   Inheritance is autosomal dominant; sporadic cases common   Widespread lentigines present at birth or arise in early childhood   Associated with neural, endocrine, and mesenchymal tumors    How is the diagnosis made? Lentigines are usually diagnosed clinically by their typical appearance  Concern regarding possibility of melanoma may lead to:  Dermatoscopy   Diagnostic excision biopsy    Histopathology of a lentigo shows:   Thickened epidermis   An increased number of melanocytes along the basal layer of epidermis   Unlike junctional melanocytic naevus, there are no nests of melanocytes   Increased melanin pigment within the keratinocytes   Additional features depending on type of lentigo    In contrast, an ephelis (freckle) shows sun-induced increased melanin within the keratinocytes, without an increase in number of cells  What is the treatment for lentigines? Most lentigines are left alone  Attempts to lighten them may not be successful  The following approaches are used:  SPF 50+ broad-spectrum sunscreen   Hydroquinone bleaching cream   Alpha hydroxy acids   Vitamin C   Retinoids   Azelaic acid   Chemical peels  Individual lesions can be permanently removed using:  Cryotherapy   Intense pulsed light   Pigment lasers    How can lentigines be prevented? Lentigines associated with exposure ultraviolet radiation can be prevented by very careful sun protection  Clothing is more successful at preventing new lentigines than are sunscreens  What is the outlook for lentigines? Lentigines usually persist  They may increase in number with age and sun exposure  Some in sun-protected sites may fade and disappear

## 2022-10-27 NOTE — PRE-PROCEDURE INSTRUCTIONS
Pre-Surgery Instructions:   Medication Instructions   • apixaban (Eliquis) 5 mg Last dose Eliquis 10-29-22 per MD   • atorvastatin (LIPITOR) 10 mg tablet Take day of surgery  • diltiazem (CARDIZEM CD) 180 mg 24 hr capsule Take day of surgery  • fluticasone (FLONASE) 50 mcg/act nasal spray Take day of surgery  • losartan (COZAAR) 25 mg tablet Take night before surgery   Pre-op medication, and showering instructions with antibacteral soap reviewed  Pt  Verbalized understanding of current visitor restrictions  Pt  Verbalized an understanding of all instructions reviewed and offers no concerns at this time  Instructed to avoid all ASA/NSAIDs and OTC Vit/Supp from now until after surgery per anesthesia guidelines   Tylenol ok prn  DOS meds with a few sips of H2o

## 2022-11-02 ENCOUNTER — ANESTHESIA EVENT (OUTPATIENT)
Dept: PERIOP | Facility: HOSPITAL | Age: 62
End: 2022-11-02

## 2022-11-03 ENCOUNTER — HOSPITAL ENCOUNTER (OUTPATIENT)
Facility: HOSPITAL | Age: 62
Setting detail: OUTPATIENT SURGERY
Discharge: HOME | End: 2022-11-03
Attending: OTOLARYNGOLOGY | Admitting: OTOLARYNGOLOGY

## 2022-11-03 ENCOUNTER — ANESTHESIA (OUTPATIENT)
Dept: PERIOP | Facility: HOSPITAL | Age: 62
End: 2022-11-03

## 2022-11-03 VITALS
BODY MASS INDEX: 28.52 KG/M2 | HEIGHT: 72 IN | TEMPERATURE: 97.8 F | WEIGHT: 210.54 LBS | SYSTOLIC BLOOD PRESSURE: 162 MMHG | DIASTOLIC BLOOD PRESSURE: 90 MMHG | OXYGEN SATURATION: 94 % | HEART RATE: 90 BPM | RESPIRATION RATE: 16 BRPM

## 2022-11-03 DIAGNOSIS — J33.9 NASAL POLYP: Primary | ICD-10-CM

## 2022-11-03 DEVICE — PROPEL MINI SINUS IMPLANT
Type: IMPLANTABLE DEVICE | Site: NOSE | Status: FUNCTIONAL
Brand: PROPEL MINI

## 2022-11-03 DEVICE — PACKING NASAL STD NOVAPAK: Type: IMPLANTABLE DEVICE | Site: NOSE | Status: FUNCTIONAL

## 2022-11-03 RX ORDER — FENTANYL CITRATE/PF 50 MCG/ML
50 SYRINGE (ML) INJECTION
Status: COMPLETED | OUTPATIENT
Start: 2022-11-03 | End: 2022-11-03

## 2022-11-03 RX ORDER — ROCURONIUM BROMIDE 10 MG/ML
INJECTION, SOLUTION INTRAVENOUS AS NEEDED
Status: DISCONTINUED | OUTPATIENT
Start: 2022-11-03 | End: 2022-11-03

## 2022-11-03 RX ORDER — NEOSTIGMINE METHYLSULFATE 1 MG/ML
INJECTION INTRAVENOUS AS NEEDED
Status: DISCONTINUED | OUTPATIENT
Start: 2022-11-03 | End: 2022-11-03

## 2022-11-03 RX ORDER — EPINEPHRINE 1 MG/ML
INJECTION, SOLUTION, CONCENTRATE INTRAVENOUS AS NEEDED
Status: DISCONTINUED | OUTPATIENT
Start: 2022-11-03 | End: 2022-11-03 | Stop reason: HOSPADM

## 2022-11-03 RX ORDER — LIDOCAINE HYDROCHLORIDE 20 MG/ML
INJECTION, SOLUTION EPIDURAL; INFILTRATION; INTRACAUDAL; PERINEURAL AS NEEDED
Status: DISCONTINUED | OUTPATIENT
Start: 2022-11-03 | End: 2022-11-03

## 2022-11-03 RX ORDER — LIDOCAINE HYDROCHLORIDE AND EPINEPHRINE 10; 10 MG/ML; UG/ML
INJECTION, SOLUTION INFILTRATION; PERINEURAL AS NEEDED
Status: DISCONTINUED | OUTPATIENT
Start: 2022-11-03 | End: 2022-11-03 | Stop reason: HOSPADM

## 2022-11-03 RX ORDER — GLYCOPYRROLATE 0.2 MG/ML
INJECTION INTRAMUSCULAR; INTRAVENOUS AS NEEDED
Status: DISCONTINUED | OUTPATIENT
Start: 2022-11-03 | End: 2022-11-03

## 2022-11-03 RX ORDER — FENTANYL CITRATE 50 UG/ML
INJECTION, SOLUTION INTRAMUSCULAR; INTRAVENOUS AS NEEDED
Status: DISCONTINUED | OUTPATIENT
Start: 2022-11-03 | End: 2022-11-03

## 2022-11-03 RX ORDER — OXYMETAZOLINE HYDROCHLORIDE 0.05 G/100ML
SPRAY NASAL AS NEEDED
Status: DISCONTINUED | OUTPATIENT
Start: 2022-11-03 | End: 2022-11-03 | Stop reason: HOSPADM

## 2022-11-03 RX ORDER — CEPHALEXIN 500 MG/1
500 CAPSULE ORAL EVERY 8 HOURS SCHEDULED
Qty: 21 CAPSULE | Refills: 0 | Status: SHIPPED | OUTPATIENT
Start: 2022-11-03 | End: 2022-11-10

## 2022-11-03 RX ORDER — OXYCODONE HYDROCHLORIDE 5 MG/1
5 TABLET ORAL EVERY 4 HOURS PRN
Status: DISCONTINUED | OUTPATIENT
Start: 2022-11-03 | End: 2022-11-03 | Stop reason: HOSPADM

## 2022-11-03 RX ORDER — DEXAMETHASONE SODIUM PHOSPHATE 10 MG/ML
INJECTION, SOLUTION INTRAMUSCULAR; INTRAVENOUS AS NEEDED
Status: DISCONTINUED | OUTPATIENT
Start: 2022-11-03 | End: 2022-11-03

## 2022-11-03 RX ORDER — EPHEDRINE SULFATE 50 MG/ML
INJECTION INTRAVENOUS AS NEEDED
Status: DISCONTINUED | OUTPATIENT
Start: 2022-11-03 | End: 2022-11-03

## 2022-11-03 RX ORDER — MIDAZOLAM HYDROCHLORIDE 2 MG/2ML
INJECTION, SOLUTION INTRAMUSCULAR; INTRAVENOUS AS NEEDED
Status: DISCONTINUED | OUTPATIENT
Start: 2022-11-03 | End: 2022-11-03

## 2022-11-03 RX ORDER — GINSENG 100 MG
CAPSULE ORAL AS NEEDED
Status: DISCONTINUED | OUTPATIENT
Start: 2022-11-03 | End: 2022-11-03 | Stop reason: HOSPADM

## 2022-11-03 RX ORDER — PROPOFOL 10 MG/ML
INJECTION, EMULSION INTRAVENOUS AS NEEDED
Status: DISCONTINUED | OUTPATIENT
Start: 2022-11-03 | End: 2022-11-03

## 2022-11-03 RX ORDER — ONDANSETRON 2 MG/ML
4 INJECTION INTRAMUSCULAR; INTRAVENOUS ONCE AS NEEDED
Status: DISCONTINUED | OUTPATIENT
Start: 2022-11-03 | End: 2022-11-03 | Stop reason: HOSPADM

## 2022-11-03 RX ORDER — ONDANSETRON 2 MG/ML
INJECTION INTRAMUSCULAR; INTRAVENOUS AS NEEDED
Status: DISCONTINUED | OUTPATIENT
Start: 2022-11-03 | End: 2022-11-03

## 2022-11-03 RX ORDER — SODIUM CHLORIDE 9 MG/ML
125 INJECTION, SOLUTION INTRAVENOUS CONTINUOUS
Status: DISCONTINUED | OUTPATIENT
Start: 2022-11-03 | End: 2022-11-03 | Stop reason: HOSPADM

## 2022-11-03 RX ORDER — ACETAMINOPHEN 325 MG/1
650 TABLET ORAL EVERY 6 HOURS PRN
Status: DISCONTINUED | OUTPATIENT
Start: 2022-11-03 | End: 2022-11-03 | Stop reason: HOSPADM

## 2022-11-03 RX ORDER — FENTANYL CITRATE 50 UG/ML
50 INJECTION, SOLUTION INTRAMUSCULAR; INTRAVENOUS
Status: DISCONTINUED | OUTPATIENT
Start: 2022-11-03 | End: 2022-11-03 | Stop reason: HOSPADM

## 2022-11-03 RX ORDER — PROPOFOL 10 MG/ML
INJECTION, EMULSION INTRAVENOUS CONTINUOUS PRN
Status: DISCONTINUED | OUTPATIENT
Start: 2022-11-03 | End: 2022-11-03

## 2022-11-03 RX ORDER — OXYCODONE HYDROCHLORIDE 5 MG/1
5 TABLET ORAL EVERY 4 HOURS PRN
Qty: 10 TABLET | Refills: 0 | Status: SHIPPED | OUTPATIENT
Start: 2022-11-03

## 2022-11-03 RX ORDER — MAGNESIUM HYDROXIDE 1200 MG/15ML
LIQUID ORAL AS NEEDED
Status: DISCONTINUED | OUTPATIENT
Start: 2022-11-03 | End: 2022-11-03 | Stop reason: HOSPADM

## 2022-11-03 RX ADMIN — PROPOFOL 120 MCG/KG/MIN: 10 INJECTION, EMULSION INTRAVENOUS at 11:39

## 2022-11-03 RX ADMIN — ROCURONIUM BROMIDE 30 MG: 50 INJECTION, SOLUTION INTRAVENOUS at 11:39

## 2022-11-03 RX ADMIN — FENTANYL CITRATE 50 MCG: 50 INJECTION, SOLUTION INTRAMUSCULAR; INTRAVENOUS at 14:44

## 2022-11-03 RX ADMIN — ONDANSETRON 4 MG: 2 INJECTION INTRAMUSCULAR; INTRAVENOUS at 13:38

## 2022-11-03 RX ADMIN — FENTANYL CITRATE 50 MCG: 50 INJECTION INTRAMUSCULAR; INTRAVENOUS at 12:49

## 2022-11-03 RX ADMIN — FENTANYL CITRATE 50 MCG: 50 INJECTION INTRAMUSCULAR; INTRAVENOUS at 11:39

## 2022-11-03 RX ADMIN — PROPOFOL 50 MG: 10 INJECTION, EMULSION INTRAVENOUS at 12:44

## 2022-11-03 RX ADMIN — SODIUM CHLORIDE 0.12 MCG/KG/MIN: 9 INJECTION, SOLUTION INTRAVENOUS at 11:39

## 2022-11-03 RX ADMIN — EPHEDRINE SULFATE 10 MG: 50 INJECTION, SOLUTION INTRAVENOUS at 12:11

## 2022-11-03 RX ADMIN — FENTANYL CITRATE 50 MCG: 50 INJECTION, SOLUTION INTRAMUSCULAR; INTRAVENOUS at 14:16

## 2022-11-03 RX ADMIN — DEXAMETHASONE SODIUM PHOSPHATE 10 MG: 10 INJECTION, SOLUTION INTRAMUSCULAR; INTRAVENOUS at 11:43

## 2022-11-03 RX ADMIN — PROPOFOL 200 MG: 10 INJECTION, EMULSION INTRAVENOUS at 11:39

## 2022-11-03 RX ADMIN — FENTANYL CITRATE 50 MCG: 50 INJECTION, SOLUTION INTRAMUSCULAR; INTRAVENOUS at 14:26

## 2022-11-03 RX ADMIN — SODIUM CHLORIDE: 0.9 INJECTION, SOLUTION INTRAVENOUS at 12:12

## 2022-11-03 RX ADMIN — LIDOCAINE HYDROCHLORIDE 100 MG: 20 INJECTION, SOLUTION EPIDURAL; INFILTRATION; INTRACAUDAL; PERINEURAL at 11:39

## 2022-11-03 RX ADMIN — ACETAMINOPHEN 650 MG: 325 TABLET ORAL at 15:53

## 2022-11-03 RX ADMIN — MIDAZOLAM 2 MG: 1 INJECTION INTRAMUSCULAR; INTRAVENOUS at 11:34

## 2022-11-03 RX ADMIN — OXYCODONE 5 MG: 5 TABLET ORAL at 15:53

## 2022-11-03 RX ADMIN — NEOSTIGMINE METHYLSULFATE 3 MG: 1 INJECTION INTRAVENOUS at 13:42

## 2022-11-03 RX ADMIN — SODIUM CHLORIDE 125 ML/HR: 0.9 INJECTION, SOLUTION INTRAVENOUS at 10:03

## 2022-11-03 RX ADMIN — GLYCOPYRROLATE 0.4 MG: 0.2 INJECTION, SOLUTION INTRAMUSCULAR; INTRAVENOUS at 13:42

## 2022-11-03 NOTE — ANESTHESIA POSTPROCEDURE EVALUATION
Post-Op Assessment Note    CV Status:  Stable  Pain Score: 0    Pain management: adequate     Mental Status:  Alert and awake   Hydration Status:  Euvolemic   PONV Controlled:  Controlled   Airway Patency:  Patent      Post Op Vitals Reviewed: Yes      Staff: Anesthesiologist         No complications documented      BP      Temp      Pulse     Resp      SpO2      /90   Pulse 90   Temp 97 8 °F (36 6 °C) (Temporal)   Resp 16   Ht 6' (1 829 m)   Wt 95 5 kg (210 lb 8 6 oz)   SpO2 94%   BMI 28 55 kg/m²

## 2022-11-03 NOTE — H&P
Surgery Pre-op note/Updated History and Physical    Date of service: 11/3/681314:11 AM      No changes from most recent clinic H&P note  Patient to OR for FESS possible septoplasty turbs  Vitals:    10/13/21 0845   BP: 131/91   Pulse:    Resp:    Temp:    SpO2:      ENT: Normal   Chest: Breathing, unremarkable  Abd: Unremarkable  Ext: Unremarkable    The procedure was discussed with the patient, including risks, benefits, and alternatives and all questions were answered  Consent signed and in the chart      Jason Kevin MD MPH  Otolaryngology--Head and Neck Surgery  [facility]  11/3/2022 11:11 AM

## 2022-11-03 NOTE — OP NOTE
Otolaryngology - Head & Neck Surgery Operative Report    Patient Name: Jessica Lennon  YOB: 1960  MRN: [id number]  Date of Surgery: 11/3/2022    PRIMARY SURGEON:  Analia Black MD    ASSISTANT SURGEONS:  None    PREOPERATIVE DIAGNOSIS:  Nasal polyposis  CRS  Nasal obstruction    POSTOPERATIVE DIAGNOSIS:  Same    ANESTHESIA:  General endotracheal    INDICATION FOR PROCEDURE:  The patient has nasal obstruction and chronic rhinosinusitus that has failed to significantly improve with medical management including topical steroids  The decision was made to proceed to the OR for endoscopic sinus surgery to alleviate symptoms and facilitate improved irrigations and medication delivery  PROCEDURES:  1) Bilateral Total ethmoidectomy with sphenoidotomy with tissue removal (42041-12)  2) Bilateral frontal sinusotomy (10588-08)  3) Bilateral maxillary sinusotomy with tissue removal (82138-43)  4) Endoscopic septoplasty (88391)  5) Bilateral inferior turbinate outfracture  6) Extradural computer navigation (63844)    FINDINGS:  Bilateral extensive polyposis, with soft chronically inflamed bone  Polyps removed from all sinuses  Propel minis and novopack placed  Septoplasty preformed, mott splints placed  Bilateral turbinates out fractured   Skull base and lamina intact throughout and at the conclusion of the case    ESTIMATED BLOOD LOSS:  75 mL    SPECIMENS:  None    COMPLICATIONS:  None    DESCRIPTION OF PROCEDURE:  Discussion was held with the patient regarding the risks, benefits and alternatives of the procedure  The patient expressed understanding and agreement with this and informed consent was obtained  The patient was brought to the operating room and identified by name and clinic number  He was placed on the operating table in the supine position and safety straps were applied   General endotracheal anesthesia was performed by the Anesthesia team  The patient was prepped and draped in the standard fashion for nasal surgery  Pre-procedure time-out was performed  The 0 degree 4 mm rigid endoscope was used to visualize the bilateral nasal cavities back to the nasopharynx  Injection with 1% lidocaine with 1:100,000 epinephrine was performed to the lateral nasal sidewalls, head of the middle turbinate and area of the sphenopalatine foramen bilaterally  Topical decongestion with 1:1000 epinephrine pledgets was performed prior to procedure start  Extradural computer navigation was used for the following indication(s): disease along the orbit and skull base  Preoperative CT imaging was reviewed prior to the procedure  The image guidance headpiece was placed and registration was performed, with confirmation of accuracy within 1-2 mm  Navigation was used throughout the procedure to identify and confirm the location of the lamina papyracea, skull base and sinuses  FESS  We began on the right side using the 0 degree 4 mm rigid endoscope  The uncinate process was out-fractured and taken down  The natural maxillary sinus ostium was identified and widened posteriorly, inferiorly and anteriorly using through-cutting instruments  The 70 degree scope was then used to visualize within the maxillary sinus  There was significant polyposis, which was removed with curved instruments  We switched back to the 0 degree scope to address the ethmoid sinuses  The ethmoid bulla was identified and taken down  The anterior ethmoid sinuses were taken down off the lamina papyracea and the basal lamella was opened approximately at the level of the maxillary sinus roof to enter the posterior ethmoid sinuses  Care was taken to preserve sufficient basal lamella to prevent destabilization of the middle turbinate  The superior turbinate was encountered and the inferior 1/3 of this was removed to facilitate identification of the sphenoid sinus   The posterior ethmoids were taken down with a combination of through-cutting instruments and microdebrider  The posterior-most ethmoid was entered, and remaining ethmoid cells taken down until the junction of the lamina papyracea and skull base was easily visualized  The sphenoid ostium was identified medial to the superior turbinate, and was widened superiorly and laterally  Care was taken to protect the mucosa inferior to the ostium to preserve the posterior septal artery  There was dense polypoid tissue in the spheroids this was removed tissue removal     We switched to the 70 degree scope and took down the remaining ethmoid cells along the skull base in a posterior to anterior direction  The anterior ethmoid artery was preserved  The agger nasi was opened and with the assistance of navigation the natural frontal outflow tract was identified  The surrounding anterior ethmoid cells were taken down and the frontal outflow tract widened  At this point we felt satisified with the extent of the procedure on the right and a similar procedure was performed on the left  Septoplasty  Attention first turned to the septum  Local anesthesia with 1% lidocaine was infiltrated along the nasal septum  A 15-blade scalpel was used to create a left hemitransfixion incision  The Joy and The Moncho-Jose Luis were used to dissect the left mucoperichondrial flap away from the cartilage  A D-knife was used to incise 1cm behind the caudal septum, and the Angi Garter was used to elevate the contralateral mucoperichondrial flap  A swivel knife was used to remove the cartilagenous quadrangular septum  A cross-action true-cut instrument was used to remove pieces of the bony septum  Parts of the vomer that were obstructing the nasal airway were removed using the joy  The inferior turbinates which had been previously reduced were then out fractured on both sides with a boise elevator       Novopack dissolvable nasal packing was placed in the bilateral middle meati to decrease the risk of middle turbinate lateralization and scarring  Propel mini stent was placed in the frontals to facilitate postoperative healing and prevent stenosis  Myers splints coated in bacitracin ointment were placed in the bilateral nasal cavities and secured to the anterior septum with 2-0 prolene tied on the left side  At this point we felt satisfied with the extent of the procedure  The stomach was suctioned with a [place] sump and the patient was turned over to the anesthesia team to be awakened, extubated and transferred to the PACU in stable condition  Standard operating room protocol and universal precautions were utilized throughout the procedure      I was present for the procedure    Christine Manning MD MPH  Otolaryngology--Head and Neck Surgery  [facility]  11/3/2022 1:53 PM

## 2022-11-03 NOTE — DISCHARGE INSTRUCTIONS
[facility]  Post-Operative Instructions  Office [phone]      Sinus surgery discharge instructions    1  Time away from work: The average time is 1 week   2  What to expect: Headache, nasal pain, eye pain, pain as well as tooth pain, facial/nose numbness, bleeding, occasional increased tearing or blood tinged drainage from tear duct, loss of smell that comes back usually in 6 weeks, but may not return  3  Packing: You have packing in your nose that will need to be removed in 7 days  4  Activity: No strenuous activity as well as no heavy lifting for 2 weeks after surgery  Avoid nose blowing or sneezing except with mouth open  5  Diet: Once you are not nauseated, you can have your regular diet   6  Medications:    Pain: Over the counter Tylenol and Motrin is best for pain  You will be given prescriptions for painkillers for severe pain  You need to avoid driving or working while on these medications  Nausea: May worsen with prescription pain meds, please call if this is the case   Stool softeners: While you are taking narcotics, you may get constipated  Over the counter stool softeners are useful while on prescription pain killers    Antibiotics: You will be given antibiotics for 7 days  7  Nasal care following surgery:    Day 1-7  Expect intermittent bleeding, use Afrin for persistent bleeding   Day 2 Start using NeilMed irrigations or nasal saline spray 1-2 times per day  NeilMed irrigation can be purchased at any pharmacy   An example of the irrigation bottle is:  tab ticketbroker/store/c/neilmed-sinus-rinse-kit/HV=[id number]  A video on its use is:  [employer] saurabh      8  Sleep:  Sleep with head elevated to decrease congestion/bleeding  If you have sleep apnea, and you use a CPAP machine, NO CPAP for 2 weeks post surgery  9  Follow up: If you had a septoplasty: Follow up in 6-7 days for removal of the packing   If no septoplasty, follow up day 10-14 days   10  When to call:    Bleeding that is persistent and not intermittent    Sudden change in vision /  Purulence from your nose   Severe headache, nausea or vomiting,    Fever >101 F              CALL 911 IF SEVERE BLEEDING OR BREATHING PROBLEM    Office number: [phone]             How to contact us:    Phone: If you have questions or concerns, please call us at [phone] during business hours (8 am to 5 pm)  On nights and weekends, you may page the ENT surgeon on call  at [facility]   In case of emergency, please call [phone]

## 2022-11-03 NOTE — ANESTHESIA PREPROCEDURE EVALUATION
Procedure:  FESS (MAXILLARY WITH TISSUE REMOVAL, TOTAL ETHMOIDECTOMY, AND FRONTAL SINUSOTOMY) WITH IMAGE GUIDANCE  (Bilateral Nose)  SEPTOPLASTY (Bilateral Nose)  TURBINECTOMY (Bilateral Nose)    Relevant Problems   CARDIO   (+) Atrial flutter (HCC)   (+) Essential hypertension   (+) Hypercholesterolemia   (+) Migraine headache      /RENAL   (+) Kidney stone      NEURO/PSYCH   (+) Migraine headache   (+) S/P ablation of atrial fibrillation        Physical Exam    Airway    Mallampati score: II  TM Distance: >3 FB  Neck ROM: full     Dental       Cardiovascular  Rhythm: regular, Rate: normal, Cardiovascular exam normal    Pulmonary  Pulmonary exam normal Breath sounds clear to auscultation,     Other Findings        Anesthesia Plan  ASA Score- 2     Anesthesia Type- general with ASA Monitors  Additional Monitors:   Airway Plan: ETT  Plan Factors-    Chart reviewed  EKG reviewed  Existing labs reviewed  Patient summary reviewed  Patient is not a current smoker  Patient not instructed to abstain from smoking on day of procedure  Patient did not smoke on day of surgery  There is medical exclusion for perioperative obstructive sleep apnea risk education  Induction- intravenous  Postoperative Plan- Plan for postoperative opioid use  Planned trial extubation    Informed Consent- Anesthetic plan and risks discussed with patient

## 2022-12-10 ENCOUNTER — AMB VIDEO VISIT (OUTPATIENT)
Dept: OTHER | Facility: HOSPITAL | Age: 62
End: 2022-12-10

## 2022-12-10 NOTE — PATIENT INSTRUCTIONS
Continue with supportive care, Tylenol and Motrin as needed for pain and fever, plenty of rest Plenty of hydration, can take over the counter multisymptom cold or flu medication as needed for symptoms  Follow up with PCP in 2-3 days if symptoms not improving   Go to ER if worsening symptoms, development of shortness of breath, chest pain, wheezing, dizziness, fainting, nausea vomiting, inability to eat or drink

## 2022-12-10 NOTE — CARE ANYWHERE EVISITS
Visit Summary for [facility] - Gender: Male - Date of Birth: [id number]  Date: [id number] - Duration: 5 minutes  Patient: [address] [employer] [facility]  Provider: Gabriela Ribeiro PA-C    Patient Contact Information  Address  [address]; [address]  [phone]    Visit Topics    Triage Questions   What is your current physical address in the event of a medical emergency? Answer []  Are you allergic to any medications? Answer []  Are you now or could you be pregnant? Answer []  Do you have any immune system compromise or chronic lung   disease? Answer []  Do you have any vulnerable family members in the home (infant, pregnant, cancer, elderly)? Answer []     Conversation Transcripts  [0A][0A] [Notification] You are connected with Gabriela Ribeiro PA-C, Urgent Care Specialist [0A][Notification] VIVIANE Carrillo is located in [place]  [0A][Notification] [address] [employer] has shared health history  [place]  [0A]    Diagnosis  COVID-19    Procedures  Value: 14439 Code: CPT-4 UNLISTED E&M SERVICE    Medications Prescribed    No prescriptions ordered    Provider Notes  [0A][0A] Continue with supportive care, get plenty of rest, stay well hydrated, Tylenol and Motrin as needed for pain and fever  Can take over the counter multisymptom cold/flu medications as needed for symptoms  Follow up with PCP in 2-3 days; Go to ER   if worsening symptoms, development of shortness of breath, wheezing, chest pain, nausea vomiting, inability to eat or drink, fever not relieved with tylenol or motrin  [0A]    Electronically signed by: Dulce Bhatia(NPI [id number])

## 2022-12-10 NOTE — PROGRESS NOTES
Video Visit - Tretha Ormond 60 y o  male MRN: [id number]    REQUIRED DOCUMENTATION:         1  This service was provided via [employer]  2  Provider located at [address] [place] [id number] [phone]  3  [facility] provider: Lara Wyatt PA-C   4  Identify all parties in room with patient during [facility] visit:  self  5  After connecting through [employer], patient was identified by name and date of birth  Patient was then informed that this was a Telemedicine visit and that the exam was being conducted confidentially over secure lines  My office door was closed  No one else was in the room  Patient acknowledged consent and understanding of privacy and security of the Telemedicine visit  I informed the patient that I have reviewed their record in Epic and presented the opportunity for them to ask any questions regarding the visit today  The patient agreed to participate  HPI  Review of Systems   Constitutional: Positive for fatigue  HENT: Positive for congestion, postnasal drip, sinus pressure and sore throat  Respiratory: Positive for cough  Musculoskeletal: Positive for arthralgias and myalgias  All other systems reviewed and are negative  There were no vitals filed for this visit  Physical Exam  Constitutional:       General: He is not in acute distress  Appearance: Normal appearance  He is normal weight  He is not ill-appearing, toxic-appearing or diaphoretic  HENT:      Head: Normocephalic and atraumatic  Right Ear: External ear normal       Left Ear: External ear normal       Nose: Congestion and rhinorrhea present  Mouth/Throat:      Pharynx: Posterior oropharyngeal erythema present  Eyes:      Conjunctiva/sclera: Conjunctivae normal    Pulmonary:      Effort: Pulmonary effort is normal  No respiratory distress  Breath sounds: No stridor  No wheezing     Neurological:      Mental Status: He is alert and oriented to person, place, and time  Mental status is at baseline  Psychiatric:         Mood and Affect: Mood normal          Behavior: Behavior normal          Thought Content: Thought content normal          Judgment: Judgment normal        There are no diagnoses linked to this encounter  Patient Instructions   Continue with supportive care, Tylenol and Motrin as needed for pain and fever, plenty of rest Plenty of hydration, can take over the counter multisymptom cold or flu medication as needed for symptoms  Follow up with PCP in 2-3 days if symptoms not improving  Go to ER if worsening symptoms, development of shortness of breath, chest pain, wheezing, dizziness, fainting, nausea vomiting, inability to eat or drink      Follow up with PCP if not improved, if symptoms are worse, go to the ER

## 2023-01-06 DIAGNOSIS — I10 ESSENTIAL HYPERTENSION: ICD-10-CM

## 2023-01-06 DIAGNOSIS — E78.00 HYPERCHOLESTEROLEMIA: ICD-10-CM

## 2023-01-06 DIAGNOSIS — I48.92 ATRIAL FLUTTER, UNSPECIFIED TYPE (HCC): ICD-10-CM

## 2023-01-06 RX ORDER — DILTIAZEM HYDROCHLORIDE 180 MG/1
180 CAPSULE, COATED, EXTENDED RELEASE ORAL DAILY
Qty: 90 CAPSULE | Refills: 3 | Status: SHIPPED | OUTPATIENT
Start: 2023-01-06

## 2023-01-06 RX ORDER — LOSARTAN POTASSIUM 25 MG/1
25 TABLET ORAL DAILY
Qty: 90 TABLET | Refills: 3 | Status: SHIPPED | OUTPATIENT
Start: 2023-01-06

## 2023-01-06 RX ORDER — ATORVASTATIN CALCIUM 10 MG/1
10 TABLET, FILM COATED ORAL DAILY
Qty: 90 TABLET | Refills: 3 | Status: SHIPPED | OUTPATIENT
Start: 2023-01-06

## 2023-01-06 NOTE — TELEPHONE ENCOUNTER
Requested medication(s) are due for refill today:YES  Patient has already received a courtesy refill: NO  Other reason request has been forwarded to provider: Zully Adams

## 2023-03-11 DIAGNOSIS — I48.92 ATRIAL FLUTTER, UNSPECIFIED TYPE (HCC): ICD-10-CM

## 2023-03-13 RX ORDER — DILTIAZEM HYDROCHLORIDE 180 MG/1
180 CAPSULE, COATED, EXTENDED RELEASE ORAL DAILY
Qty: 90 CAPSULE | Refills: 0 | Status: SHIPPED | OUTPATIENT
Start: 2023-03-13

## 2023-03-22 ENCOUNTER — HOSPITAL ENCOUNTER (EMERGENCY)
Facility: HOSPITAL | Age: 63
Discharge: HOME | End: 2023-03-22
Attending: EMERGENCY MEDICINE

## 2023-03-22 ENCOUNTER — APPOINTMENT (EMERGENCY)
Dept: RADIOLOGY | Facility: HOSPITAL | Age: 63
End: 2023-03-22

## 2023-03-22 VITALS
WEIGHT: 210 LBS | SYSTOLIC BLOOD PRESSURE: 143 MMHG | OXYGEN SATURATION: 95 % | HEART RATE: 78 BPM | BODY MASS INDEX: 28.44 KG/M2 | RESPIRATION RATE: 22 BRPM | DIASTOLIC BLOOD PRESSURE: 89 MMHG | HEIGHT: 72 IN | TEMPERATURE: 97.7 F

## 2023-03-22 DIAGNOSIS — R05.9 COUGH: ICD-10-CM

## 2023-03-22 DIAGNOSIS — R07.9 CHEST PAIN, UNSPECIFIED TYPE: Primary | ICD-10-CM

## 2023-03-22 DIAGNOSIS — R55 NEAR SYNCOPE: ICD-10-CM

## 2023-03-22 LAB
2HR DELTA HS TROPONIN: -1 NG/L
ALBUMIN SERPL BCP-MCNC: 3.8 G/DL (ref 3.5–5)
ALP SERPL-CCNC: 96 U/L (ref 46–116)
ALT SERPL W P-5'-P-CCNC: 56 U/L (ref 12–78)
ANION GAP SERPL CALCULATED.3IONS-SCNC: 3 MMOL/L (ref 4–13)
AST SERPL W P-5'-P-CCNC: 48 U/L (ref 5–45)
ATRIAL RATE: 78 BPM
ATRIAL RATE: 79 BPM
BASOPHILS # BLD AUTO: 0.07 THOUSANDS/ÂΜL (ref 0–0.1)
BASOPHILS NFR BLD AUTO: 1 % (ref 0–1)
BILIRUB SERPL-MCNC: 0.58 MG/DL (ref 0.2–1)
BUN SERPL-MCNC: 15 MG/DL (ref 5–25)
CALCIUM SERPL-MCNC: 8.8 MG/DL (ref 8.3–10.1)
CARDIAC TROPONIN I PNL SERPL HS: 3 NG/L
CARDIAC TROPONIN I PNL SERPL HS: 4 NG/L
CHLORIDE SERPL-SCNC: 109 MMOL/L (ref 96–108)
CO2 SERPL-SCNC: 24 MMOL/L (ref 21–32)
CREAT SERPL-MCNC: 1.08 MG/DL (ref 0.6–1.3)
EOSINOPHIL # BLD AUTO: 0.12 THOUSAND/ÂΜL (ref 0–0.61)
EOSINOPHIL NFR BLD AUTO: 1 % (ref 0–6)
ERYTHROCYTE [DISTWIDTH] IN BLOOD BY AUTOMATED COUNT: 13.1 % (ref 11.6–15.1)
GFR SERPL CREATININE-BSD FRML MDRD: 72 ML/MIN/1.73SQ M
GLUCOSE SERPL-MCNC: 130 MG/DL (ref 65–140)
HCT VFR BLD AUTO: 43.5 % (ref 36.5–49.3)
HGB BLD-MCNC: 15.4 G/DL (ref 12–17)
IMM GRANULOCYTES # BLD AUTO: 0.02 THOUSAND/UL (ref 0–0.2)
IMM GRANULOCYTES NFR BLD AUTO: 0 % (ref 0–2)
LYMPHOCYTES # BLD AUTO: 3.6 THOUSANDS/ÂΜL (ref 0.6–4.47)
LYMPHOCYTES NFR BLD AUTO: 36 % (ref 14–44)
MCH RBC QN AUTO: 32.9 PG (ref 26.8–34.3)
MCHC RBC AUTO-ENTMCNC: 35.4 G/DL (ref 31.4–37.4)
MCV RBC AUTO: 93 FL (ref 82–98)
MONOCYTES # BLD AUTO: 0.67 THOUSAND/ÂΜL (ref 0.17–1.22)
MONOCYTES NFR BLD AUTO: 7 % (ref 4–12)
NEUTROPHILS # BLD AUTO: 5.62 THOUSANDS/ÂΜL (ref 1.85–7.62)
NEUTS SEG NFR BLD AUTO: 55 % (ref 43–75)
NRBC BLD AUTO-RTO: 0 /100 WBCS
P AXIS: 68 DEGREES
P AXIS: 76 DEGREES
PLATELET # BLD AUTO: 269 THOUSANDS/UL (ref 149–390)
PMV BLD AUTO: 9.2 FL (ref 8.9–12.7)
POTASSIUM SERPL-SCNC: 4.4 MMOL/L (ref 3.5–5.3)
PR INTERVAL: 144 MS
PR INTERVAL: 146 MS
PROT SERPL-MCNC: 7.9 G/DL (ref 6.4–8.4)
QRS AXIS: 26 DEGREES
QRS AXIS: 41 DEGREES
QRSD INTERVAL: 90 MS
QRSD INTERVAL: 94 MS
QT INTERVAL: 350 MS
QT INTERVAL: 362 MS
QTC INTERVAL: 401 MS
QTC INTERVAL: 417 MS
RBC # BLD AUTO: 4.68 MILLION/UL (ref 3.88–5.62)
SODIUM SERPL-SCNC: 136 MMOL/L (ref 135–147)
T WAVE AXIS: 49 DEGREES
T WAVE AXIS: 58 DEGREES
VENTRICULAR RATE: 79 BPM
VENTRICULAR RATE: 80 BPM
WBC # BLD AUTO: 10.1 THOUSAND/UL (ref 4.31–10.16)

## 2023-03-22 RX ORDER — BENZONATATE 100 MG/1
100 CAPSULE ORAL EVERY 8 HOURS
Qty: 21 CAPSULE | Refills: 0 | Status: SHIPPED | OUTPATIENT
Start: 2023-03-22

## 2023-03-22 NOTE — DISCHARGE INSTRUCTIONS
You were evaluated in the Emergency Department today for chest pain  Your evaluation has shown no signs of medical conditions requiring emergent intervention at this time, however we recommend that you follow up with your primary care physician as soon as possible for further testing as an outpatient  Please schedule an appointment for follow up with your primary care physician as soon as possible  Return to the Emergency Department if you experience worsening or uncontrolled chest pain, shortness of breath, light headedness, feeling faint, nausea, vomiting, or any other concerning symptoms  Thank you for choosing us for your care

## 2023-03-22 NOTE — ED ATTENDING ATTESTATION
3/22/2023  Danny ROSSI, DO, saw and evaluated the patient  I have discussed the patient with the resident/non-physician practitioner and agree with the resident's/non-physician practitioner's findings, Plan of Care, and MDM as documented in the resident's/non-physician practitioner's note, except where noted  All available labs and Radiology studies were reviewed  I was present for key portions of any procedure(s) performed by the resident/non-physician practitioner and I was immediately available to provide assistance  At this point I agree with the current assessment done in the Emergency Department  I have conducted an independent evaluation of this patient a history and physical is as follows:  Medical Decision Making  Background: 60 y o  male with chest pain, patient also had a light headedness episode while driving his car he had a significant coughing fit which caused lightheadedness no active syncope  He was a medical emergency here and brought down to the emergency department  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   Patient noted with significant stress with his partner having cancer at this point time  He still has some left-sided chest discomfort at this point time some mild shortness of breath that is associated with an intermittent cough and URI symptoms have been present for approximately 1 month duration  Differential DX includes but is not limited to: acs/mi, pe, pleurisy, dissection, pneumonia, musculoskeletal chest pain    Plan: cardiac workup      Amount and/or Complexity of Data Reviewed  Labs: ordered  Radiology: ordered  All labs reviewed and interpreted by myself   All radiology studies independently viewed by me and interpreted by myself     XR chest 2 views   Final Result      No acute cardiopulmonary disease                 Workstation performed: [id number]             Labs Reviewed   COMPREHENSIVE METABOLIC PANEL - Abnormal       Result Value Ref Range Status Sodium 136  135 - 147 mmol/L Final    Potassium 4 4  3 5 - 5 3 mmol/L Final    Comment: Slightly Hemolyzed; Results May be Affected    Chloride 109 (*) 96 - 108 mmol/L Final    CO2 24  21 - 32 mmol/L Final    ANION GAP 3 (*) 4 - 13 mmol/L Final    BUN 15  5 - 25 mg/dL Final    Creatinine 1 08  0 60 - 1 30 mg/dL Final    Comment: Standardized to IDMS reference method    Glucose 130  65 - 140 mg/dL Final    Comment: If the patient is fasting, the ADA then defines impaired fasting glucose as > 100 mg/dL and diabetes as > or equal to 123 mg/dL  Specimen collection should occur prior to Sulfasalazine administration due to the potential for falsely depressed results  Specimen collection should occur prior to Sulfapyridine administration due to the potential for falsely elevated results  Calcium 8 8  8 3 - 10 1 mg/dL Final    AST 48 (*) 5 - 45 U/L Final    Comment: Slightly Hemolyzed; Results May be Affected  Specimen collection should occur prior to Sulfasalazine administration due to the potential for falsely depressed results  ALT 56  12 - 78 U/L Final    Comment: Specimen collection should occur prior to Sulfasalazine and/or Sulfapyridine administration due to the potential for falsely depressed results  Alkaline Phosphatase 96  46 - 116 U/L Final    Total Protein 7 9  6 4 - 8 4 g/dL Final    Albumin 3 8  3 5 - 5 0 g/dL Final    Total Bilirubin 0 58  0 20 - 1 00 mg/dL Final    Comment: Use of this assay is not recommended for patients undergoing treatment with eltrombopag due to the potential for falsely elevated results      eGFR 72  ml/min/1 73sq m Final    Narrative:     [place] guidelines for Chronic Kidney Disease (CKD):   •  Stage 1 with normal or high GFR (GFR > 90 mL/min/1 73 square meters)  •  Stage 2 Mild CKD (GFR = 60-89 mL/min/1 73 square meters)  •  Stage 3A Moderate CKD (GFR = 45-59 mL/min/1 73 square meters)  •  Stage 3B Moderate CKD (GFR = 30-44 mL/min/1 73 square meters)  •  Stage 4 Severe CKD (GFR = 15-29 mL/min/1 73 square meters)  •  Stage 5 End Stage CKD (GFR <15 mL/min/1 73 square meters)  Note: GFR calculation is accurate only with a steady state creatinine   HS TROPONIN I 0HR - Normal    hs TnI 0hr 4  "Refer to ACS Flowchart"- see link ng/L Final    Comment:                                              Initial (time 0) result  If >=50 ng/L, Myocardial injury suggested ;  Type of myocardial injury and treatment strategy  to be determined  If 5-49 ng/L, a delta result at 2 hours and or 4 hours will be needed to further evaluate  If <4 ng/L, and chest pain has been >3 hours since onset, patient may qualify for discharge based on the HEART score in the ED  If <5 ng/L and <3hours since onset of chest pain, a delta result at 2 hours will be needed to further evaluate  HS Troponin 99th Percentile URL of a Health Population=12 ng/L with a 95% Confidence Interval of 8-18 ng/L  Second Troponin (time 2 hours)  If calculated delta >= 20 ng/L,  Myocardial injury suggested ; Type of myocardial injury and treatment strategy to be determined  If 5-49 ng/L and the calculated delta is 5-19 ng/L, consult medical service for evaluation  Continue evaluation for ischemia on ecg and other possible etiology and repeat hs troponin at 4 hours  If delta is <5 ng/L at 2 hours, consider discharge based on risk stratification via the HEART score (if in ED), or FLORA risk score in IP/Observation      HS Troponin 99th Percentile URL of a Health Population=12 ng/L with a 95% Confidence Interval of 8-18 ng/L    CBC AND DIFFERENTIAL    WBC 10 10  4 31 - 10 16 Thousand/uL Final    RBC 4 68  3 88 - 5 62 Million/uL Final    Hemoglobin 15 4  12 0 - 17 0 g/dL Final    Hematocrit 43 5  36 5 - 49 3 % Final    MCV 93  82 - 98 fL Final    MCH 32 9  26 8 - 34 3 pg Final    MCHC 35 4  31 4 - 37 4 g/dL Final    RDW 13 1  11 6 - 15 1 % Final    MPV 9 2  8 9 - 12 7 fL Final    Platelets 842  398 - 390 Thousands/uL Final    nRBC 0  /100 WBCs Final    Neutrophils Relative 55  43 - 75 % Final    Immat GRANS % 0  0 - 2 % Final    Lymphocytes Relative 36  14 - 44 % Final    Monocytes Relative 7  4 - 12 % Final    Eosinophils Relative 1  0 - 6 % Final    Basophils Relative 1  0 - 1 % Final    Neutrophils Absolute 5 62  1 85 - 7 62 Thousands/µL Final    Immature Grans Absolute 0 02  0 00 - 0 20 Thousand/uL Final    Lymphocytes Absolute 3 60  0 60 - 4 47 Thousands/µL Final    Monocytes Absolute 0 67  0 17 - 1 22 Thousand/µL Final    Eosinophils Absolute 0 12  0 00 - 0 61 Thousand/µL Final    Basophils Absolute 0 07  0 00 - 0 10 Thousands/µL Final   HS TROPONIN I 2HR   HS TROPONIN I 4HR       Clinical Impression:    Final diagnoses:   Chest pain, unspecified type   Near syncope         ED Course         Critical Care Time  Procedures

## 2023-03-22 NOTE — Clinical Note
Kiersten Sam was seen and treated in our emergency department on 3/22/2023  No restrictions            Diagnosis:     Felipe Bird    He may return on this date: 03/27/2023    Phi Onel was seen in our ED today  Please excuse him for any work missed and allow him to return on 3/27/2023  If you have any questions or concerns, please don't hesitate to call        Jose Rubin, DO    ______________________________           _______________          _______________  Hospital Representative                              Date                                Time

## 2023-03-22 NOTE — ED PROVIDER NOTES
History  Chief Complaint   Patient presents with   • Chest Pain     Pt c/o mid sternal CP/SOB with no radiation started this am   Pt states he has been fighting a cold for the past week and a half  Pt states he felt lightheaded and felt like he was going to pass out when coughing     Patient is a 13-year-old male with a significant past medical history of atrial fibrillation/atrial flutter, status post ablation approximately 1 year ago, currently anticoagulated on Eliquis, presenting for evaluation of chest pain  He states that over the last few weeks he has been having some coughing mainly productive of some phlegm  He states that he has been feeling overall rundown and stressed out at home, and that 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) and experienced a coughing fit followed by some left-sided chest pain  He felt some lightheadedness without syncope at the time  A medical emergency was called and the patient was subsequently brought to the emergency department for evaluation  He states that his symptoms have since subsided  He still has some mild pressure in his chest, but is no longer feeling as lightheaded  He has mild shortness of breath without any pleuritic pain  He denies any lower extremity swelling, tenderness, warmth  He is denying any fevers or chills  He is denying any nausea, vomiting, diarrhea, or abdominal pain  Prior to Admission Medications   Prescriptions Last Dose Informant Patient Reported? Taking?    apixaban (Eliquis) 5 mg   No No   Sig: Take 1 tablet (5 mg total) by mouth 2 (two) times a day   atorvastatin (LIPITOR) 10 mg tablet   No No   Sig: Take 1 tablet (10 mg total) by mouth daily   diltiazem (CARDIZEM CD) 180 mg 24 hr capsule   No No   Sig: Take 1 capsule (180 mg total) by mouth daily   fluticasone (FLONASE) 50 mcg/act nasal spray   Yes No   Si sprays into each nostril daily   losartan (COZAAR) 25 mg tablet   No No   Sig: Take 1 tablet (25 mg total) by mouth daily oxyCODONE (Roxicodone) 5 immediate release tablet   No No   Sig: Take 1 tablet (5 mg total) by mouth every 4 (four) hours as needed for moderate pain or severe pain for up to 10 doses Max Daily Amount: 30 mg   Patient not taking: Reported on 11/10/2022      Facility-Administered Medications: None       Past Medical History:   Diagnosis Date   • A-fib [facility])    • Hyperlipidemia    • Hypertension        Past Surgical History:   Procedure Laterality Date   • CARDIAC ELECTROPHYSIOLOGY PROCEDURE N/A 3/23/2022    Procedure: Cardiac eps/afib ablation;  Surgeon: Irene Watkins DO;  [facility]; Service: Cardiology   • CARDIAC ELECTROPHYSIOLOGY PROCEDURE N/A 3/23/2022    Procedure: Cardiac eps/aflutter ablation;  Surgeon: Irene Watkins DO;  [facility]; Service: Cardiology   • CHOLECYSTECTOMY     • COLONOSCOPY      x2 - last was  (neg)   • NASAL/SINUS ENDOSCOPY Bilateral 11/3/2022    Procedure: FESS (MAXILLARY WITH  TOTAL ETHMOIDECTOMY, AND FRONTAL SINUSOTOMY), Sphenoid WITH tissue removal and  IMAGE GUIDANCE ;  Surgeon: Liliya Padilla MD;  Location: [facility];  Service: ENT   • NOSE SURGERY      See ENT note 2016   • NY SEPTOPLASTY/SUBMUCOUS RESECJ W/WO CARTILAGE GRF Bilateral 11/3/2022    Procedure: SEPTOPLASTY;  Surgeon: Liliya Padilla MD;  Location: [facility];  Service: ENT       Family History   Problem Relation Age of Onset   • Other Mother         Sarcoma   • Hypertension Father      I have reviewed and agree with the history as documented      E-Cigarette/Vaping   • E-Cigarette Use Never User      E-Cigarette/Vaping Substances   • Nicotine No    • THC No    • CBD No    • Flavoring No    • Other No      Social History     Tobacco Use   • Smoking status: Former     Packs/day: 1 00     Years: 15 00     Pack years: 15 00     Types: Cigarettes     Quit date: 2014     Years since quittin 3   • Smokeless tobacco: Never   Vaping Use   • Vaping Use: Never used   Substance Use Topics   • Alcohol use: Yes     Alcohol/week: 7 0 standard drinks     Types: 7 Glasses of wine per week     Comment: 1 glass of wine daily   • Drug use: No        Review of Systems   Constitutional: Negative for chills and fever  Respiratory: Positive for cough and shortness of breath  Cardiovascular: Positive for chest pain  Negative for leg swelling  Gastrointestinal: Negative for abdominal pain, constipation, diarrhea, nausea and vomiting  Musculoskeletal: Negative for back pain  Neurological: Positive for light-headedness  Negative for syncope and headaches  All other systems reviewed and are negative  Physical Exam  ED Triage Vitals   Temperature Pulse Respirations Blood Pressure SpO2   03/22/23 0903 03/22/23 1008 03/22/23 1008 03/22/23 0903 03/22/23 0903   97 7 °F (36 5 °C) 80 18 (!) 178/99 97 %      Temp Source Heart Rate Source Patient Position - Orthostatic VS BP Location FiO2 (%)   03/22/23 0903 03/22/23 0903 03/22/23 0903 03/22/23 0903 --   Tympanic Monitor Lying Right arm       Pain Score       03/22/23 0903       5             Orthostatic Vital Signs  Vitals:    03/22/23 0903 03/22/23 1008 03/22/23 1124   BP: (!) 178/99  143/89   Pulse:  80 78   Patient Position - Orthostatic VS: Lying  Lying       Physical Exam  Vitals and nursing note reviewed  Constitutional:       General: He is not in acute distress  Appearance: Normal appearance  He is not ill-appearing  HENT:      Head: Normocephalic and atraumatic  Right Ear: External ear normal       Left Ear: External ear normal       Nose: Nose normal       Mouth/Throat:      Mouth: Mucous membranes are moist    Eyes:      General: No visual field deficit or scleral icterus  Right eye: No discharge  Left eye: No discharge  Extraocular Movements: Extraocular movements intact  Conjunctiva/sclera: Conjunctivae normal       Pupils: Pupils are equal, round, and reactive to light  Cardiovascular:      Rate and Rhythm: Normal rate and regular rhythm  Pulses: Normal pulses  Heart sounds: Normal heart sounds  No murmur heard  No friction rub  No gallop  Pulmonary:      Effort: Pulmonary effort is normal  No respiratory distress  Breath sounds: Normal breath sounds  No wheezing, rhonchi or rales  Abdominal:      General: Abdomen is flat  There is no distension  Palpations: Abdomen is soft  There is no mass  Tenderness: There is no abdominal tenderness  Genitourinary:     Comments: Deferred  Musculoskeletal:         General: Normal range of motion  Cervical back: Normal range of motion  No rigidity or tenderness  Right lower leg: No edema  Left lower leg: No edema  Skin:     General: Skin is warm and dry  Neurological:      General: No focal deficit present  Mental Status: He is alert and oriented to person, place, and time  GCS: GCS eye subscore is 4  GCS verbal subscore is 5  GCS motor subscore is 6  Cranial Nerves: No cranial nerve deficit, dysarthria or facial asymmetry  Sensory: Sensation is intact  No sensory deficit  Motor: Motor function is intact  No pronator drift  Coordination: Coordination is intact   Finger-Nose-Finger Test and Heel to [facility] Test normal    Psychiatric:         Mood and Affect: Mood normal          ED Medications  Medications - No data to display    Diagnostic Studies  Results Reviewed     Procedure Component Value Units Date/Time    HS Troponin I 2hr [[id number]]  (Normal) Collected: 03/22/23 1124    Lab Status: Final result Specimen: Blood from Arm, Right Updated: 03/22/23 1201     hs TnI 2hr 3 ng/L      Delta 2hr hsTnI -1 ng/L     Comprehensive metabolic panel [[id number]]  (Abnormal) Collected: 03/22/23 0911    Lab Status: Final result Specimen: Blood from Arm, Left Updated: 03/22/23 1004     Sodium 136 mmol/L      Potassium 4 4 mmol/L      Chloride 109 mmol/L      CO2 24 mmol/L ANION GAP 3 mmol/L      BUN 15 mg/dL      Creatinine 1 08 mg/dL      Glucose 130 mg/dL      Calcium 8 8 mg/dL      AST 48 U/L      ALT 56 U/L      Alkaline Phosphatase 96 U/L      Total Protein 7 9 g/dL      Albumin 3 8 g/dL      Total Bilirubin 0 58 mg/dL      eGFR 72 ml/min/1 73sq m     Narrative:      National Kidney Disease Foundation guidelines for Chronic Kidney Disease (CKD):   •  Stage 1 with normal or high GFR (GFR > 90 mL/min/1 73 square meters)  •  Stage 2 Mild CKD (GFR = 60-89 mL/min/1 73 square meters)  •  Stage 3A Moderate CKD (GFR = 45-59 mL/min/1 73 square meters)  •  Stage 3B Moderate CKD (GFR = 30-44 mL/min/1 73 square meters)  •  Stage 4 Severe CKD (GFR = 15-29 mL/min/1 73 square meters)  •  Stage 5 End Stage CKD (GFR <15 mL/min/1 73 square meters)  Note: GFR calculation is accurate only with a steady state creatinine    HS Troponin 0hr (reflex protocol) [[id number]]  (Normal) Collected: 03/22/23 0911    Lab Status: Final result Specimen: Blood from Arm, Left Updated: 03/22/23 1003     hs TnI 0hr 4 ng/L     CBC and differential [[id number]] Collected: 03/22/23 0911    Lab Status: Final result Specimen: Blood from Arm, Left Updated: 03/22/23 0931     WBC 10 10 Thousand/uL      RBC 4 68 Million/uL      Hemoglobin 15 4 g/dL      Hematocrit 43 5 %      MCV 93 fL      MCH 32 9 pg      MCHC 35 4 g/dL      RDW 13 1 %      MPV 9 2 fL      Platelets 937 Thousands/uL      nRBC 0 /100 WBCs      Neutrophils Relative 55 %      Immat GRANS % 0 %      Lymphocytes Relative 36 %      Monocytes Relative 7 %      Eosinophils Relative 1 %      Basophils Relative 1 %      Neutrophils Absolute 5 62 Thousands/µL      Immature Grans Absolute 0 02 Thousand/uL      Lymphocytes Absolute 3 60 Thousands/µL      Monocytes Absolute 0 67 Thousand/µL      Eosinophils Absolute 0 12 Thousand/µL      Basophils Absolute 0 07 Thousands/µL                  XR chest 2 views   Final Result by Evaristo Licona MD (03/22 4386)      No acute cardiopulmonary disease  Workstation performed: [id number]               Procedures  Procedures      ED Course  ED Course as of 03/22/23 1931   Wed Mar 22, 2023   1004 Procedure Note: EKG  Date/Time: 03/22/23 10:04 AM   Interpreted by: Shazia Herrera   Indications / Diagnosis: CP  ECG reviewed by me, the ED Provider: yes   The EKG demonstrates:  Rhythm: normal sinus  Intervals: normal intervals  Axis: normal axis  QRS/Blocks: normal QRS  ST Changes: No acute ST Changes, no STD/TRISTEN  HEART Risk Score    Flowsheet Row Most Recent Value   Heart Score Risk Calculator    History 0 Filed at: 03/22/2023 1209   ECG 0 Filed at: 03/22/2023 1209   Age 1 Filed at: 03/22/2023 1209   Risk Factors 1 Filed at: 03/22/2023 1209   Troponin 0 Filed at: 03/22/2023 1209   HEART Score 2 Filed at: 03/22/2023 1209                      SBIRT 22yo+    Flowsheet Row Most Recent Value   SBIRT (23 yo +)    In order to provide better care to our patients, we are screening all of our patients for alcohol and drug use  Would it be okay to ask you these screening questions? No Filed at: 03/22/2023 1208                Medical Decision Making  Patient is a 41-year-old male presenting for evaluation of chest pressure and lightheadedness  Based on history and evaluation, differential diagnosis includes but is not limited to: Vasovagal near syncope, orthostatic hypotension with near syncope, viral URI, pneumonia, less suspicious for ACS, heart score 2, see above  Plan: CBC, CMP, troponin, ECG, chest x-ray, reassessment    Patient's labs largely unremarkable, including delta troponin  ECG as independently interpreted by me in the ED course  Chest x-ray without acute cardiopulmonary disease on my independent interpretation  On reassessment, patient states that his symptoms are significantly improved  Presentation most consistent with viral URI/bronchitis with a near syncopal episode and nonemergent chest pain    He feels comfortable being discharged home and is requesting something for his cough  Prescription for Lee Akhtar sent to patient pharmacy  Stable for discharge home with primary care follow-up  Patient seems to understand this plan and is agreeable  All questions answered  Patient discharged home with return precautions  I independently reviewed the patient's chart including his most recent office visit  I considered the patient's chronic past medical history including his previous ablation and current Eliquis use and my medical decision making  I obtained information by nursing from report given by medical staff who brought patient to the ED that would be unavailable to me by the patient himself  Amount and/or Complexity of Data Reviewed  Labs: ordered  Radiology: ordered  Risk  Prescription drug management  Disposition  Final diagnoses:   Chest pain, unspecified type   Near syncope   Cough     Time reflects when diagnosis was documented in both MDM as applicable and the Disposition within this note     Time User Action Codes Description Comment    3/22/2023 11:17 AM Slava COLEMAN Add [R07 9] Chest pain, unspecified type     3/22/2023 11:17 AM Shanta Sanchez Add [R55] Near syncope     3/22/2023 12:53 PM Clayton, [address] [R05 9] Cough       ED Disposition     ED Disposition   Discharge    Condition   Stable    Date/Time   Wed Mar 22, 2023 12:16 PM    Comment   Micah Huffman discharge to home/self care                 Follow-up Information     Follow up With Specialties Details Why Contact Info    Sharmin Clayton, DO Family Medicine Go in 2 days  [address] [address] [phone]            Discharge Medication List as of 3/22/2023 12:54 PM      START taking these medications    Details   benzonatate (TESSALON PERLES) 100 mg capsule Take 1 capsule (100 mg total) by mouth every 8 (eight) hours, Starting Wed 3/22/2023, Normal         CONTINUE these medications which have NOT CHANGED    Details   apixaban (Eliquis) 5 mg Take 1 tablet (5 mg total) by mouth 2 (two) times a day, Starting Fri 1/6/2023, Normal      atorvastatin (LIPITOR) 10 mg tablet Take 1 tablet (10 mg total) by mouth daily, Starting Fri 1/6/2023, Normal      diltiazem (CARDIZEM CD) 180 mg 24 hr capsule Take 1 capsule (180 mg total) by mouth daily, Starting Mon 3/13/2023, Normal      fluticasone (FLONASE) 50 mcg/act nasal spray 2 sprays into each nostril daily, Historical Med      losartan (COZAAR) 25 mg tablet Take 1 tablet (25 mg total) by mouth daily, Starting Fri 1/6/2023, Normal      oxyCODONE (Roxicodone) 5 immediate release tablet Take 1 tablet (5 mg total) by mouth every 4 (four) hours as needed for moderate pain or severe pain for up to 10 doses Max Daily Amount: 30 mg, Starting Thu 11/3/2022, Normal           No discharge procedures on file  PDMP Review     None           ED Provider  Attending physically available and evaluated Orvel Judaism  I managed the patient along with the ED Attending      Electronically Signed by         Barney Maria DO  03/22/23 1931

## 2023-03-27 ENCOUNTER — OFFICE VISIT (OUTPATIENT)
Dept: FAMILY MEDICINE CLINIC | Facility: CLINIC | Age: 63
End: 2023-03-27

## 2023-03-27 VITALS
SYSTOLIC BLOOD PRESSURE: 150 MMHG | DIASTOLIC BLOOD PRESSURE: 70 MMHG | TEMPERATURE: 97.1 F | BODY MASS INDEX: 29.45 KG/M2 | HEIGHT: 72 IN | HEART RATE: 71 BPM | WEIGHT: 217.4 LBS | OXYGEN SATURATION: 98 %

## 2023-03-27 DIAGNOSIS — I10 ESSENTIAL HYPERTENSION: ICD-10-CM

## 2023-03-27 DIAGNOSIS — I48.92 ATRIAL FLUTTER, UNSPECIFIED TYPE (HCC): Primary | ICD-10-CM

## 2023-03-27 DIAGNOSIS — Z79.01 ANTICOAGULATED: ICD-10-CM

## 2023-03-27 DIAGNOSIS — E78.00 HYPERCHOLESTEROLEMIA: ICD-10-CM

## 2023-03-27 DIAGNOSIS — R07.9 CHEST PAIN, UNSPECIFIED TYPE: ICD-10-CM

## 2023-03-27 RX ORDER — BUDESONIDE 1 MG/2ML
INHALANT ORAL
COMMUNITY
Start: 2023-01-09

## 2023-03-27 NOTE — PROGRESS NOTES
Assessment/Plan:    Atrial flutter (HCC)  Heart rate is overall within a good range  He is currently on Eliquis and Cardizem  Overall doing okay  Continue follow-up with cardiology  Essential hypertension  Looks like not very well controlled right now  We will continue current dose of losartan, will switch it to the nighttime  Continue Cardizem  Follow-up in 2 weeks, he will bring his blood pressure readings and blood pressure cuff  Limit salt intake  Avoid NSAIDs  Anticoagulated  He is currently on Eliquis, tolerates well  Hypercholesterolemia  He is doing great on current dose of atorvastatin, LDL was great on the last blood work  Chest pain  Recent episode of chest pain that currently resolved  Troponins were negative  EKG was okay  Likely musculoskeletal due 2 recent episode of URI with chronic cough  Continue benzonatate 200 mg every 8 hours as needed  X-ray was negative  If no resolution in few weeks he will let us know  Diagnoses and all orders for this visit:    Atrial flutter, unspecified type ([address] )    Anticoagulated    Hypercholesterolemia    Essential hypertension    Chest pain, unspecified type    Other orders  -     budesonide (PULMICORT) 1 MG/2ML nebulizer solution          Subjective:      Patient ID: Albin Mckinnon is a 60 y o  male  Patient came today for follow-up after recent visit to emergency room due to chest pain and lightheadedness which is currently resolved  He still reports some discomfort when he take a deep breath or when he sneezes  Reports some chronic cough that still did not resolve  EKG, chest x-ray and blood work in the emergency room overall came back to be good        The following portions of the patient's history were reviewed and updated as appropriate: allergies, current medications, past family history, past medical history, past social history, past surgical history, and problem list     Review of Systems   Constitutional: Negative for appetite change, chills, fatigue and fever  HENT: Negative for rhinorrhea, sinus pain and sore throat  Respiratory: Positive for cough  Negative for chest tightness and shortness of breath  Cardiovascular: Negative for chest pain, palpitations and leg swelling  Gastrointestinal: Negative for diarrhea, nausea and vomiting  Musculoskeletal: Negative for arthralgias and myalgias  Objective:      /70 (BP Location: Left arm, Patient Position: Sitting, Cuff Size: Large)   Pulse 71   Temp (!) 97 1 °F (36 2 °C) (Tympanic)   Ht 6' (1 829 m)   Wt 98 6 kg (217 lb 6 4 oz)   SpO2 98%   BMI 29 48 kg/m²     Allergies   Allergen Reactions   • Dust Mite Extract    • Lisinopril Edema     [facility] - [id number]: lip swelling x 2 2015   • Short Ragweed Pollen Ext           Current Outpatient Medications:   •  apixaban (Eliquis) 5 mg, Take 1 tablet (5 mg total) by mouth 2 (two) times a day, Disp: 180 tablet, Rfl: 3  •  atorvastatin (LIPITOR) 10 mg tablet, Take 1 tablet (10 mg total) by mouth daily, Disp: 90 tablet, Rfl: 3  •  benzonatate (TESSALON PERLES) 100 mg capsule, Take 1 capsule (100 mg total) by mouth every 8 (eight) hours, Disp: 21 capsule, Rfl: 0  •  budesonide (PULMICORT) 1 MG/2ML nebulizer solution, , Disp: , Rfl:   •  diltiazem (CARDIZEM CD) 180 mg 24 hr capsule, Take 1 capsule (180 mg total) by mouth daily, Disp: 90 capsule, Rfl: 0  •  fluticasone (FLONASE) 50 mcg/act nasal spray, 2 sprays into each nostril daily, Disp: , Rfl:   •  losartan (COZAAR) 25 mg tablet, Take 1 tablet (25 mg total) by mouth daily, Disp: 90 tablet, Rfl: 3     Patient Instructions   Check your blood pressure at home twice a day first time in the morning second time during the day at your convenience  Write down your numbers on a piece of paper, I need at least 7 to 10 days of readings  Bring these numbers to me in 2 weeks with your blood pressure cuff  Please switch your losartan to at night dosage  Physical Exam  Vitals reviewed  Constitutional:       General: He is not in acute distress  Appearance: He is not toxic-appearing  HENT:      Head: Normocephalic  Cardiovascular:      Rate and Rhythm: Normal rate  Pulses: Normal pulses  Heart sounds: No murmur heard  No gallop  Pulmonary:      Effort: Pulmonary effort is normal  No respiratory distress  Breath sounds: No wheezing or rales  Skin:     General: Skin is warm  Neurological:      General: No focal deficit present  Mental Status: He is alert     Psychiatric:         Mood and Affect: Mood normal          Behavior: Behavior normal

## 2023-03-27 NOTE — ASSESSMENT & PLAN NOTE
Recent episode of chest pain that currently resolved  Troponins were negative  EKG was okay  Likely musculoskeletal due 2 recent episode of URI with chronic cough  Continue benzonatate 200 mg every 8 hours as needed  X-ray was negative  If no resolution in few weeks he will let us know

## 2023-03-27 NOTE — ASSESSMENT & PLAN NOTE
Looks like not very well controlled right now  We will continue current dose of losartan, will switch it to the nighttime  Continue Cardizem  Follow-up in 2 weeks, he will bring his blood pressure readings and blood pressure cuff  Limit salt intake  Avoid NSAIDs

## 2023-03-27 NOTE — PATIENT INSTRUCTIONS
Check your blood pressure at home twice a day first time in the morning second time during the day at your convenience  Write down your numbers on a piece of paper, I need at least 7 to 10 days of readings  Bring these numbers to me in 2 weeks with your blood pressure cuff  Please switch your losartan to at night dosage

## 2023-03-27 NOTE — ASSESSMENT & PLAN NOTE
Heart rate is overall within a good range  He is currently on Eliquis and Cardizem  Overall doing okay  Continue follow-up with cardiology

## 2023-04-01 DIAGNOSIS — I48.92 ATRIAL FLUTTER, UNSPECIFIED TYPE (HCC): ICD-10-CM

## 2023-04-01 DIAGNOSIS — E78.00 HYPERCHOLESTEROLEMIA: ICD-10-CM

## 2023-04-01 DIAGNOSIS — I10 ESSENTIAL HYPERTENSION: ICD-10-CM

## 2023-04-03 DIAGNOSIS — R05.9 COUGH, UNSPECIFIED TYPE: Primary | ICD-10-CM

## 2023-04-03 RX ORDER — LOSARTAN POTASSIUM 25 MG/1
25 TABLET ORAL DAILY
Qty: 90 TABLET | Refills: 0 | Status: SHIPPED | OUTPATIENT
Start: 2023-04-03

## 2023-04-03 RX ORDER — ATORVASTATIN CALCIUM 10 MG/1
10 TABLET, FILM COATED ORAL DAILY
Qty: 90 TABLET | Refills: 0 | Status: SHIPPED | OUTPATIENT
Start: 2023-04-03

## 2023-04-03 RX ORDER — BENZONATATE 200 MG/1
200 CAPSULE ORAL 3 TIMES DAILY PRN
Qty: 30 CAPSULE | Refills: 0 | Status: SHIPPED | OUTPATIENT
Start: 2023-04-03

## 2023-04-19 ENCOUNTER — APPOINTMENT (OUTPATIENT)
Dept: LAB | Age: 63
End: 2023-04-19

## 2023-04-19 DIAGNOSIS — I10 ESSENTIAL HYPERTENSION: ICD-10-CM

## 2023-04-19 LAB
ANION GAP SERPL CALCULATED.3IONS-SCNC: 3 MMOL/L (ref 4–13)
BUN SERPL-MCNC: 16 MG/DL (ref 5–25)
CALCIUM SERPL-MCNC: 8.7 MG/DL (ref 8.3–10.1)
CHLORIDE SERPL-SCNC: 109 MMOL/L (ref 96–108)
CO2 SERPL-SCNC: 26 MMOL/L (ref 21–32)
CREAT SERPL-MCNC: 0.98 MG/DL (ref 0.6–1.3)
GFR SERPL CREATININE-BSD FRML MDRD: 81 ML/MIN/1.73SQ M
GLUCOSE SERPL-MCNC: 107 MG/DL (ref 65–140)
POTASSIUM SERPL-SCNC: 4.1 MMOL/L (ref 3.5–5.3)
SODIUM SERPL-SCNC: 138 MMOL/L (ref 135–147)

## 2023-04-27 ENCOUNTER — TELEPHONE (OUTPATIENT)
Dept: FAMILY MEDICINE CLINIC | Facility: CLINIC | Age: 63
End: 2023-04-27

## 2023-04-27 NOTE — TELEPHONE ENCOUNTER
DENNY for pt to continue his blood pressure medication and to follow up with Dr Adan Goodwin per Dr Sondra Harrington

## 2023-05-19 NOTE — PLAN OF CARE
INFECTION - ADULT     Absence of fever/infection during neutropenic period Completed          CARDIOVASCULAR - ADULT     Maintains optimal cardiac output and hemodynamic stability Progressing     Absence of cardiac dysrhythmias or at baseline rhythm Progressing        DISCHARGE PLANNING     Discharge to home or other facility with appropriate resources Progressing        INFECTION - ADULT     Absence or prevention of progression during hospitalization Progressing        Knowledge Deficit     Patient/family/caregiver demonstrates understanding of disease process, treatment plan, medications, and discharge instructions Progressing        PAIN - ADULT     Verbalizes/displays adequate comfort level or baseline comfort level Progressing        Potential for Falls     Patient will remain free of falls Progressing        SAFETY ADULT     Maintain or return to baseline ADL function Progressing     Maintain or return mobility status to optimal level Progressing yes

## 2023-05-25 NOTE — PROGRESS NOTES
[facility] Follow Up Note    Patient Name: Terrence Monet  Encounter Date: 10/20/2022    Today's Chief Concerns:  • Concern #1:  Skin check  • Concern #2:  Lesion on left nose    Current Medications:    Current Outpatient Medications:   •  apixaban (Eliquis) 5 mg, Take 1 tablet (5 mg total) by mouth 2 (two) times a day, Disp: 180 tablet, Rfl: 3  •  atorvastatin (LIPITOR) 10 mg tablet, Take 1 tablet (10 mg total) by mouth daily, Disp: 90 tablet, Rfl: 3  •  diltiazem (CARDIZEM CD) 180 mg 24 hr capsule, Take 1 capsule (180 mg total) by mouth daily, Disp: 90 capsule, Rfl: 3  •  fluticasone (FLONASE) 50 mcg/act nasal spray, 2 sprays into each nostril daily, Disp: , Rfl:   •  losartan (COZAAR) 25 mg tablet, Take 1 tablet (25 mg total) by mouth daily, Disp: 90 tablet, Rfl: 3    CONSTITUTIONAL:   Vitals:    10/20/22 1543   Temp: 98 °F (36 7 °C)   TempSrc: Temporal   Weight: 95 7 kg (211 lb)   Height: 6' (1 829 m)       Specific Alerts:    Have you been seen by a [facility]ologist in the last 3 years? YES    Are you pregnant or planning to become pregnant? N/A    Are you currently or planning to be nursing or breast feeding? N/A    Allergies   Allergen Reactions   • Dust Mite Extract    • Lisinopril Edema     [facility] - [id number]: lip swelling x 2 2015   • Short Ragweed Pollen Ext        May we call your Preferred Phone number to discuss your specific medical information? YES    May we leave a detailed message that includes your specific medical information? YES    Have you traveled outside of the [facility] in the past 3 months? No    Do you currently have a pacemaker or defibrillator? No    Do you have any artificial heart valves, joints, plates, screws, rods, stents, pins, etc? No   - If Yes, were any placed within the last 2 years? Do you require any medications prior to a surgical procedure? No   - If Yes, for which procedure? - If Yes, what medications to you require? Are you taking any medications that cause you to bleed more easily ("blood thinners") YES, eliquis for A-Fib    Have you ever experienced a rapid heartbeat with epinephrine? No    Have you ever been treated with "gold" (gold sodium thiomalate) therapy? No    [facility] can help with wrinkles, "laugh lines," facial volume loss, "double chin," "love handles," age spots, and more  Are you interested in learning today about some of the skin enhancement procedures that we offer? (If Yes, please provide more detail) No    Review of Systems:  Have you recently had or currently have any of the following?     · Fever or chills: No  · Night Sweats: No  · Headaches: No  · Weight Gain: No  · Weight Loss: No  · Blurry Vision: No  · Nausea: No  · Vomiting: No  · Diarrhea: No  · Blood in Stool: No  · Abdominal Pain: No  · Itchy Skin: No  · Painful Joints: No  · Swollen Joints: No  · Muscle Pain: No  · Irregular Mole: No  · Sun Burn: No  · Dry Skin: No  · Skin Color Changes: No  · Scar or Keloid: No  · Cold Sores/Fever Blisters: No  · Bacterial Infections/MRSA: No  · Anxiety: No  · Depression: No  · Suicidal or Homicidal Thoughts: No      PSYCH: Normal mood and affect  EYES: Normal conjunctiva  ENT: Normal lips and oral mucosa  CARDIOVASCULAR: No edema  RESPIRATORY: Normal respirations  HEME/LYMPH/IMMUNO:  No regional lymphadenopathy except as noted below in ASSESSMENT AND PLAN BY DIAGNOSIS    FULL ORGAN SYSTEM SKIN EXAM (SKIN)   Hair, Scalp, Ears, Face Normal except as noted below in Assessment   Neck, Cervical Chain Nodes Normal except as noted below in Assessment   Right Arm/Hand/Fingers Normal except as noted below in Assessment   Left Arm/Hand/Fingers Normal except as noted below in Assessment   Chest/Breasts/Axillae Viewed areas Normal except as noted below in Assessment   Abdomen, Umbilicus Normal except as noted below in Assessment   Back/Spine Normal except as noted below in Assessment   Groin/Buttocks Viewed areas Normal except as noted below in Assessment   Right Leg Normal except as noted below in Assessment   Left Leg Normal except as noted below in Assessment       1  SCREENING FOR SKIN CANCER    Physical Exam:  • Anatomic Location Affected: Full body  • Morphological Description:  Normal appearing skin      Additional History of Present Condition:  Patient reports no history or family history of skin cancer    Assessment and Plan:  Based on a thorough discussion of this condition and the management approach to it (including a comprehensive discussion of the known risks, side effects and potential benefits of treatment), the patient (family) agrees to implement the following specific plan:  • Recommend sun protection SPF 30 or higher, wearing a wide brimmed hat and sun protective clothing    2  LENTIGO    Physical Exam:  • Anatomic Location Affected:  Left medial nasal bridge  • Morphological Description:  Tan macule      Additional History of Present Condition:  Present for 3-4 years    Assessment and Plan:  Based on a thorough discussion of this condition and the management approach to it (including a comprehensive discussion of the known risks, side effects and potential benefits of treatment), the patient (family) agrees to implement the following specific plan:  • Begin cream from skin medicinals apply at night  Tretinoin: 0 025%  Niacinamide: 2%  Hyaluronic Acid: 0 25%  Vehicle: Cream         What is a lentigo? A lentigo is a pigmented flat or slightly raised lesion with a clearly defined edge  Unlike an ephelis (freckle), it does not fade in the winter months  There are several kinds of lentigo  The name lentigo originally referred to its appearance resembling a small lentil  The plural of lentigo is lentigines, although “lentigos” is also in common use  Who gets lentigines? Lentigines can affect males and females of all ages and races  Solar lentigines are especially prevalent in fair skinned adults  Lentigines associated with syndromes are present at birth or arise during childhood  What causes lentigines? Common forms of lentigo are due to exposure to ultraviolet radiation:  • Sun damage including sunburn   • Indoor tanning   • Phototherapy, especially photochemotherapy (PUVA)    Ionizing radiation, eg radiation therapy, can also cause lentigines  Several familial syndromes associated with widespread lentigines originate from mutations in Ruperto-MAP kinase, mTOR signaling and PTEN pathways  What are the clinical features of lentigines? Lentigines have been classified into several different types depending on what they look like, where they appear on the body, causative factors, and whether they are associated to other diseases or conditions  Lentigines may be solitary or more often, multiple  Most lentigines are smaller than 5 mm in diameter      Lentigo simplex  • A precursor to junctional naevus   • Arises during childhood and early adult life   • Found on trunk and limbs   • Small brown round or oval macule or thin plaque   • Jagged or smooth edge   • May have a dry surface   • May disappear in time  Solar lentigo  • A precursor to seborrhoeic keratosis   • Found on chronically sun exposed sites such as hands, face, lower legs   • May also follow sunburn to shoulders   • Yellow, light or dark brown regular or irregular macule or thin plaque   • May have a dry surface   • Often has moth-eaten outline   • Can slowly enlarge to several centimeters in diameter   • May disappear, often through the process known as lichenoid keratosis   • When atypical in appearance, may be difficult to distinguish from melanoma in situ  Ink spot lentigo  • Also known as reticulated lentigo   • Few in number compared to solar lentigines   • Follows sunburn in very fair skinned individuals   • Dark brown to black irregular ink spot-like macule  PUVA lentigo  • Similar to ink spot lentigo but follows photochemotherapy (PUVA) • Location anywhere exposed to PUVA  Tanning bed lentigo  • Similar to ink spot lentigo but follows indoor tanning   • Location anywhere exposed to tanning bed  Radiation lentigo  • Occurs in site of irradiation (accidental or therapeutic)   • Associated with late-stage radiation dermatitis: epidermal atrophy, subcutaneous fibrosis, keratosis, telangiectasias  Melanotic macule  • Mucosal surfaces or adjacent glabrous skin eg lip, vulva, penis, anus   • Light to dark brown   • Also called mucosal melanosis  Generalised lentigines  • Found on any exposed or covered site from early childhood   • Small macules may merge to form larger patches   • Not associated with a syndrome   • Also called lentigines profusa, multiple lentigines  Agminated lentigines  • Naevoid eruption of lentigos confined to a single segmental area   • Sharp demarcation in midline   • May have associated neurological and developmental abnormalities  Patterned lentigines  • Inherited tendency to lentigines on face, lips, buttocks, palms, soles   • Recognised mainly in people of  ethnicity  Centrofacial neurodysraphic lentiginosis  Associated with mental retardation  Lentiginosis syndromes  • Syndromes include LEOPARD/Luverne, Peutz-Jeghers, Laugier-Hunziker, Moynahan, Xeroderma pigmentosum, myxoma syndromes (BARROS, NAME, Gregory), Ruvalcaba-Myhre-Hendrickson, Bannayan-Zonnana syndrome, Cowden disease (multiple hamartoma syndrome )   • Inheritance is autosomal dominant; sporadic cases common   • Widespread lentigines present at birth or arise in early childhood   • Associated with neural, endocrine, and mesenchymal tumors    How is the diagnosis made? Lentigines are usually diagnosed clinically by their typical appearance   Concern regarding possibility of melanoma may lead to:  • Dermatoscopy   • Diagnostic excision biopsy    Histopathology of a lentigo shows:  • Thickened epidermis   • An increased number of melanocytes along the basal layer of epidermis   • Unlike junctional melanocytic naevus, there are no nests of melanocytes   • Increased melanin pigment within the keratinocytes   • Additional features depending on type of lentigo    In contrast, an ephelis (freckle) shows sun-induced increased melanin within the keratinocytes, without an increase in number of cells  What is the treatment for lentigines? Most lentigines are left alone  Attempts to lighten them may not be successful  The following approaches are used:  • SPF 50+ broad-spectrum sunscreen   • Hydroquinone bleaching cream   • Alpha hydroxy acids   • Vitamin C   • Retinoids   • Azelaic acid   • Chemical peels  Individual lesions can be permanently removed using:  • Cryotherapy   • Intense pulsed light   • Pigment lasers    How can lentigines be prevented? Lentigines associated with exposure ultraviolet radiation can be prevented by very careful sun protection  Clothing is more successful at preventing new lentigines than are sunscreens  What is the outlook for lentigines? Lentigines usually persist  They may increase in number with age and sun exposure  Some in sun-protected sites may fade and disappear    Scribe Attestation    I,:  Fay Weir MA am acting as a scribe while in the presence of the attending physician :       I,:  Cynthia Arcos MD personally performed the services described in this documentation    as scribed in my presence : No

## 2023-06-08 PROBLEM — R07.9 CHEST PAIN: Status: RESOLVED | Noted: 2023-03-27 | Resolved: 2023-06-08

## 2023-06-08 PROBLEM — I48.0 PAROXYSMAL ATRIAL FIBRILLATION (HCC): Status: ACTIVE | Noted: 2018-12-28

## 2023-06-08 NOTE — PATIENT INSTRUCTIONS
He is doing well here today  BP today is 132/80 after sitting, he will continue losartan 100 mg daily along with diltiazem 180 mg daily  Hx ablation 3/2022, continue Eliquis, he will see Cardiology next month for follow-up    We did review previous blood work, slip given for fasting BW/ UA  He is up to date with Lipid screening  Stay on Atorvastatin  He is up to date with Diabetes screening  Do A1C for insurance diabetes screen    Reviewed ER visit w neg testing 3/22    Immunization History   Administered Date(s) Administered    COVID-19 MODERNA VACC 0 5 ML IM 12/28/2020, 01/24/2021, 11/01/2021, 05/03/2022    COVID-19 Pfizer Vac BIVALENT Micah-sucrose 12 Yr+ IM (BOOSTER ONLY) 10/19/2022    INFLUENZA 10/05/2015, 10/01/2018, 10/19/2020, 10/01/2021, 10/03/2022    Influenza Quadrivalent Preservative Free 3 years and older IM 10/30/2014, 10/01/2016, 10/01/2018    Influenza Quadrivalent, 6-35 Months IM 10/20/2015    Tdap 02/01/2015    Zoster Vaccine Recombinant 12/24/2019, 05/08/2020     Continue yearly flu shot in the fall, tetanus up-to-date, already received shingles shot  COVID-vaccine up-to-date    He is a non-smoker -   quit 2014-  was 1/2 PPD x 30 ( 15 pack yrs )    CT chest 6/22 was stable re nodules  Consider redo 1 yr     Regarding Colon Cancer screening,   Screening is up to date  3rd scope 2015 was ok -redo 2025    Discussed Prostate Cancer screening pros/cons starting at age 48  PSA blood test  ordered  Hx small incidental kidney stone on CT in past  - observe    Continue to try to watch healthy diet, exercise routinely  Sees Dermatology / ENT ( had sinus surgery 11/22 )    We will see him again in 12 months, sooner as needed

## 2023-06-09 ENCOUNTER — OFFICE VISIT (OUTPATIENT)
Dept: FAMILY MEDICINE CLINIC | Facility: CLINIC | Age: 63
End: 2023-06-09
Payer: COMMERCIAL

## 2023-06-09 ENCOUNTER — APPOINTMENT (OUTPATIENT)
Dept: LAB | Facility: MEDICAL CENTER | Age: 63
End: 2023-06-09

## 2023-06-09 VITALS
HEIGHT: 72 IN | HEART RATE: 87 BPM | OXYGEN SATURATION: 96 % | WEIGHT: 215 LBS | BODY MASS INDEX: 29.12 KG/M2 | TEMPERATURE: 97.8 F | SYSTOLIC BLOOD PRESSURE: 132 MMHG | DIASTOLIC BLOOD PRESSURE: 80 MMHG

## 2023-06-09 DIAGNOSIS — I48.0 PAROXYSMAL ATRIAL FIBRILLATION (HCC): ICD-10-CM

## 2023-06-09 DIAGNOSIS — Z13.1 SCREENING FOR DIABETES MELLITUS: ICD-10-CM

## 2023-06-09 DIAGNOSIS — Z12.5 SCREENING PSA (PROSTATE SPECIFIC ANTIGEN): ICD-10-CM

## 2023-06-09 DIAGNOSIS — Z13.220 SCREENING, LIPID: ICD-10-CM

## 2023-06-09 DIAGNOSIS — Z98.890 S/P ABLATION OF ATRIAL FIBRILLATION: ICD-10-CM

## 2023-06-09 DIAGNOSIS — Z00.00 ENCOUNTER FOR ANNUAL PHYSICAL EXAM: Primary | ICD-10-CM

## 2023-06-09 DIAGNOSIS — E78.00 HYPERCHOLESTEROLEMIA: ICD-10-CM

## 2023-06-09 DIAGNOSIS — Z86.79 S/P ABLATION OF ATRIAL FIBRILLATION: ICD-10-CM

## 2023-06-09 DIAGNOSIS — Z79.01 ANTICOAGULATED: ICD-10-CM

## 2023-06-09 DIAGNOSIS — Z00.8 ENCOUNTER FOR OTHER GENERAL EXAMINATION: ICD-10-CM

## 2023-06-09 DIAGNOSIS — I10 ESSENTIAL HYPERTENSION: ICD-10-CM

## 2023-06-09 LAB
ALBUMIN SERPL BCP-MCNC: 3.8 G/DL (ref 3.5–5)
ALP SERPL-CCNC: 90 U/L (ref 46–116)
ALT SERPL W P-5'-P-CCNC: 47 U/L (ref 12–78)
ANION GAP SERPL CALCULATED.3IONS-SCNC: 0 MMOL/L (ref 4–13)
AST SERPL W P-5'-P-CCNC: 26 U/L (ref 5–45)
BACTERIA UR QL AUTO: ABNORMAL /HPF
BILIRUB SERPL-MCNC: 0.6 MG/DL (ref 0.2–1)
BILIRUB UR QL STRIP: NEGATIVE
BUN SERPL-MCNC: 19 MG/DL (ref 5–25)
CALCIUM SERPL-MCNC: 9 MG/DL (ref 8.3–10.1)
CHLORIDE SERPL-SCNC: 109 MMOL/L (ref 96–108)
CHOLEST SERPL-MCNC: 146 MG/DL
CLARITY UR: CLEAR
CO2 SERPL-SCNC: 28 MMOL/L (ref 21–32)
COLOR UR: ABNORMAL
CREAT SERPL-MCNC: 1.04 MG/DL (ref 0.6–1.3)
EST. AVERAGE GLUCOSE BLD GHB EST-MCNC: 108 MG/DL
GFR SERPL CREATININE-BSD FRML MDRD: 76 ML/MIN/1.73SQ M
GLUCOSE P FAST SERPL-MCNC: 94 MG/DL (ref 65–99)
GLUCOSE UR STRIP-MCNC: NEGATIVE MG/DL
HBA1C MFR BLD: 5.4 %
HDLC SERPL-MCNC: 61 MG/DL
HGB UR QL STRIP.AUTO: ABNORMAL
KETONES UR STRIP-MCNC: NEGATIVE MG/DL
LDLC SERPL CALC-MCNC: 70 MG/DL (ref 0–100)
LEUKOCYTE ESTERASE UR QL STRIP: NEGATIVE
NITRITE UR QL STRIP: NEGATIVE
NON-SQ EPI CELLS URNS QL MICRO: ABNORMAL /HPF
NONHDLC SERPL-MCNC: 85 MG/DL
PH UR STRIP.AUTO: 6 [PH]
POTASSIUM SERPL-SCNC: 4.6 MMOL/L (ref 3.5–5.3)
PROT SERPL-MCNC: 7.3 G/DL (ref 6.4–8.4)
PROT UR STRIP-MCNC: NEGATIVE MG/DL
PSA SERPL-MCNC: 1.01 NG/ML (ref 0–4)
RBC #/AREA URNS AUTO: ABNORMAL /HPF
SODIUM SERPL-SCNC: 137 MMOL/L (ref 135–147)
SP GR UR STRIP.AUTO: 1.01 (ref 1–1.03)
TRIGL SERPL-MCNC: 75 MG/DL
UROBILINOGEN UR STRIP-ACNC: <2 MG/DL
WBC #/AREA URNS AUTO: ABNORMAL /HPF

## 2023-06-09 PROCEDURE — 81001 URINALYSIS AUTO W/SCOPE: CPT | Performed by: FAMILY MEDICINE

## 2023-06-09 PROCEDURE — 80053 COMPREHEN METABOLIC PANEL: CPT | Performed by: FAMILY MEDICINE

## 2023-06-09 PROCEDURE — 80061 LIPID PANEL: CPT | Performed by: FAMILY MEDICINE

## 2023-06-09 PROCEDURE — 99396 PREV VISIT EST AGE 40-64: CPT | Performed by: FAMILY MEDICINE

## 2023-06-09 PROCEDURE — G0103 PSA SCREENING: HCPCS | Performed by: FAMILY MEDICINE

## 2023-06-09 PROCEDURE — 36415 COLL VENOUS BLD VENIPUNCTURE: CPT | Performed by: FAMILY MEDICINE

## 2023-06-09 PROCEDURE — 83036 HEMOGLOBIN GLYCOSYLATED A1C: CPT | Performed by: FAMILY MEDICINE

## 2023-06-09 NOTE — PROGRESS NOTES
BMI Counseling: Body mass index is 29 16 kg/m²  The BMI is above normal  Nutrition recommendations include encouraging healthy choices of fruits and vegetables  Exercise recommendations include exercising 3-5 times per week  Rationale for BMI follow-up plan is due to patient being overweight or obese  FAMILY PRACTICE OFFICE VISIT  [facility]  [address]  [address], [place], [address]      NAME: Medina Da Silva  AGE: 60 y o  SEX: male  : 1960   MRN: [id number]    DATE: 2023  TIME: 7:59 AM    Assessment and Plan     Problem List Items Addressed This Visit     S/P ablation of atrial fibrillation    Anticoagulated with Eliquis    Paroxysmal atrial fibrillation and flutter (HCC)    Essential hypertension    Relevant Orders    Urinalysis with microscopic    Comprehensive metabolic panel    Hypercholesterolemia   Other Visit Diagnoses     Encounter for annual physical exam    -  Primary    BMI 29 0-29 9,adult        Screening, lipid        Relevant Orders    Lipid panel    Screening for diabetes mellitus        Relevant Orders    Hemoglobin A1C    Screening PSA (prostate specific antigen)        Relevant Orders    PSA, Total Screen          Patient Instructions     He is doing well here today  BP today is 132/80 after sitting, he will continue losartan 100 mg daily along with diltiazem 180 mg daily  Hx ablation 3/2022, continue Eliquis, he will see Cardiology next month for follow-up    We did review previous blood work, slip given for fasting BW/ UA  He is up to date with Lipid screening  Stay on Atorvastatin  He is up to date with Diabetes screening     Do A1C for insurance diabetes screen    Reviewed ER visit w neg testing 3/22    Immunization History   Administered Date(s) Administered   • COVID-19 MODERNA VACC 0 5 ML IM 2020, 2021, 2021, 2022   • COVID-19 Pfizer Vac BIVALENT Micah-sucrose 12 Yr+ IM (BOOSTER ONLY) 10/19/2022   • INFLUENZA 10/05/2015, 10/01/2018, 10/19/2020, 10/01/2021, 10/03/2022   • Influenza Quadrivalent Preservative Free 3 years and older IM 10/30/2014, 10/01/2016, 10/01/2018   • Influenza Quadrivalent, 6-35 Months IM 10/20/2015   • Tdap 02/01/2015   • Zoster Vaccine Recombinant 12/24/2019, 05/08/2020     Continue yearly flu shot in the fall, tetanus up-to-date, already received shingles shot  COVID-vaccine up-to-date    He is a non-smoker -   quit 2014-  was 1/2 PPD x 30 ( 15 pack yrs )    CT chest 6/22 was stable re nodules  Consider redo 1 yr     Regarding Colon Cancer screening,   Screening is up to date  3rd scope 2015 was ok -redo 2025    Discussed Prostate Cancer screening pros/cons starting at age 48  PSA blood test  ordered  Hx small incidental kidney stone on CT in past  - observe    Continue to try to watch healthy diet, exercise routinely  Sees Dermatology / ENT ( had sinus surgery 11/22 )    We will see him again in 12 months, sooner as needed  Chief Complaint     Chief Complaint   Patient presents with   • Physical Exam       History of Present Illness   Danish Lopez is a 60y o -year-old male who is in today for his yearly check, he has been feeling well recently      Review of Systems   Review of Systems   Constitutional: Negative for appetite change, fatigue, fever and unexpected weight change  HENT: Negative for sore throat and trouble swallowing  Respiratory: Negative for cough (Does note some mucus, phlegm especially in the morning, may have degree acid reflux, no acid wash, does use Tums in the evening  No daytime cough), chest tightness, shortness of breath and wheezing  Cardiovascular: Negative for chest pain, palpitations and leg swelling  Gastrointestinal: Negative for abdominal pain, blood in stool, nausea and vomiting  No change in bowel   Genitourinary: Negative for dysuria and hematuria     Neurological: Negative for dizziness, syncope, light-headedness and headaches  Psychiatric/Behavioral: Negative for behavioral problems and confusion  Active Problem List     Patient Active Problem List   Diagnosis   • Allergic rhinitis   • Hypercholesterolemia   • Essential hypertension   • Migraine headache   • Nasal polyp   • BPH associated with nocturia   • Kidney stone   • Pulmonary nodule   • Paroxysmal atrial fibrillation and flutter (HCC)   • Anticoagulated with Eliquis   • Former smoker   • Palpitations   • S/P ablation of atrial fibrillation       Past Medical History:  Reviewed    Past Surgical History:  Reviewed    Family History:  Reviewed    Social History:  Reviewed    Objective     Vitals:    06/09/23 0734   BP: 132/80   BP Location: Left arm   Patient Position: Sitting   Cuff Size: Large   Pulse: 87   Temp: 97 8 °F (36 6 °C)   SpO2: 96%   Weight: 97 5 kg (215 lb)   Height: 6' (1 829 m)     Body mass index is 29 16 kg/m²  BP Readings from Last 3 Encounters:   06/09/23 132/80   04/10/23 156/90   03/27/23 150/70       Wt Readings from Last 3 Encounters:   06/09/23 97 5 kg (215 lb)   04/20/23 98 kg (216 lb)   04/10/23 98 kg (216 lb)       Physical Exam  Constitutional:       General: He is not in acute distress  Appearance: Normal appearance  He is well-developed  He is not ill-appearing  HENT:      Right Ear: Tympanic membrane normal       Left Ear: Tympanic membrane normal       Mouth/Throat:      Pharynx: Oropharynx is clear  Eyes:      General: No scleral icterus  Neck:      Vascular: No carotid bruit  Cardiovascular:      Rate and Rhythm: Normal rate and regular rhythm  Heart sounds: Normal heart sounds  No murmur heard  Pulmonary:      Effort: Pulmonary effort is normal  No respiratory distress  Breath sounds: Normal breath sounds  No wheezing, rhonchi or rales  Abdominal:      Tenderness: There is no abdominal tenderness  Musculoskeletal:      Right lower leg: No edema        Left lower leg: No edema  Lymphadenopathy:      Cervical: No cervical adenopathy  Skin:     Coloration: Skin is not jaundiced  Neurological:      Mental Status: He is alert and oriented to person, place, and time  Mental status is at baseline  Psychiatric:         Mood and Affect: Mood normal          Behavior: Behavior normal          ALLERGIES:  Allergies   Allergen Reactions   • Dust Mite Extract    • Lisinopril Edema     [facility] [id number]: lip swelling x 2 2015   • Short Ragweed Pollen Ext        Current Medications     Current Outpatient Medications   Medication Sig Dispense Refill   • apixaban (Eliquis) 5 mg Take 1 tablet (5 mg total) by mouth 2 (two) times a day 180 tablet 0   • atorvastatin (LIPITOR) 10 mg tablet Take 1 tablet (10 mg total) by mouth daily 90 tablet 0   • azelastine (ASTELIN) 0 1 % nasal spray 1 spray into each nostril 2 (two) times a day Use in each nostril as directed     • diltiazem (CARDIZEM CD) 180 mg 24 hr capsule Take 1 capsule (180 mg total) by mouth daily 90 capsule 0   • losartan (COZAAR) 100 MG tablet Take 1 tablet (100 mg total) by mouth daily 90 tablet 0     No current facility-administered medications for this visit              Orders Placed This Encounter   Procedures   • PSA, Total Screen   • Lipid panel   • Urinalysis with microscopic   • Hemoglobin A1C   • Comprehensive metabolic panel         Francisca Romero DO

## 2023-06-12 DIAGNOSIS — I48.92 ATRIAL FLUTTER, UNSPECIFIED TYPE (HCC): ICD-10-CM

## 2023-06-12 DIAGNOSIS — E78.00 HYPERCHOLESTEROLEMIA: ICD-10-CM

## 2023-06-12 DIAGNOSIS — I10 ESSENTIAL HYPERTENSION: ICD-10-CM

## 2023-06-12 RX ORDER — LOSARTAN POTASSIUM 100 MG/1
100 TABLET ORAL DAILY
Qty: 90 TABLET | Refills: 0 | Status: SHIPPED | OUTPATIENT
Start: 2023-06-12

## 2023-06-12 RX ORDER — ATORVASTATIN CALCIUM 10 MG/1
10 TABLET, FILM COATED ORAL DAILY
Qty: 90 TABLET | Refills: 0 | Status: SHIPPED | OUTPATIENT
Start: 2023-06-12

## 2023-06-12 RX ORDER — DILTIAZEM HYDROCHLORIDE 180 MG/1
180 CAPSULE, COATED, EXTENDED RELEASE ORAL DAILY
Qty: 90 CAPSULE | Refills: 0 | Status: SHIPPED | OUTPATIENT
Start: 2023-06-12

## 2023-07-11 ENCOUNTER — OFFICE VISIT (OUTPATIENT)
Dept: CARDIOLOGY CLINIC | Facility: CLINIC | Age: 63
End: 2023-07-11
Payer: COMMERCIAL

## 2023-07-11 VITALS
BODY MASS INDEX: 29.39 KG/M2 | SYSTOLIC BLOOD PRESSURE: 138 MMHG | DIASTOLIC BLOOD PRESSURE: 74 MMHG | HEIGHT: 72 IN | WEIGHT: 217 LBS

## 2023-07-11 DIAGNOSIS — I48.0 PAROXYSMAL ATRIAL FIBRILLATION (HCC): ICD-10-CM

## 2023-07-11 DIAGNOSIS — Z98.890 S/P ABLATION OF ATRIAL FIBRILLATION: ICD-10-CM

## 2023-07-11 DIAGNOSIS — R00.2 PALPITATIONS: ICD-10-CM

## 2023-07-11 DIAGNOSIS — I48.92 ATRIAL FLUTTER, UNSPECIFIED TYPE (HCC): Primary | ICD-10-CM

## 2023-07-11 DIAGNOSIS — R06.89 DYSPNEA AND RESPIRATORY ABNORMALITY: ICD-10-CM

## 2023-07-11 DIAGNOSIS — R06.00 DYSPNEA AND RESPIRATORY ABNORMALITY: ICD-10-CM

## 2023-07-11 DIAGNOSIS — Z79.01 ANTICOAGULATED: ICD-10-CM

## 2023-07-11 DIAGNOSIS — Z86.79 S/P ABLATION OF ATRIAL FIBRILLATION: ICD-10-CM

## 2023-07-11 DIAGNOSIS — I10 ESSENTIAL HYPERTENSION: ICD-10-CM

## 2023-07-11 PROCEDURE — 99214 OFFICE O/P EST MOD 30 MIN: CPT | Performed by: INTERNAL MEDICINE

## 2023-07-12 PROBLEM — R06.00 DYSPNEA AND RESPIRATORY ABNORMALITY: Status: ACTIVE | Noted: 2023-07-12

## 2023-07-12 PROBLEM — R06.89 DYSPNEA AND RESPIRATORY ABNORMALITY: Status: ACTIVE | Noted: 2023-07-12

## 2023-07-12 PROBLEM — I48.92 ATRIAL FLUTTER (HCC): Status: ACTIVE | Noted: 2023-07-12

## 2023-07-12 PROCEDURE — 93000 ELECTROCARDIOGRAM COMPLETE: CPT | Performed by: INTERNAL MEDICINE

## 2023-07-12 NOTE — PROGRESS NOTES
EPS Progress Note - Vlad Nelson 60 y.o. male MRN: [id number]           ASSESSMENT:  1. Atrial flutter, unspecified type ([address])  POCT ECG    Echo complete w/ contrast if indicated    Stress test only, exercise      2. Dyspnea and respiratory abnormality  Echo complete w/ contrast if indicated    Stress test only, exercise      3. S/P ablation of atrial fibrillation        4. Palpitations        5. Anticoagulated with Eliquis        6. Paroxysmal atrial fibrillation and flutter ([address])        7. Essential hypertension                PLAN:  1.  New onset dyspnea with occasional chest heaviness/tightness. Exercise stress test and echo ordered. 2. PAF and atrial flutter none since ablation    3. HTN well controlled    4. Anticoagulation - reasonable to stay on eliquis    5. Palpitations - stable. Experiencing none    HPI:   Interim history he is noticing more shortness of breath with exertion. Particularly on hills. Things that would not cause these symptoms 6 months ago are not causing him to have dyspnea. No afib since the ablation            ROS   Positive for dyspnea with exertion with some chest tightness relieved with rest, no palpitations, no pre-syncope, all other 12 point ROS negative    Objective:     Vitals: Blood pressure 138/74, height 6' (1.829 m), weight 98.4 kg (217 lb). , Body mass index is 29.43 kg/m². ,        Physical Exam:    GEN: Vlad Nelson appears well, alert and oriented x 3, pleasant and cooperative   HEENT: pupils equal, round, and reactive to light; extraocular muscles intact  NECK: supple, no carotid bruits   HEART: regular rhythm, normal S1 and S2, no murmurs, clicks, gallops or rubs   LUNGS: clear to auscultation bilaterally; no wheezes, rales, or rhonchi   ABDOMEN: normal bowel sounds, soft, no tenderness, no distention  EXTREMITIES: peripheral pulses normal; no clubbing, cyanosis, or edema  NEURO: no focal findings   SKIN: normal without suspicious lesions on exposed skin    Medications:      Current Outpatient Medications:   •  apixaban (Eliquis) 5 mg, Take 1 tablet (5 mg total) by mouth 2 (two) times a day, Disp: 180 tablet, Rfl: 0  •  atorvastatin (LIPITOR) 10 mg tablet, Take 1 tablet (10 mg total) by mouth daily, Disp: 90 tablet, Rfl: 0  •  azelastine (ASTELIN) 0.1 % nasal spray, 1 spray into each nostril 2 (two) times a day Use in each nostril as directed, Disp: , Rfl:   •  diltiazem (CARDIZEM CD) 180 mg 24 hr capsule, Take 1 capsule (180 mg total) by mouth daily, Disp: 90 capsule, Rfl: 0  •  losartan (COZAAR) 100 MG tablet, Take 1 tablet (100 mg total) by mouth daily, Disp: 90 tablet, Rfl: 0     Family History   Problem Relation Age of Onset   • Other Mother         Sarcoma   • Hypertension Father      Social History     Socioeconomic History   • Marital status: Single     Spouse name: Not on file   • Number of children: Not on file   • Years of education: Not on file   • Highest education level: Not on file   Occupational History   • Occupation: [employer], [employer]     Employer: [employer]   Tobacco Use   • Smoking status: Former     Packs/day: 1.00     Years: 15.00     Total pack years: 15.00     Types: Cigarettes     Start date:      Quit date: 2014     Years since quittin.6   • Smokeless tobacco: Never   Vaping Use   • Vaping Use: Never used   Substance and Sexual Activity   • Alcohol use:  Yes     Alcohol/week: 7.0 standard drinks of alcohol     Types: 7 Glasses of wine per week     Comment: 1 glass of wine daily   • Drug use: No   • Sexual activity: Not on file   Other Topics Concern   • Not on file   Social History Narrative    Recreational activities:  Walking     Social Determinants of Health     Financial Resource Strain: Not on file   Food Insecurity: Not on file   Transportation Needs: Not on file   Physical Activity: Not on file   Stress: Not on file   Social Connections: Not on file   Intimate Partner Violence: Not on file   Housing Stability: Not on file     Social History     Tobacco Use   Smoking Status Former   • Packs/day: 1.00   • Years: 15.00   • Total pack years: 15.00   • Types: Cigarettes   • Start date: 5   • Quit date: 2014   • Years since quittin.6   Smokeless Tobacco Never     Social History     Substance and Sexual Activity   Alcohol Use Yes   • Alcohol/week: 7.0 standard drinks of alcohol   • Types: 7 Glasses of wine per week    Comment: 1 glass of wine daily       Labs & Results:  Below is the patient's most recent value for Albumin, ALT, AST, BUN, Calcium, Chloride, Cholesterol, CO2, Creatinine, GFR, Glucose, HDL, Hematocrit, Hemoglobin, Hemoglobin A1C, LDL, Magnesium, Phosphorus, Platelets, Potassium, PSA, Sodium, Triglycerides, and WBC. Lab Results   Component Value Date    ALT 47 2023    AST 26 2023    BUN 19 2023    CALCIUM 9.0 2023     (H) 2023    CHOL 154 2015    CO2 28 2023    CREATININE 1.04 2023    HDL 61 2023    HCT 43.5 2023    HGB 15.4 2023    HGBA1C 5.4 2023    MG 1.9 2019     2023    K 4.6 2023    PSA 1.01 2023     2015    TRIG 75 2023    WBC 10.10 2023     Note: for a comprehensive list of the patient's lab results, access the Results Review activity.             Cardiac testing:   Results for orders placed during the hospital encounter of 19    Echo complete with contrast if indicated    Narrative  [address] [address]  [phone]    Transthoracic Echocardiogram  2D, M-mode, Doppler, and Color Doppler    Study date:  2019    Patient: Ciera Rodriguez  MR number: [id number]  Account number: [de-identified]  : 1960  Age: 60 years  Gender: Male  Status: Outpatient  Location: [address] [place] [address] [facility]  Height: 73 in  Weight: 202 lb  BP: 139/ 79 mmHg    Indications: Atrial flutter    Diagnoses: I48.1 - Atrial flutter    Sonographer:  Zenovia Halsted, RCS  Interpreting Physician:  Kvng Davidson DO  Primary Physician:  Antonia Morris. Mc Kay DO  Referring Physician:  Dave Townsend DO  Group:  [facility]  cc:  Mk Giron DO  Cardiology Fellow:  Rivka Beltran MD    SUMMARY    LEFT VENTRICLE:  Systolic function was normal. Ejection fraction was estimated to be 55 %. There were no regional wall motion abnormalities. RIGHT VENTRICLE:  The size was normal.  Systolic function was normal.    HISTORY: PRIOR HISTORY: Hypertension    PROCEDURE: The study was performed in the [address] [facility] [place]. This was a routine study. The transthoracic approach was used. The study included complete 2D imaging, M-mode, complete spectral Doppler, and color Doppler. The  heart rate was 68 bpm, at the start of the study. Images were obtained from the parasternal, apical, subcostal, and suprasternal notch acoustic windows. Echocardiographic views were limited due to poor acoustic window availability. Image  quality was adequate. LEFT VENTRICLE: Size was normal. Systolic function was normal. Ejection fraction was estimated to be 55 %. There were no regional wall motion abnormalities. Wall thickness was normal. DOPPLER: Left ventricular diastolic function parameters  were normal for the patient's age. RIGHT VENTRICLE: The size was normal. Systolic function was normal. Wall thickness was normal.    LEFT ATRIUM: Size was normal.    RIGHT ATRIUM: Size was normal.    MITRAL VALVE: Valve structure was normal. There was normal leaflet separation. DOPPLER: The transmitral velocity was within the normal range. There was no evidence for stenosis. There was trace regurgitation. AORTIC VALVE: The valve was trileaflet. Leaflets exhibited mildly increased thickness, mild calcification, and normal cuspal separation. DOPPLER: Transaortic velocity was within the normal range.  There was no evidence for stenosis. There  was no significant regurgitation. TRICUSPID VALVE: The valve structure was normal. There was normal leaflet separation. DOPPLER: The transtricuspid velocity was within the normal range. There was no evidence for stenosis. There was no significant regurgitation. PULMONIC VALVE: Leaflets exhibited normal thickness, no calcification, and normal cuspal separation. DOPPLER: The transpulmonic velocity was within the normal range. There was no significant regurgitation. PERICARDIUM: There was no pericardial effusion. AORTA: The root exhibited normal size. SYSTEMIC VEINS: IVC: The inferior vena cava was normal in size. SYSTEM MEASUREMENT TABLES    2D  %FS: 30.2 %  Ao Diam: 3.29 cm  EDV(Teich): 120.65 ml  EF(Cube): 65.99 %  EF(Teich): 57.24 %  ESV(Cube): 43.64 ml  ESV(Teich): 51.59 ml  IVSd: 0.88 cm  LA Area: 17.26 cm2  LA Diam: 3.48 cm  LVEDV MOD A4C: 109.23 ml  LVEF MOD A4C: 49.2 %  LVESV MOD A4C: 55.5 ml  LVIDd: 5.04 cm  LVIDs: 3.52 cm  LVLd A4C: 7.81 cm  LVLs A4C: 6.85 cm  LVPWd: 0.97 cm  RA Area: 13.92 cm2  RV Diam.: 3.75 cm  SV MOD A4C: 53.74 ml  SV(Cube): 84.66 ml  SV(Teich): 69.06 ml    CW  TR Vmax: 2.18 m/s  TR maxP.08 mmHg    MM  TAPSE: 1.98 cm    PW  E': 0.06 m/s  E/E': 8.6  MV A [place]: 0.51 m/s  MV [place]: 1.8 m/s2  MV DecT: 281.82 ms  MV E [place]: 0.51 m/s  MV E/A Ratio: 1    [employer] Accredited Echocardiography Laboratory    Prepared and electronically signed by    Forest Short DO  Signed 2019 10:03:56    No results found for this or any previous visit. No results found for this or any previous visit. No results found for this or any previous visit.

## 2023-07-20 ENCOUNTER — OFFICE VISIT (OUTPATIENT)
Dept: PODIATRY | Facility: CLINIC | Age: 63
End: 2023-07-20

## 2023-07-20 VITALS
BODY MASS INDEX: 29.39 KG/M2 | WEIGHT: 217 LBS | HEIGHT: 72 IN | DIASTOLIC BLOOD PRESSURE: 97 MMHG | RESPIRATION RATE: 18 BRPM | SYSTOLIC BLOOD PRESSURE: 150 MMHG | HEART RATE: 76 BPM

## 2023-07-20 DIAGNOSIS — M20.11 HALLUX VALGUS OF RIGHT FOOT: Primary | ICD-10-CM

## 2023-07-20 DIAGNOSIS — M79.671 RIGHT FOOT PAIN: ICD-10-CM

## 2023-07-20 DIAGNOSIS — M20.21 HALLUX RIGIDUS OF RIGHT FOOT: ICD-10-CM

## 2023-07-20 DIAGNOSIS — M20.41 ACQUIRED HAMMER TOE OF RIGHT FOOT: ICD-10-CM

## 2023-07-20 PROCEDURE — 99024 POSTOP FOLLOW-UP VISIT: CPT | Performed by: PODIATRIST

## 2023-07-20 RX ORDER — CHLORHEXIDINE GLUCONATE 0.12 MG/ML
15 RINSE ORAL ONCE
OUTPATIENT
Start: 2023-07-20 | End: 2023-07-20

## 2023-07-20 NOTE — PROGRESS NOTES
Assessment/Plan:    I personally reviewed x-rays of the right foot taken today. They reveal degenerative changes at the first metatarsophalangeal joint with osteophytes present. Right great toe is malaligned medial laterally. Mallet toe deformity noted right second toe. Explained to patient that he has orthopedic disorders both at the right great toe and DIPJ of the right second toe. He has significant arthritis at the great toe joint and a malalignment of the right hallux. He has a mallet toe deformity of the right second toe. Discussed conservative and surgical treatment options. Dispensed Silipos pads for the second toe. Patient is desirous of surgical intervention. Recommended cheilectomy first MPJ right foot. Also Akin osteotomy with staple fixation right hallux. Lastly DIPJ arthroplasty right second toe. Patient desires surgery as soon as possible. Procedure was explained including pre and postoperative course, risk and possible complications. The consent form was signed. Patient will need to miss 7 weeks of work. He will also need to be off Eliquis for 5 days. No problem-specific Assessment & Plan notes found for this encounter. Diagnoses and all orders for this visit:    Hallux valgus of right foot  -     X-ray foot right 3+ views; Future    Right foot pain          Subjective:      Patient ID: Kortney Powell is a 60 y.o. male. HPI     Patient, a 57-year-old male presents with pain due to a malaligned right great toe. For years, the patient noted that the right great toe is turned toward the second toe. Because of this, the patient has pain and inflammation at the right second toe. He has been trying to pad the toe as best that he can to relieve pressure. Patient also notes the presence of a bump on the top of the right great toe joint. Patient states that he has had problems with this foot for months.   Patient is employed as a  for [employer] and has pain when driving. Past medical history is positive for atrial fibrillation. Patient takes Eliquis on a regular basis. The following portions of the patient's history were reviewed and updated as appropriate: allergies, current medications, past family history, past medical history, past social history, past surgical history and problem list.    Review of Systems   Cardiovascular:        Atrial fibrillation   Musculoskeletal: Positive for gait problem. Hematological: Bruises/bleeds easily. Objective:      /97   Pulse 76   Resp 18   Ht 6' (1.829 m)   Wt 98.4 kg (217 lb)   BMI 29.43 kg/m²          Physical Exam  Constitutional:       Appearance: Normal appearance. Cardiovascular:      Pulses: Normal pulses. Musculoskeletal:         General: Tenderness and deformity present. Comments: Dorsal exostosis noted first metatarsal phalangeal joint right foot. Right great toe is deviated laterally and impinges on the second toe. DIPJ is inflamed at the medial aspect right second toe. Range of motion first MPJ right foot approaches 40 to 50 degrees. Skin:     Findings: Lesion present. Comments: Superficial but inflamed soft corn medial aspect DIPJ right second toe. Neurological:      General: No focal deficit present. Mental Status: He is oriented to person, place, and time.

## 2023-08-15 ENCOUNTER — HOSPITAL ENCOUNTER (OUTPATIENT)
Dept: NON INVASIVE DIAGNOSTICS | Facility: HOSPITAL | Age: 63
Discharge: HOME | End: 2023-08-15
Attending: INTERNAL MEDICINE
Payer: COMMERCIAL

## 2023-08-15 VITALS
BODY MASS INDEX: 29.39 KG/M2 | DIASTOLIC BLOOD PRESSURE: 97 MMHG | HEIGHT: 72 IN | WEIGHT: 217 LBS | HEART RATE: 80 BPM | SYSTOLIC BLOOD PRESSURE: 150 MMHG

## 2023-08-15 DIAGNOSIS — R06.89 DYSPNEA AND RESPIRATORY ABNORMALITY: ICD-10-CM

## 2023-08-15 DIAGNOSIS — R06.00 DYSPNEA AND RESPIRATORY ABNORMALITY: ICD-10-CM

## 2023-08-15 DIAGNOSIS — I48.92 ATRIAL FLUTTER, UNSPECIFIED TYPE (HCC): ICD-10-CM

## 2023-08-15 LAB
AORTIC ROOT: 3.1 CM
APICAL FOUR CHAMBER EJECTION FRACTION: 64 %
ARRHY DURING EX: NORMAL
ASCENDING AORTA: 3.2 CM
CHEST PAIN STATEMENT: NORMAL
E WAVE DECELERATION TIME: 198 MS
FRACTIONAL SHORTENING: 32 % (ref 28–44)
INTERVENTRICULAR SEPTUM IN DIASTOLE (PARASTERNAL SHORT AXIS VIEW): 1 CM
INTERVENTRICULAR SEPTUM: 1 CM (ref 0.6–1.1)
LAAS-AP2: 23.1 CM2
LAAS-AP4: 21.2 CM2
LEFT ATRIUM SIZE: 4.2 CM
LEFT ATRIUM VOLUME (MOD BIPLANE): 64 ML
LEFT INTERNAL DIMENSION IN SYSTOLE: 3.2 CM (ref 2.1–4)
LEFT VENTRICULAR INTERNAL DIMENSION IN DIASTOLE: 4.7 CM (ref 3.5–6)
LEFT VENTRICULAR POSTERIOR WALL IN END DIASTOLE: 1 CM
LEFT VENTRICULAR STROKE VOLUME: 58 ML
LVSV (TEICH): 58 ML
MAX DIASTOLIC BP: 70 MMHG
MAX HEART RATE: 141 BPM
MAX HR PERCENT: 89 %
MAX HR: 141 BPM
MAX PREDICTED HEART RATE: 157 BPM
MAX. SYSTOLIC BP: 210 MMHG
MV E'TISSUE VEL-SEP: 9 CM/S
MV PEAK A VEL: 0.62 M/S
MV PEAK E VEL: 67 CM/S
MV STENOSIS PRESSURE HALF TIME: 57 MS
MV VALVE AREA P 1/2 METHOD: 3.86 CM2
PROTOCOL NAME: NORMAL
RA PRESSURE ESTIMATED: 5 MMHG
RATE PRESSURE PRODUCT: NORMAL
RIGHT ATRIUM AREA SYSTOLE A4C: 17.6 CM2
RIGHT VENTRICLE ID DIMENSION: 3.9 CM
RV PSP: 32 MMHG
SL CV LEFT ATRIUM LENGTH A2C: 6.2 CM
SL CV LV EF: 65
SL CV PED ECHO LEFT VENTRICLE DIASTOLIC VOLUME (MOD BIPLANE) 2D: 100 ML
SL CV PED ECHO LEFT VENTRICLE SYSTOLIC VOLUME (MOD BIPLANE) 2D: 42 ML
SL CV STRESS RECOVERY BP: NORMAL MMHG
SL CV STRESS RECOVERY HR: 97 BPM
SL CV STRESS RECOVERY O2 SAT: 98 %
SL CV STRESS STAGE REACHED: 3
STRESS ANGINA INDEX: 0
STRESS BASELINE BP: NORMAL MMHG
STRESS BASELINE HR: 80 BPM
STRESS DUKE TREADMILL SCORE: 8
STRESS O2 SAT REST: 96 %
STRESS PEAK HR: 141 BPM
STRESS POST ESTIMATED WORKLOAD: 10.1 METS
STRESS POST EXERCISE DUR MIN: 8 MIN
STRESS POST O2 SAT PEAK: 97 %
STRESS POST PEAK BP: 210 MMHG
STRESS ST DEPRESSION: 0.5 MM
TARGET HR FORMULA: NORMAL
TEST INDICATION: NORMAL
TIME IN EXERCISE PHASE: NORMAL
TR MAX PG: 27 MMHG
TR PEAK VELOCITY: 2.6 M/S
TRICUSPID ANNULAR PLANE SYSTOLIC EXCURSION: 2.4 CM
TRICUSPID VALVE PEAK REGURGITATION VELOCITY: 2.94 M/S

## 2023-08-15 PROCEDURE — 93306 TTE W/DOPPLER COMPLETE: CPT

## 2023-08-15 PROCEDURE — 93018 CV STRESS TEST I&R ONLY: CPT | Performed by: INTERNAL MEDICINE

## 2023-08-15 PROCEDURE — 93306 TTE W/DOPPLER COMPLETE: CPT | Performed by: STUDENT IN AN ORGANIZED HEALTH CARE EDUCATION/TRAINING PROGRAM

## 2023-08-15 PROCEDURE — 93017 CV STRESS TEST TRACING ONLY: CPT

## 2023-08-15 PROCEDURE — 93016 CV STRESS TEST SUPVJ ONLY: CPT | Performed by: INTERNAL MEDICINE

## 2023-09-09 ENCOUNTER — APPOINTMENT (OUTPATIENT)
Dept: LAB | Facility: MEDICAL CENTER | Age: 63
End: 2023-09-09
Payer: COMMERCIAL

## 2023-09-09 DIAGNOSIS — Z01.818 PRE-OP TESTING: ICD-10-CM

## 2023-09-09 LAB
ANION GAP SERPL CALCULATED.3IONS-SCNC: 7 MMOL/L
BASOPHILS # BLD AUTO: 0.04 THOUSANDS/ÂΜL (ref 0–0.1)
BASOPHILS NFR BLD AUTO: 1 % (ref 0–1)
BUN SERPL-MCNC: 21 MG/DL (ref 5–25)
CALCIUM SERPL-MCNC: 9.3 MG/DL (ref 8.4–10.2)
CHLORIDE SERPL-SCNC: 106 MMOL/L (ref 96–108)
CO2 SERPL-SCNC: 27 MMOL/L (ref 21–32)
CREAT SERPL-MCNC: 1.1 MG/DL (ref 0.6–1.3)
EOSINOPHIL # BLD AUTO: 0.18 THOUSAND/ÂΜL (ref 0–0.61)
EOSINOPHIL NFR BLD AUTO: 2 % (ref 0–6)
ERYTHROCYTE [DISTWIDTH] IN BLOOD BY AUTOMATED COUNT: 13.1 % (ref 11.6–15.1)
GFR SERPL CREATININE-BSD FRML MDRD: 71 ML/MIN/1.73SQ M
GLUCOSE P FAST SERPL-MCNC: 85 MG/DL (ref 65–99)
HCT VFR BLD AUTO: 42.9 % (ref 36.5–49.3)
HGB BLD-MCNC: 14.9 G/DL (ref 12–17)
IMM GRANULOCYTES # BLD AUTO: 0.02 THOUSAND/UL (ref 0–0.2)
IMM GRANULOCYTES NFR BLD AUTO: 0 % (ref 0–2)
LYMPHOCYTES # BLD AUTO: 3.55 THOUSANDS/ÂΜL (ref 0.6–4.47)
LYMPHOCYTES NFR BLD AUTO: 46 % (ref 14–44)
MCH RBC QN AUTO: 33.3 PG (ref 26.8–34.3)
MCHC RBC AUTO-ENTMCNC: 34.7 G/DL (ref 31.4–37.4)
MCV RBC AUTO: 96 FL (ref 82–98)
MONOCYTES # BLD AUTO: 0.61 THOUSAND/ÂΜL (ref 0.17–1.22)
MONOCYTES NFR BLD AUTO: 8 % (ref 4–12)
NEUTROPHILS # BLD AUTO: 3.33 THOUSANDS/ÂΜL (ref 1.85–7.62)
NEUTS SEG NFR BLD AUTO: 43 % (ref 43–75)
NRBC BLD AUTO-RTO: 0 /100 WBCS
PLATELET # BLD AUTO: 246 THOUSANDS/UL (ref 149–390)
PMV BLD AUTO: 9.4 FL (ref 8.9–12.7)
POTASSIUM SERPL-SCNC: 4.2 MMOL/L (ref 3.5–5.3)
RBC # BLD AUTO: 4.47 MILLION/UL (ref 3.88–5.62)
SODIUM SERPL-SCNC: 140 MMOL/L (ref 135–147)
WBC # BLD AUTO: 7.73 THOUSAND/UL (ref 4.31–10.16)

## 2023-09-09 PROCEDURE — 36415 COLL VENOUS BLD VENIPUNCTURE: CPT

## 2023-09-09 PROCEDURE — 85025 COMPLETE CBC W/AUTO DIFF WBC: CPT

## 2023-09-09 PROCEDURE — 80048 BASIC METABOLIC PNL TOTAL CA: CPT

## 2023-09-16 DIAGNOSIS — I48.92 ATRIAL FLUTTER, UNSPECIFIED TYPE (HCC): ICD-10-CM

## 2023-09-16 DIAGNOSIS — E78.00 HYPERCHOLESTEROLEMIA: ICD-10-CM

## 2023-09-16 DIAGNOSIS — I10 ESSENTIAL HYPERTENSION: ICD-10-CM

## 2023-09-18 ENCOUNTER — TELEPHONE (OUTPATIENT)
Dept: OBGYN CLINIC | Facility: CLINIC | Age: 63
End: 2023-09-18

## 2023-09-18 RX ORDER — ATORVASTATIN CALCIUM 10 MG/1
10 TABLET, FILM COATED ORAL DAILY
Qty: 90 TABLET | Refills: 0 | Status: SHIPPED | OUTPATIENT
Start: 2023-09-18

## 2023-09-18 RX ORDER — DILTIAZEM HYDROCHLORIDE 180 MG/1
180 CAPSULE, COATED, EXTENDED RELEASE ORAL DAILY
Qty: 90 CAPSULE | Refills: 0 | Status: SHIPPED | OUTPATIENT
Start: 2023-09-18

## 2023-09-18 RX ORDER — LOSARTAN POTASSIUM 100 MG/1
100 TABLET ORAL DAILY
Qty: 90 TABLET | Refills: 0 | Status: SHIPPED | OUTPATIENT
Start: 2023-09-18

## 2023-09-18 NOTE — TELEPHONE ENCOUNTER
Patient called checking on status of disability/FMLA forms that were sent to me last week. Told patient that I received them last Monday and sent the forms to the dedicated Team on Tues. Please allow 7-14 days. Patient understood.

## 2023-09-21 ENCOUNTER — CONSULT (OUTPATIENT)
Dept: FAMILY MEDICINE CLINIC | Facility: CLINIC | Age: 63
End: 2023-09-21
Payer: COMMERCIAL

## 2023-09-21 VITALS
DIASTOLIC BLOOD PRESSURE: 80 MMHG | OXYGEN SATURATION: 96 % | HEART RATE: 87 BPM | SYSTOLIC BLOOD PRESSURE: 132 MMHG | WEIGHT: 216 LBS | BODY MASS INDEX: 29.29 KG/M2

## 2023-09-21 DIAGNOSIS — Z79.01 ANTICOAGULATED: ICD-10-CM

## 2023-09-21 DIAGNOSIS — I10 ESSENTIAL HYPERTENSION: ICD-10-CM

## 2023-09-21 DIAGNOSIS — Z01.818 PRE-OP EXAM: Primary | ICD-10-CM

## 2023-09-21 DIAGNOSIS — I48.92 ATRIAL FLUTTER, UNSPECIFIED TYPE (HCC): ICD-10-CM

## 2023-09-21 PROCEDURE — 93000 ELECTROCARDIOGRAM COMPLETE: CPT | Performed by: INTERNAL MEDICINE

## 2023-09-21 PROCEDURE — 99214 OFFICE O/P EST MOD 30 MIN: CPT | Performed by: INTERNAL MEDICINE

## 2023-09-21 NOTE — H&P (VIEW-ONLY)
Assessment/Plan:    Pre-op exam  Patient came today for preop clearance for his hammertoe repair under general anesthesia. He is very physically active, he can perform more than 4 METS of physical exertion with no chest pain, shortness of breath or palpitations. He has history of atrial fibrillation, today in the office his EKG revealed normal sinus rhythm. His blood pressure is very acceptable. His recent blood work revealed normal electrolytes, kidney function and cell count. We will hold his Eliquis for 48 hours before his surgical intervention, he will restart ASAP after the surgery but it will be further guided by the surgical team depending on hemostasis. He will continue rest of his medications as is. According to revised cardiac risk index calculator he is a low risk of MI, cardiac arrest or death during non high risk surgery. He may proceed with surgical intervention as scheduled. Diagnoses and all orders for this visit:    Pre-op exam  -     POCT ECG    Essential hypertension    Atrial flutter, unspecified type ([address])    Anticoagulated with Eliquis          Subjective:      Patient ID: Kaley Franco is a 60 y.o. male. Patient came today for preop clearance for his upcoming hammertoe repair under general anesthesia. The following portions of the patient's history were reviewed and updated as appropriate: allergies, current medications, past family history, past medical history, past social history, past surgical history, and problem list.    Review of Systems   Constitutional: Negative for chills, fatigue and fever. Respiratory: Negative for cough, chest tightness and shortness of breath. Cardiovascular: Negative for chest pain, palpitations and leg swelling. Gastrointestinal: Negative for abdominal distention, abdominal pain, blood in stool, constipation, diarrhea and nausea. Genitourinary: Negative for difficulty urinating and dysuria. Skin: Negative for rash. Objective:      /80 (BP Location: Left arm, Patient Position: Sitting, Cuff Size: Large)   Pulse 87   Wt 98 kg (216 lb)   SpO2 96%   BMI 29.29 kg/m²     Allergies   Allergen Reactions   • Dust Mite Extract    • Lisinopril Edema     Annotation - [id number]: lip swelling x 2 2015   • Short Ragweed Pollen Ext           Current Outpatient Medications:   •  apixaban (Eliquis) 5 mg, Take 1 tablet (5 mg total) by mouth 2 (two) times a day, Disp: 180 tablet, Rfl: 0  •  atorvastatin (LIPITOR) 10 mg tablet, Take 1 tablet (10 mg total) by mouth daily, Disp: 90 tablet, Rfl: 0  •  azelastine (ASTELIN) 0.1 % nasal spray, 1 spray into each nostril 2 (two) times a day Use in each nostril as directed, Disp: , Rfl:   •  diltiazem (CARDIZEM CD) 180 mg 24 hr capsule, Take 1 capsule (180 mg total) by mouth daily, Disp: 90 capsule, Rfl: 0  •  losartan (COZAAR) 100 MG tablet, Take 1 tablet (100 mg total) by mouth daily, Disp: 90 tablet, Rfl: 0     Patient Instructions   Please hold your Eliquis 48 hours before your surgical procedure. Physical Exam  Vitals reviewed. Constitutional:       General: He is not in acute distress. Appearance: He is not toxic-appearing. HENT:      Head: Normocephalic. Cardiovascular:      Rate and Rhythm: Normal rate. Pulses: Normal pulses. Heart sounds: No murmur heard. No gallop. Pulmonary:      Effort: Pulmonary effort is normal. No respiratory distress. Breath sounds: No wheezing or rales. Skin:     General: Skin is warm. Neurological:      General: No focal deficit present. Mental Status: He is alert.    Psychiatric:         Mood and Affect: Mood normal.         Behavior: Behavior normal.

## 2023-09-21 NOTE — ASSESSMENT & PLAN NOTE
Patient came today for preop clearance for his hammertoe repair under general anesthesia. He is very physically active, he can perform more than 4 METS of physical exertion with no chest pain, shortness of breath or palpitations. He has history of atrial fibrillation, today in the office his EKG revealed normal sinus rhythm. His blood pressure is very acceptable. His recent blood work revealed normal electrolytes, kidney function and cell count. We will hold his Eliquis for 48 hours before his surgical intervention, he will restart ASAP after the surgery but it will be further guided by the surgical team depending on hemostasis. He will continue rest of his medications as is. According to revised cardiac risk index calculator he is a low risk of MI, cardiac arrest or death during non high risk surgery. He may proceed with surgical intervention as scheduled.

## 2023-09-21 NOTE — PROGRESS NOTES
Assessment/Plan:    Pre-op exam  Patient came today for preop clearance for his hammertoe repair under general anesthesia. He is very physically active, he can perform more than 4 METS of physical exertion with no chest pain, shortness of breath or palpitations. He has history of atrial fibrillation, today in the office his EKG revealed normal sinus rhythm. His blood pressure is very acceptable. His recent blood work revealed normal electrolytes, kidney function and cell count. We will hold his Eliquis for 48 hours before his surgical intervention, he will restart ASAP after the surgery but it will be further guided by the surgical team depending on hemostasis. He will continue rest of his medications as is. According to revised cardiac risk index calculator he is a low risk of MI, cardiac arrest or death during non high risk surgery. He may proceed with surgical intervention as scheduled. Diagnoses and all orders for this visit:    Pre-op exam  -     POCT ECG    Essential hypertension    Atrial flutter, unspecified type ([address])    Anticoagulated with Eliquis          Subjective:      Patient ID: Twyla Salcedo is a 60 y.o. male. Patient came today for preop clearance for his upcoming hammertoe repair under general anesthesia. The following portions of the patient's history were reviewed and updated as appropriate: allergies, current medications, past family history, past medical history, past social history, past surgical history, and problem list.    Review of Systems   Constitutional: Negative for chills, fatigue and fever. Respiratory: Negative for cough, chest tightness and shortness of breath. Cardiovascular: Negative for chest pain, palpitations and leg swelling. Gastrointestinal: Negative for abdominal distention, abdominal pain, blood in stool, constipation, diarrhea and nausea. Genitourinary: Negative for difficulty urinating and dysuria. Skin: Negative for rash. Objective:      /80 (BP Location: Left arm, Patient Position: Sitting, Cuff Size: Large)   Pulse 87   Wt 98 kg (216 lb)   SpO2 96%   BMI 29.29 kg/m²     Allergies   Allergen Reactions   • Dust Mite Extract    • Lisinopril Edema     Annotation - [id number]: lip swelling x 2 2015   • Short Ragweed Pollen Ext           Current Outpatient Medications:   •  apixaban (Eliquis) 5 mg, Take 1 tablet (5 mg total) by mouth 2 (two) times a day, Disp: 180 tablet, Rfl: 0  •  atorvastatin (LIPITOR) 10 mg tablet, Take 1 tablet (10 mg total) by mouth daily, Disp: 90 tablet, Rfl: 0  •  azelastine (ASTELIN) 0.1 % nasal spray, 1 spray into each nostril 2 (two) times a day Use in each nostril as directed, Disp: , Rfl:   •  diltiazem (CARDIZEM CD) 180 mg 24 hr capsule, Take 1 capsule (180 mg total) by mouth daily, Disp: 90 capsule, Rfl: 0  •  losartan (COZAAR) 100 MG tablet, Take 1 tablet (100 mg total) by mouth daily, Disp: 90 tablet, Rfl: 0     Patient Instructions   Please hold your Eliquis 48 hours before your surgical procedure. Physical Exam  Vitals reviewed. Constitutional:       General: He is not in acute distress. Appearance: He is not toxic-appearing. HENT:      Head: Normocephalic. Cardiovascular:      Rate and Rhythm: Normal rate. Pulses: Normal pulses. Heart sounds: No murmur heard. No gallop. Pulmonary:      Effort: Pulmonary effort is normal. No respiratory distress. Breath sounds: No wheezing or rales. Skin:     General: Skin is warm. Neurological:      General: No focal deficit present. Mental Status: He is alert.    Psychiatric:         Mood and Affect: Mood normal.         Behavior: Behavior normal.

## 2023-10-04 NOTE — PRE-PROCEDURE INSTRUCTIONS
Pre-Surgery Instructions:   Medication Instructions   • apixaban (Eliquis) 5 mg Instructions provided by MD   • atorvastatin (LIPITOR) 10 mg tablet Take day of surgery. • azelastine (ASTELIN) 0.1 % nasal spray Uses PRN- OK to take day of surgery   • diltiazem (CARDIZEM CD) 180 mg 24 hr capsule Take day of surgery. • losartan (COZAAR) 100 MG tablet Take night before surgery    Medication instructions for day surgery reviewed. Please use only a sip of water to take your instructed medications. Avoid all over the counter vitamins, supplements and NSAIDS for one week prior to surgery per anesthesia guidelines. Tylenol is ok to take as needed. You will receive a call one business day prior to surgery with an arrival time and hospital directions. If your surgery is scheduled on a Monday, the hospital will be calling you on the Friday prior to your surgery. If you have not heard from anyone by 8pm, please call the hospital supervisor through the hospital  at [phone]. Hazel Suarez [phone] Do not eat or drink anything after midnight the night before your surgery, including candy, mints, lifesavers, or chewing gum. Do not drink alcohol 24hrs before your surgery. Try not to smoke at least 24hrs before your surgery. Follow the pre surgery showering instructions as listed in the [facility] Surgical Experience Booklet” or otherwise provided by your surgeon's office. Do not shave the surgical area 24 hours before surgery. Do not apply any lotions, creams, including makeup, cologne, deodorant, or perfumes after showering on the day of your surgery. No contact lenses, eye make-up, or artificial eyelashes. Remove nail polish, including gel polish, and any artificial, gel, or acrylic nails if possible. Remove all jewelry including rings and body piercing jewelry. Wear causal clothing that is easy to take on and off. Consider your type of surgery. Keep any valuables, jewelry, piercings at home. Please bring any specially ordered equipment (sling, braces) if indicated. Arrange for a responsible person to drive you to and from the hospital on the day of your surgery. Visitor Guidelines discussed. Call the surgeon's office with any new illnesses, exposures, or additional questions prior to surgery. Please reference your [facility] Surgical Experience Booklet” for additional information to prepare for your upcoming surgery.

## 2023-10-12 ENCOUNTER — ANESTHESIA EVENT (OUTPATIENT)
Dept: PERIOP | Facility: HOSPITAL | Age: 63
End: 2023-10-12
Payer: COMMERCIAL

## 2023-10-13 ENCOUNTER — HOSPITAL ENCOUNTER (OUTPATIENT)
Facility: HOSPITAL | Age: 63
Setting detail: OUTPATIENT SURGERY
Discharge: HOME | End: 2023-10-13
Attending: PODIATRIST | Admitting: PODIATRIST
Payer: COMMERCIAL

## 2023-10-13 ENCOUNTER — ANESTHESIA (OUTPATIENT)
Dept: PERIOP | Facility: HOSPITAL | Age: 63
End: 2023-10-13
Payer: COMMERCIAL

## 2023-10-13 ENCOUNTER — APPOINTMENT (OUTPATIENT)
Dept: RADIOLOGY | Facility: HOSPITAL | Age: 63
End: 2023-10-13
Payer: COMMERCIAL

## 2023-10-13 VITALS
BODY MASS INDEX: 29.62 KG/M2 | OXYGEN SATURATION: 94 % | DIASTOLIC BLOOD PRESSURE: 76 MMHG | TEMPERATURE: 97 F | SYSTOLIC BLOOD PRESSURE: 146 MMHG | WEIGHT: 218.7 LBS | HEART RATE: 82 BPM | HEIGHT: 72 IN | RESPIRATION RATE: 18 BRPM

## 2023-10-13 DIAGNOSIS — G89.18 POST-OPERATIVE PAIN: Primary | ICD-10-CM

## 2023-10-13 PROCEDURE — 99024 POSTOP FOLLOW-UP VISIT: CPT | Performed by: PODIATRIST

## 2023-10-13 PROCEDURE — 28285 REPAIR OF HAMMERTOE: CPT | Performed by: PODIATRIST

## 2023-10-13 PROCEDURE — 28289 CORRJ HALUX RIGDUS W/O IMPLT: CPT | Performed by: PODIATRIST

## 2023-10-13 PROCEDURE — 73630 X-RAY EXAM OF FOOT: CPT

## 2023-10-13 PROCEDURE — C1713 ANCHOR/SCREW BN/BN,TIS/BN: HCPCS | Performed by: PODIATRIST

## 2023-10-13 DEVICE — DYNANITE NITI STAPLE W/INSTRS 9WX7L
Type: IMPLANTABLE DEVICE | Site: FOOT | Status: FUNCTIONAL
Brand: ARTHREX®

## 2023-10-13 RX ORDER — FENTANYL CITRATE 50 UG/ML
INJECTION, SOLUTION INTRAMUSCULAR; INTRAVENOUS AS NEEDED
Status: DISCONTINUED | OUTPATIENT
Start: 2023-10-13 | End: 2023-10-13

## 2023-10-13 RX ORDER — FENTANYL CITRATE/PF 50 MCG/ML
50 SYRINGE (ML) INJECTION
Status: DISCONTINUED | OUTPATIENT
Start: 2023-10-13 | End: 2023-10-13 | Stop reason: HOSPADM

## 2023-10-13 RX ORDER — KETOROLAC TROMETHAMINE 30 MG/ML
INJECTION, SOLUTION INTRAMUSCULAR; INTRAVENOUS AS NEEDED
Status: DISCONTINUED | OUTPATIENT
Start: 2023-10-13 | End: 2023-10-13

## 2023-10-13 RX ORDER — ONDANSETRON 2 MG/ML
INJECTION INTRAMUSCULAR; INTRAVENOUS AS NEEDED
Status: DISCONTINUED | OUTPATIENT
Start: 2023-10-13 | End: 2023-10-13

## 2023-10-13 RX ORDER — EPHEDRINE SULFATE 50 MG/ML
INJECTION INTRAVENOUS AS NEEDED
Status: DISCONTINUED | OUTPATIENT
Start: 2023-10-13 | End: 2023-10-13

## 2023-10-13 RX ORDER — PROMETHAZINE HYDROCHLORIDE 25 MG/ML
6.25 INJECTION, SOLUTION INTRAMUSCULAR; INTRAVENOUS ONCE AS NEEDED
Status: DISCONTINUED | OUTPATIENT
Start: 2023-10-13 | End: 2023-10-13 | Stop reason: HOSPADM

## 2023-10-13 RX ORDER — PROPOFOL 10 MG/ML
INJECTION, EMULSION INTRAVENOUS AS NEEDED
Status: DISCONTINUED | OUTPATIENT
Start: 2023-10-13 | End: 2023-10-13

## 2023-10-13 RX ORDER — OXYCODONE HYDROCHLORIDE AND ACETAMINOPHEN 5; 325 MG/1; MG/1
1 TABLET ORAL EVERY 4 HOURS PRN
Qty: 15 TABLET | Refills: 0 | Status: SHIPPED | OUTPATIENT
Start: 2023-10-13

## 2023-10-13 RX ORDER — HYDROMORPHONE HCL/PF 1 MG/ML
0.5 SYRINGE (ML) INJECTION
Status: DISCONTINUED | OUTPATIENT
Start: 2023-10-13 | End: 2023-10-13 | Stop reason: HOSPADM

## 2023-10-13 RX ORDER — MEPERIDINE HYDROCHLORIDE 25 MG/ML
12.5 INJECTION INTRAMUSCULAR; INTRAVENOUS; SUBCUTANEOUS ONCE AS NEEDED
Status: DISCONTINUED | OUTPATIENT
Start: 2023-10-13 | End: 2023-10-13 | Stop reason: HOSPADM

## 2023-10-13 RX ORDER — CEFAZOLIN SODIUM 2 G/50ML
2000 SOLUTION INTRAVENOUS ONCE
Status: COMPLETED | OUTPATIENT
Start: 2023-10-13 | End: 2023-10-13

## 2023-10-13 RX ORDER — PHENYLEPHRINE HCL IN 0.9% NACL 1 MG/10 ML
SYRINGE (ML) INTRAVENOUS AS NEEDED
Status: DISCONTINUED | OUTPATIENT
Start: 2023-10-13 | End: 2023-10-13

## 2023-10-13 RX ORDER — MIDAZOLAM HYDROCHLORIDE 2 MG/2ML
INJECTION, SOLUTION INTRAMUSCULAR; INTRAVENOUS AS NEEDED
Status: DISCONTINUED | OUTPATIENT
Start: 2023-10-13 | End: 2023-10-13

## 2023-10-13 RX ORDER — SODIUM CHLORIDE 9 MG/ML
125 INJECTION, SOLUTION INTRAVENOUS CONTINUOUS
Status: DISCONTINUED | OUTPATIENT
Start: 2023-10-13 | End: 2023-10-13 | Stop reason: HOSPADM

## 2023-10-13 RX ORDER — BUPIVACAINE HYDROCHLORIDE 5 MG/ML
INJECTION, SOLUTION EPIDURAL; INTRACAUDAL AS NEEDED
Status: DISCONTINUED | OUTPATIENT
Start: 2023-10-13 | End: 2023-10-13 | Stop reason: HOSPADM

## 2023-10-13 RX ORDER — LIDOCAINE HYDROCHLORIDE 20 MG/ML
INJECTION, SOLUTION EPIDURAL; INFILTRATION; INTRACAUDAL; PERINEURAL AS NEEDED
Status: DISCONTINUED | OUTPATIENT
Start: 2023-10-13 | End: 2023-10-13

## 2023-10-13 RX ORDER — ONDANSETRON 2 MG/ML
4 INJECTION INTRAMUSCULAR; INTRAVENOUS ONCE AS NEEDED
Status: DISCONTINUED | OUTPATIENT
Start: 2023-10-13 | End: 2023-10-13 | Stop reason: HOSPADM

## 2023-10-13 RX ORDER — DEXAMETHASONE SODIUM PHOSPHATE 10 MG/ML
INJECTION, SOLUTION INTRAMUSCULAR; INTRAVENOUS AS NEEDED
Status: DISCONTINUED | OUTPATIENT
Start: 2023-10-13 | End: 2023-10-13

## 2023-10-13 RX ORDER — MAGNESIUM HYDROXIDE 1200 MG/15ML
LIQUID ORAL AS NEEDED
Status: DISCONTINUED | OUTPATIENT
Start: 2023-10-13 | End: 2023-10-13 | Stop reason: HOSPADM

## 2023-10-13 RX ORDER — CHLORHEXIDINE GLUCONATE ORAL RINSE 1.2 MG/ML
15 SOLUTION DENTAL ONCE
Status: COMPLETED | OUTPATIENT
Start: 2023-10-13 | End: 2023-10-13

## 2023-10-13 RX ADMIN — MIDAZOLAM 2 MG: 1 INJECTION INTRAMUSCULAR; INTRAVENOUS at 07:25

## 2023-10-13 RX ADMIN — EPHEDRINE SULFATE 10 MG: 50 INJECTION, SOLUTION INTRAVENOUS at 07:38

## 2023-10-13 RX ADMIN — Medication 100 MCG: at 07:50

## 2023-10-13 RX ADMIN — Medication 200 MCG: at 08:23

## 2023-10-13 RX ADMIN — EPHEDRINE SULFATE 10 MG: 50 INJECTION, SOLUTION INTRAVENOUS at 08:08

## 2023-10-13 RX ADMIN — FENTANYL CITRATE 25 MCG: 50 INJECTION, SOLUTION INTRAMUSCULAR; INTRAVENOUS at 08:03

## 2023-10-13 RX ADMIN — CHLORHEXIDINE GLUCONATE 15 ML: 1.2 RINSE ORAL at 06:31

## 2023-10-13 RX ADMIN — SODIUM CHLORIDE: 0.9 INJECTION, SOLUTION INTRAVENOUS at 08:33

## 2023-10-13 RX ADMIN — SODIUM CHLORIDE 125 ML/HR: 0.9 INJECTION, SOLUTION INTRAVENOUS at 06:34

## 2023-10-13 RX ADMIN — ONDANSETRON 4 MG: 2 INJECTION INTRAMUSCULAR; INTRAVENOUS at 07:40

## 2023-10-13 RX ADMIN — FENTANYL CITRATE 50 MCG: 50 INJECTION, SOLUTION INTRAMUSCULAR; INTRAVENOUS at 07:31

## 2023-10-13 RX ADMIN — LIDOCAINE HYDROCHLORIDE 80 MG: 20 INJECTION, SOLUTION EPIDURAL; INFILTRATION; INTRACAUDAL at 07:31

## 2023-10-13 RX ADMIN — Medication 100 MCG: at 08:02

## 2023-10-13 RX ADMIN — PROPOFOL 200 MG: 10 INJECTION, EMULSION INTRAVENOUS at 07:31

## 2023-10-13 RX ADMIN — KETOROLAC TROMETHAMINE 15 MG: 30 INJECTION, SOLUTION INTRAMUSCULAR at 09:10

## 2023-10-13 RX ADMIN — FENTANYL CITRATE 25 MCG: 50 INJECTION, SOLUTION INTRAMUSCULAR; INTRAVENOUS at 08:43

## 2023-10-13 RX ADMIN — CEFAZOLIN SODIUM 2000 MG: 2 SOLUTION INTRAVENOUS at 07:21

## 2023-10-13 RX ADMIN — DEXAMETHASONE SODIUM PHOSPHATE 10 MG: 10 INJECTION INTRAMUSCULAR; INTRAVENOUS at 07:39

## 2023-10-13 RX ADMIN — Medication 100 MCG: at 07:37

## 2023-10-13 NOTE — OP NOTE
OPERATIVE REPORT - Podiatry  PATIENT NAME: Bobby Darnell    :  1960  MRN: [id number]  Pt Location: [facility] OR ROOM 03    SURGERY DATE: 10/13/2023    Surgeon(s) and Role:     * Geovanna Schuster DPM - Primary     * Toy Malcolm DPM - Assisting    Pre-op Diagnosis:  Hallux rigidus of right foot [M20.21]  Hallux valgus of right foot [M20.11]  Acquired hammer toe of right foot [M20.41]    Post-Op Diagnosis Codes:     * Hallux rigidus of right foot [M20.21]     * Hallux valgus of right foot [M20.11]     * Acquired hammer toe of right foot [M20.41]    Procedure(s) (LRB):  CHEILECTOMY (Right)  BUNION MAIRA (Right)  2nd HAMMERTOE REPAIR (Right)    Specimen(s):  * No specimens in log *    Estimated Blood Loss:   10 mL    Drains:  * No LDAs found *    Anesthesia Type:   General/LMA with 15 ml of 0.5% Bupivacaine and 1% Lidocaine in a 1:1 mixture    Hemostasis:  - Atraumatic technique   - Manual compression  - Pneumatic ankle tourniquet   - Electrocautery     Materials:  Implant Name Type Inv. Item Serial No.  Lot No. LRB No. Used Action   STAPLE DYNANITE KLAUS W/INSTRUS 9 X 7MM - [id number]  STAPLE DYNANITE KLAUS W/INSTRUS 9 X 7MM  [id number] [id number] 1 Implanted       Operative Findings:  Consistent with diagnosis     Complications:   None    Procedure and Technique:     Under mild sedation, the patient was brought into the operating room and placed on the operating room table in the supine position. IV sedation was achieved by anesthesia team and a universal timeout was performed where all parties are in agreement of correct patient, correct procedure and correct site. A pneumatic tourniquet was then placed over the patient's right lower extremity with ample padding. A Russ and 2nd digit block was performed consisting of 15 ml of 0.5% Bupivacaine and 1% Lidocaine in a 1:1 mixture. The foot was then prepped and draped in the usual aseptic manner.  An esmarch bandage was used to [employer] the foot and the pneumatic tourniquet was then inflated to 250 mmHg. Attention was then directed to the dorsal aspect of the first metatarsophalangeal joint medial to the EHL tendon where an approximately 6 cm linear incision was made through skin and subcutaneous tissue. The incision was deepened through the subcutaneous tissues using sharp and blunt dissection. Care was taken to identify and retract all vital neural and vascular structures. All bleeders were cauterized and ligated as necessary. An L-shaped capsuloptomy was performed over the dorsal aspect of the MPJ. The periosteal and capsular structures were then carefully dissected free of their osseous attachments and reflected medially and laterally, thus exposing the head of the first metatarsal at the operative site. Osteophytes were then observed within the MTPJ joint space, and removed and passed off of the surgical field. Attention was then directed to the first metatarsal head where the medial, lateral, and dorsal exostosis was resected by the sagittal bone saw. The surgical site was then irrigated with copious amounts of sterile saline. 1st metatarsal head was then palpated and no further prominent bone was appreciated. Skin incision was then extended distally over the hallux and medial to proximal nail bed, ensuring to protect nail matrix. Incision was carried through subcutaneous tissue, and capsular tissue was reflected medially and laterally exposing the dorsal proximal phalanx. Attention was then directed to the dorsal proximal phalanx of the hallux, where an [place] osteotomy was made by use of sagittal bone saw, and fixated with [employer] Dynanite staple, per manufacturers specifications. Surgical site was then again thoroughly irrigated with normal sterile saline. The periosteal and capsular structures were reapproximated using 3-0 Vicryl.  The subcutaneous  tissues were reapproximated using 4-0 Vicryl and the skin was reapproximated using 4-0 Nylon in a horizontal mattress suture technique. Attention was then directed to the right 2nd toe. A hammertoe deformity was present. A  dorsal semi-ellipse incision was made over the DIPJ. The incision was then deepened via sharp dissection through the subcutaneous tissues, ligating all venous vessels as necessary. Dissection was carried to the level of the EDL tendon. The tendon was then transected at that level. The DIPJ was then exposed and the medial and lateral collateral ligaments were incised to expose the head of the middle phalanx. By use of sagittal saw, the head of the middle phalanx was resected. Push test showed that the contracture was resolved. Surgical site was irrigated with normal sterile saline and skin closure obtained using Nylon suture in simple interrupted and horizontal mattress techniques. The foot was then cleansed and dried, and dressing applied consisting of Betadine soaked Adaptic, 4x4 gauze, and Nilo wrap. The tourniquet was deflated at approximately 83 min and normal hyperemic response was noted to all digits. The patient tolerated the procedure and anesthesia well without immediate complications and transferred to PACU with vital signs stable. Dr. Jhon Sutherland was present during the entire procedure and participated in all key aspects. SIGNATURE: Michael Valenzuela DPM  DATE: October 13, 2023  TIME: 9:17 AM      Portions of the record may have been created with voice recognition software. Occasional wrong word or "sound a like" substitutions may have occurred due to the inherent limitations of voice recognition software. Read the chart carefully and recognize, using context, where substitutions have occurred.

## 2023-10-13 NOTE — DISCHARGE SUMMARY
Discharge Summary Outpatient Procedure Podiatry -   Shamika Rilye 60 y.o. male MRN: [id number]  Unit/Bed#: OR POOL Encounter: [id number]    Admission Date: 10/13/2023     Admitting Diagnosis: Hallux rigidus of right foot [M20.21]  Hallux valgus of right foot [M20.11]  Acquired hammer toe of right foot [M20.41]    Discharge Diagnosis: same    Procedures Performed: CHEILECTOMYDruscilla Winkelman: [id number] (CPT®)  2nd HAMMERTOE REPAIR:     Complications: none    Condition at Discharge: stable    Discharge instructions/Information to patient and family:   See after visit summary for information provided to patient and family. Provisions for Follow-Up Care/Important appointments:  See after visit summary for information related to follow-up care and any pertinent home health orders. Discharge Medications:  See after visit summary for reconciled discharge medications provided to patient and family.

## 2023-10-13 NOTE — ANESTHESIA PREPROCEDURE EVALUATION
Procedure:  CHEILECTOMY (Right: Foot)  BUNION MAIRA (Right: Foot)  2nd HAMMERTOE REPAIR (Right: Foot)    Relevant Problems   ANESTHESIA (within normal limits)      CARDIO   (+) Atrial flutter (HCC)   (+) Essential hypertension   (+) Hypercholesterolemia   (+) Migraine headache   (+) Paroxysmal atrial fibrillation and flutter (HCC)      ENDO (within normal limits)      GI/HEPATIC (within normal limits)      /RENAL   (+) Kidney stone      NEURO/PSYCH   (+) Migraine headache      PULMONARY   (+) Dyspnea and respiratory abnormality        Physical Exam    Airway    Mallampati score: II         Dental        Cardiovascular  Cardiovascular exam normal    Pulmonary  Pulmonary exam normal     Other Findings        Anesthesia Plan  ASA Score- 2     Anesthesia Type- general with ASA Monitors. Additional Monitors:     Airway Plan: LMA. Plan Factors-    Chart reviewed. Existing labs reviewed. Patient summary reviewed. Obstructive sleep apnea risk education given perioperatively. Induction- intravenous. Postoperative Plan-     Informed Consent- Anesthetic plan and risks discussed with patient.

## 2023-10-13 NOTE — DISCHARGE INSTR - AVS FIRST PAGE
Discharge Instructions - Podiatry    Weight Bearing Status: Weightbearing as tolerated in surgical shoe. Pain: Continue analgesics as directed    Follow-up appointment instructions: Please make an appointment within one week of discharge with Dr. Dre Flood. Contact sooner if any increase in pain, or signs of infection occur    Patient Wound Care Instructions: Leave dressings clean, dry, and intact until 1st outpatient office visit.

## 2023-10-13 NOTE — ANESTHESIA POSTPROCEDURE EVALUATION
Post-Op Assessment Note    CV Status:  Stable    Pain management: adequate     Mental Status:  Alert and awake   Hydration Status:  Euvolemic   PONV Controlled:  Controlled   Airway Patency:  Patent   Two or more mitigation strategies used for obstructive sleep apnea   Post Op Vitals Reviewed: Yes      Staff: Anesthesiologist, CRNA         No notable events documented.     BP      Temp     Pulse     Resp      SpO2      /76   Pulse 82   Temp (!) 97 °F (36.1 °C) (Temporal)   Resp 18   Ht 6' (1.829 m)   Wt 99.2 kg (218 lb 11.1 oz)   SpO2 94%   BMI 29.66 kg/m²

## 2023-10-19 ENCOUNTER — TELEPHONE (OUTPATIENT)
Dept: PODIATRY | Facility: CLINIC | Age: 63
End: 2023-10-19

## 2023-10-19 NOTE — TELEPHONE ENCOUNTER
Caller: Susana    Doctor/Office: Saroj Cuello DPM      Escalation: Susana just wanted to know if Eusebio Dacosta had the surgery on 10/13/23. I confirmed that he did.

## 2023-10-24 ENCOUNTER — OFFICE VISIT (OUTPATIENT)
Dept: PODIATRY | Facility: CLINIC | Age: 63
End: 2023-10-24

## 2023-10-24 VITALS
RESPIRATION RATE: 18 BRPM | HEART RATE: 82 BPM | DIASTOLIC BLOOD PRESSURE: 92 MMHG | BODY MASS INDEX: 29.57 KG/M2 | SYSTOLIC BLOOD PRESSURE: 144 MMHG | HEIGHT: 72 IN

## 2023-10-24 DIAGNOSIS — M20.21 HALLUX RIGIDUS OF RIGHT FOOT: ICD-10-CM

## 2023-10-24 DIAGNOSIS — M20.11 HALLUX VALGUS OF RIGHT FOOT: Primary | ICD-10-CM

## 2023-10-24 DIAGNOSIS — M20.41 ACQUIRED HAMMER TOE OF RIGHT FOOT: ICD-10-CM

## 2023-10-24 PROCEDURE — 99024 POSTOP FOLLOW-UP VISIT: CPT | Performed by: PODIATRIST

## 2023-10-24 NOTE — PROGRESS NOTES
Patient presents approximately 10 days post cheilectomy/Akin right foot and DIPJ arthroplasty right foot. Minimal if any pain related. Surgical site healing without infection. Minimal edema present. Patient is rescheduled on November 2 for suture removal.  He is to continue with current orders.

## 2023-11-02 ENCOUNTER — OFFICE VISIT (OUTPATIENT)
Dept: PODIATRY | Facility: CLINIC | Age: 63
End: 2023-11-02

## 2023-11-02 VITALS
BODY MASS INDEX: 29.8 KG/M2 | WEIGHT: 220 LBS | DIASTOLIC BLOOD PRESSURE: 93 MMHG | HEART RATE: 87 BPM | RESPIRATION RATE: 18 BRPM | HEIGHT: 72 IN | SYSTOLIC BLOOD PRESSURE: 154 MMHG

## 2023-11-02 DIAGNOSIS — M20.21 HALLUX RIGIDUS OF RIGHT FOOT: ICD-10-CM

## 2023-11-02 DIAGNOSIS — M20.41 ACQUIRED HAMMER TOE OF RIGHT FOOT: ICD-10-CM

## 2023-11-02 DIAGNOSIS — M20.11 HALLUX VALGUS OF RIGHT FOOT: Primary | ICD-10-CM

## 2023-11-02 PROCEDURE — 99024 POSTOP FOLLOW-UP VISIT: CPT | Performed by: PODIATRIST

## 2023-11-02 NOTE — PROGRESS NOTES
Patient presents approximately 3 weeks post cheilectomy right foot along with Wolf right hallux. Also DIPJ arthroplasty right second toe. No pain is related. Surgical sites are healing uneventfully. All sutures were removed. Patient may now get right foot wet and return to a comfortable running shoe. He will be rescheduled in 3 weeks.

## 2023-11-27 ENCOUNTER — TELEPHONE (OUTPATIENT)
Age: 63
End: 2023-11-27

## 2023-11-27 NOTE — TELEPHONE ENCOUNTER
Caller: Patient    Doctor: Thad Le    Reason for call: Is calling about the status of his work paperwork. He needs it ASAP. His return to work date is Dec 1, 2023.   Thank you    Call back#: [phone]

## 2023-11-28 ENCOUNTER — OFFICE VISIT (OUTPATIENT)
Dept: DERMATOLOGY | Facility: CLINIC | Age: 63
End: 2023-11-28
Payer: COMMERCIAL

## 2023-11-28 VITALS — WEIGHT: 218.9 LBS | TEMPERATURE: 97.5 F | BODY MASS INDEX: 29.65 KG/M2 | HEIGHT: 72 IN

## 2023-11-28 DIAGNOSIS — D22.5 MULTIPLE BENIGN MELANOCYTIC NEVI OF BOTH UPPER EXTREMITIES, BOTH LOWER EXTREMITIES, AND TRUNK: ICD-10-CM

## 2023-11-28 DIAGNOSIS — D22.62 MULTIPLE BENIGN MELANOCYTIC NEVI OF BOTH UPPER EXTREMITIES, BOTH LOWER EXTREMITIES, AND TRUNK: ICD-10-CM

## 2023-11-28 DIAGNOSIS — D18.01 CHERRY ANGIOMA: ICD-10-CM

## 2023-11-28 DIAGNOSIS — D22.72 MULTIPLE BENIGN MELANOCYTIC NEVI OF BOTH UPPER EXTREMITIES, BOTH LOWER EXTREMITIES, AND TRUNK: ICD-10-CM

## 2023-11-28 DIAGNOSIS — L82.1 SEBORRHEIC KERATOSIS: Primary | ICD-10-CM

## 2023-11-28 DIAGNOSIS — L81.4 LENTIGO: ICD-10-CM

## 2023-11-28 DIAGNOSIS — D69.2 PIGMENTED PURPURA (HCC): ICD-10-CM

## 2023-11-28 DIAGNOSIS — D22.71 MULTIPLE BENIGN MELANOCYTIC NEVI OF BOTH UPPER EXTREMITIES, BOTH LOWER EXTREMITIES, AND TRUNK: ICD-10-CM

## 2023-11-28 DIAGNOSIS — D22.61 MULTIPLE BENIGN MELANOCYTIC NEVI OF BOTH UPPER EXTREMITIES, BOTH LOWER EXTREMITIES, AND TRUNK: ICD-10-CM

## 2023-11-28 PROCEDURE — 99214 OFFICE O/P EST MOD 30 MIN: CPT | Performed by: DERMATOLOGY

## 2023-11-28 NOTE — PATIENT INSTRUCTIONS
SEBORRHEIC KERATOSES  - Relevant exam: Scattered over the trunk/extremities are waxy brown to black plaques and papules with stuck on appearance and consistent dermoscopy  - Exam and clinical history consistent with seborrheic keratoses  - Counseled that these are benign growths that do not require treatment  - Counseled that removal of lesions is considered cosmetic and so would incur a fee should patient elect to move forward. MELANOCYTIC NEVI  -Relevant exam: Scattered over the trunk/extremities are homogenously pigmented brown macules and papules. ELM performed and without concerning findings. No outliers unless otherwise noted in today's note  - Exam and clinical history consistent with melanocytic nevi  - Educated on the ABCDE's of melanoma; handout provided  - Counseled to return to clinic prior to scheduled appointment should any of these lesions change or should any new lesions of concern arise  - Counseled on use of sun protection daily. Reviewed latest FDA sunscreen guidelines, including use of broad spectrum (UVA and UVB blocking) sunscreen or sun protective clothing with SPF 30-50 every 2-3 hours and reapplied after exposure to water; use of photoprotective clothing, including a broad brim hat and UPF rated clothing if outdoors for several hours; avoid use of tanning beds as these pose significant risk for melanoma and skin cancer. LENTIGINES  OTHER SKIN CHANGES DUE TO CHRONIC EXPOSURE TO NONIONIZING RADIATION  - Relevant exam: Over sun exposed areas are brown macules. ELM performed and without concerning findings. - Exam and clinical history consistent with lentigines. - Educated that these are indicative of prior sun exposure. - Counseled to return to clinic prior to scheduled appointment should any of these lesions change or should any new lesions of concern arise.  - Recommended use of sunscreen as above and below. - Counseled on use of sun protection daily.  Reviewed latest FDA sunscreen guidelines, including use of broad spectrum (UVA and UVB blocking) sunscreen or sun protective clothing with SPF 30-50 every 2-3 hours and reapplied after exposure to water; use of photoprotective clothing, including a broad brim hat and UPF rated clothing if outdoors for several hours; avoid use of tanning beds as these pose significant risk for melanoma and skin cancer. CHERRY ANGIOMAS  - Relevant exam: Scattered over the trunk/extremities are red papules  - Exam and clinical history consistent with cherry angiomas  - Educated that these are benign  - Educated that removal is considered aesthetic and would incur a fee. SUSPECTED PIGMENTED PURPURA    Assessment and Plan:  Based on a thorough discussion of this condition and the management approach to it (including a comprehensive discussion of the known risks, side effects and potential benefits of treatment), the patient (family) agrees to implement the following specific plan:  Discussed this can be caused by blood vessels that leak into the skin. This can leave a temporary staining of the skin that will gradually return to normal.  Recommend wearing compression socks when on your feet for prolonged periods of time. If symptoms recur, contact our office.

## 2023-11-28 NOTE — PROGRESS NOTES
[facility] Note     Patient Name: Minoo Groves  Encounter Date: 11/28/2023     Have you been cared for by a [facility]atologist in the last 3 years and, if so, which description applies to you? Yes. I have been here within the last 3 years, and my medical history has NOT changed since that time. I am MALE/not capable of bearing children. REVIEW OF SYSTEMS:  Have you recently had or currently have any of the following? No changes in my recent health. PAST MEDICAL HISTORY:  Have you personally ever had or currently have any of the following? If "YES," then please provide more detail. No changes in my medical history. HISTORY OF IMMUNOSUPPRESSION: Do you have a history of any of the following:  Systemic Immunosuppression such as Diabetes, Biologic or Immunotherapy, Chemotherapy, Organ Transplantation, Bone Marrow Transplantation? No     Answering "YES" requires the addition of the dotphrase "IMMUNOSUPPRESSED" as the first diagnosis of the patient's visit. FAMILY HISTORY:  Any "first degree relatives" (parent, brother, sister, or child) with the following? No changes in my family's known health. PATIENT EXPERIENCE:    Do you want the Dermatologist to perform a COMPLETE skin exam today including a clinical examination under the "bra and underwear" areas? Yes  If necessary, do we have your permission to call and leave a detailed message on your Preferred Phone number that includes your specific medical information?   Yes      Allergies   Allergen Reactions    Lisinopril Edema     Annotation - [id number]: lip swelling x 2 2015    Dust Mite Extract     Short Ragweed Pollen Ext       Current Outpatient Medications:     apixaban (Eliquis) 5 mg, Take 1 tablet (5 mg total) by mouth 2 (two) times a day, Disp: 180 tablet, Rfl: 0    atorvastatin (LIPITOR) 10 mg tablet, Take 1 tablet (10 mg total) by mouth daily, Disp: 90 tablet, Rfl: 0    azelastine (ASTELIN) 0.1 % nasal spray, 1 spray into each nostril if needed Use in each nostril as directed, Disp: , Rfl:     diltiazem (CARDIZEM CD) 180 mg 24 hr capsule, Take 1 capsule (180 mg total) by mouth daily, Disp: 90 capsule, Rfl: 0    losartan (COZAAR) 100 MG tablet, Take 1 tablet (100 mg total) by mouth daily (Patient taking differently: Take 100 mg by mouth every evening), Disp: 90 tablet, Rfl: 0    oxyCODONE-acetaminophen (Percocet) 5-325 mg per tablet, Take 1 tablet by mouth every 4 (four) hours as needed for moderate pain for up to 15 doses Max Daily Amount: 6 tablets, Disp: 15 tablet, Rfl: 0          Whom besides the patient is providing clinical information about today's encounter? NO ADDITIONAL HISTORIAN (patient alone provided history)    Physical Exam and Assessment/Plan by Diagnosis:    SEBORRHEIC KERATOSES  - Relevant exam: Scattered over the trunk/extremities are waxy brown to black plaques and papules with stuck on appearance and consistent dermoscopy  - Exam and clinical history consistent with seborrheic keratoses  - Counseled that these are benign growths that do not require treatment  - Counseled that removal of lesions is considered cosmetic and so would incur a fee should patient elect to move forward. MELANOCYTIC NEVI  -Relevant exam: Scattered over the trunk/extremities are homogenously pigmented brown macules and papules. ELM performed and without concerning findings. No outliers unless otherwise noted in today's note  - Exam and clinical history consistent with melanocytic nevi  - Educated on the ABCDE's of melanoma; handout provided  - Counseled to return to clinic prior to scheduled appointment should any of these lesions change or should any new lesions of concern arise  - Counseled on use of sun protection daily.  Reviewed latest FDA sunscreen guidelines, including use of broad spectrum (UVA and UVB blocking) sunscreen or sun protective clothing with SPF 30-50 every 2-3 hours and reapplied after exposure to water; use of photoprotective clothing, including a broad brim hat and UPF rated clothing if outdoors for several hours; avoid use of tanning beds as these pose significant risk for melanoma and skin cancer. LENTIGINES  OTHER SKIN CHANGES DUE TO CHRONIC EXPOSURE TO NONIONIZING RADIATION  - Relevant exam: Over sun exposed areas are brown macules. ELM performed and without concerning findings. - Exam and clinical history consistent with lentigines. - Educated that these are indicative of prior sun exposure. - Counseled to return to clinic prior to scheduled appointment should any of these lesions change or should any new lesions of concern arise.  - Recommended use of sunscreen as above and below. - Counseled on use of sun protection daily. Reviewed latest FDA sunscreen guidelines, including use of broad spectrum (UVA and UVB blocking) sunscreen or sun protective clothing with SPF 30-50 every 2-3 hours and reapplied after exposure to water; use of photoprotective clothing, including a broad brim hat and UPF rated clothing if outdoors for several hours; avoid use of tanning beds as these pose significant risk for melanoma and skin cancer. CHERRY ANGIOMAS  - Relevant exam: Scattered over the trunk/extremities are red papules  - Exam and clinical history consistent with cherry angiomas  - Educated that these are benign  - Educated that removal is considered aesthetic and would incur a fee. SUSPECTED PIGMENTED PURPURA  POST INFLAMMATORY HYPERPIGMENTATION  Physical Exam:  Anatomic Location Affected:  Bilateral ankles  Morphological Description:  residual hyperpigmented brown macules on medial shins and ankles  Pertinent Positives:  Pertinent Negatives: Additional History of Present Condition:  Patient reports he had a rash over the summer on his medial ankles that started after mowing his lawn and cleared up after 2-3 days, but left discoloration. Reports the rash burned when he took a shower.  Patient is a  for [employer]. Assessment and Plan:  Based on a thorough discussion of this condition and the management approach to it (including a comprehensive discussion of the known risks, side effects and potential benefits of treatment), the patient (family) agrees to implement the following specific plan:  Discussed this can be caused by blood that leaks into the skin. This can leave a temporary staining of the skin that will gradually return to normal.  Recommend wearing compression socks when on your feet for prolonged periods of time. If symptoms recur, contact our office.   Would consider active treatment with topical steroids for active flares      Scribe Attestation      I,:  Antonio Gaxiola am acting as a scribe while in the presence of the attending physician.:       I,:  Adilene Valiente MD personally performed the services described in this documentation    as scribed in my presence.:

## 2023-11-30 ENCOUNTER — OFFICE VISIT (OUTPATIENT)
Dept: PODIATRY | Facility: CLINIC | Age: 63
End: 2023-11-30

## 2023-11-30 VITALS
BODY MASS INDEX: 29.53 KG/M2 | DIASTOLIC BLOOD PRESSURE: 81 MMHG | WEIGHT: 218 LBS | SYSTOLIC BLOOD PRESSURE: 146 MMHG | HEIGHT: 72 IN | HEART RATE: 82 BPM

## 2023-11-30 DIAGNOSIS — M20.11 HALLUX VALGUS OF RIGHT FOOT: ICD-10-CM

## 2023-11-30 DIAGNOSIS — M20.41 ACQUIRED HAMMER TOE OF RIGHT FOOT: Primary | ICD-10-CM

## 2023-11-30 PROCEDURE — 99024 POSTOP FOLLOW-UP VISIT: CPT | Performed by: PODIATRIST

## 2023-11-30 NOTE — PROGRESS NOTES
Patient presents approximately 7 weeks post cheilectomy/Akin right foot and hallux. Also DIPJ arthroplasty right second toe. Patient is wearing a running shoe comfortably. There is mild edema noted but this should resolve with time. A 4-0 nylon suture was noted and removed. Bacitracin dressing applied. Patient is returning to work tomorrow.   Reappoint as needed

## 2023-12-16 DIAGNOSIS — I10 ESSENTIAL HYPERTENSION: ICD-10-CM

## 2023-12-16 DIAGNOSIS — E78.00 HYPERCHOLESTEROLEMIA: ICD-10-CM

## 2023-12-16 DIAGNOSIS — I48.92 ATRIAL FLUTTER, UNSPECIFIED TYPE (HCC): ICD-10-CM

## 2023-12-19 RX ORDER — LOSARTAN POTASSIUM 100 MG/1
100 TABLET ORAL EVERY EVENING
Qty: 90 TABLET | Refills: 30 | Status: SHIPPED | OUTPATIENT
Start: 2023-12-19

## 2023-12-19 RX ORDER — ATORVASTATIN CALCIUM 10 MG/1
10 TABLET, FILM COATED ORAL DAILY
Qty: 90 TABLET | Refills: 3 | Status: SHIPPED | OUTPATIENT
Start: 2023-12-19

## 2023-12-19 RX ORDER — DILTIAZEM HYDROCHLORIDE 180 MG/1
180 CAPSULE, COATED, EXTENDED RELEASE ORAL DAILY
Qty: 90 CAPSULE | Refills: 3 | Status: SHIPPED | OUTPATIENT
Start: 2023-12-19

## 2024-03-15 DIAGNOSIS — E78.00 HYPERCHOLESTEROLEMIA: ICD-10-CM

## 2024-03-15 DIAGNOSIS — I48.92 ATRIAL FLUTTER, UNSPECIFIED TYPE (HCC): ICD-10-CM

## 2024-03-15 DIAGNOSIS — I10 ESSENTIAL HYPERTENSION: ICD-10-CM

## 2024-03-20 RX ORDER — ATORVASTATIN CALCIUM 10 MG/1
10 TABLET, FILM COATED ORAL DAILY
Qty: 90 TABLET | Refills: 0 | Status: SHIPPED | OUTPATIENT
Start: 2024-03-20

## 2024-03-20 RX ORDER — LOSARTAN POTASSIUM 100 MG/1
100 TABLET ORAL EVERY EVENING
Qty: 90 TABLET | Refills: 0 | Status: SHIPPED | OUTPATIENT
Start: 2024-03-20

## 2024-03-20 RX ORDER — DILTIAZEM HYDROCHLORIDE 180 MG/1
180 CAPSULE, COATED, EXTENDED RELEASE ORAL DAILY
Qty: 90 CAPSULE | Refills: 0 | Status: SHIPPED | OUTPATIENT
Start: 2024-03-20

## 2024-05-20 ENCOUNTER — TELEPHONE (OUTPATIENT)
Dept: FAMILY MEDICINE CLINIC | Facility: CLINIC | Age: 64
End: 2024-05-20

## 2024-05-20 NOTE — TELEPHONE ENCOUNTER
LVM for pt to call back to reschedule is June 14, 2024 appointment with Dr. Holcomb due to him not being in the office that day.  
Plan: Referral to Dr. Mcgowan for Oncology \\nReferral to Dr. Rodriguez or Dr. Justice for removal and sentinel lymph node biopsy
Detail Level: Zone

## 2024-06-14 ENCOUNTER — OFFICE VISIT (OUTPATIENT)
Dept: FAMILY MEDICINE CLINIC | Facility: CLINIC | Age: 64
End: 2024-06-14
Payer: COMMERCIAL

## 2024-06-14 ENCOUNTER — APPOINTMENT (OUTPATIENT)
Dept: LAB | Facility: MEDICAL CENTER | Age: 64
End: 2024-06-14
Payer: COMMERCIAL

## 2024-06-14 ENCOUNTER — APPOINTMENT (OUTPATIENT)
Dept: LAB | Facility: MEDICAL CENTER | Age: 64
End: 2024-06-14

## 2024-06-14 VITALS
TEMPERATURE: 97.6 F | HEIGHT: 72 IN | DIASTOLIC BLOOD PRESSURE: 82 MMHG | RESPIRATION RATE: 20 BRPM | WEIGHT: 214.8 LBS | OXYGEN SATURATION: 97 % | HEART RATE: 85 BPM | BODY MASS INDEX: 29.09 KG/M2 | SYSTOLIC BLOOD PRESSURE: 142 MMHG

## 2024-06-14 DIAGNOSIS — M54.41 CHRONIC RIGHT-SIDED LOW BACK PAIN WITH RIGHT-SIDED SCIATICA: ICD-10-CM

## 2024-06-14 DIAGNOSIS — F17.211 CIGARETTE NICOTINE DEPENDENCE IN REMISSION: ICD-10-CM

## 2024-06-14 DIAGNOSIS — Z12.5 SCREENING FOR PROSTATE CANCER: ICD-10-CM

## 2024-06-14 DIAGNOSIS — I10 ESSENTIAL HYPERTENSION: ICD-10-CM

## 2024-06-14 DIAGNOSIS — Z00.8 HEALTH EXAMINATION IN POPULATION SURVEY: ICD-10-CM

## 2024-06-14 DIAGNOSIS — Z00.00 ANNUAL PHYSICAL EXAM: Primary | ICD-10-CM

## 2024-06-14 DIAGNOSIS — M72.2 PLANTAR FASCIITIS: ICD-10-CM

## 2024-06-14 DIAGNOSIS — G89.29 CHRONIC RIGHT-SIDED LOW BACK PAIN WITH RIGHT-SIDED SCIATICA: ICD-10-CM

## 2024-06-14 LAB
ALBUMIN SERPL BCP-MCNC: 4.2 G/DL (ref 3.5–5)
ALP SERPL-CCNC: 84 U/L (ref 34–104)
ALT SERPL W P-5'-P-CCNC: 29 U/L (ref 7–52)
ANION GAP SERPL CALCULATED.3IONS-SCNC: 10 MMOL/L (ref 4–13)
AST SERPL W P-5'-P-CCNC: 20 U/L (ref 13–39)
BASOPHILS # BLD AUTO: 0.05 THOUSANDS/ÂΜL (ref 0–0.1)
BASOPHILS NFR BLD AUTO: 1 % (ref 0–1)
BILIRUB SERPL-MCNC: 0.81 MG/DL (ref 0.2–1)
BUN SERPL-MCNC: 14 MG/DL (ref 5–25)
CALCIUM SERPL-MCNC: 9.3 MG/DL (ref 8.4–10.2)
CHLORIDE SERPL-SCNC: 102 MMOL/L (ref 96–108)
CHOLEST SERPL-MCNC: 152 MG/DL
CO2 SERPL-SCNC: 26 MMOL/L (ref 21–32)
CREAT SERPL-MCNC: 0.98 MG/DL (ref 0.6–1.3)
EOSINOPHIL # BLD AUTO: 0.14 THOUSAND/ÂΜL (ref 0–0.61)
EOSINOPHIL NFR BLD AUTO: 2 % (ref 0–6)
ERYTHROCYTE [DISTWIDTH] IN BLOOD BY AUTOMATED COUNT: 13 % (ref 11.6–15.1)
EST. AVERAGE GLUCOSE BLD GHB EST-MCNC: 108 MG/DL
GFR SERPL CREATININE-BSD FRML MDRD: 81 ML/MIN/1.73SQ M
GLUCOSE P FAST SERPL-MCNC: 98 MG/DL (ref 65–99)
HBA1C MFR BLD: 5.4 %
HCT VFR BLD AUTO: 46.6 % (ref 36.5–49.3)
HDLC SERPL-MCNC: 55 MG/DL
HGB BLD-MCNC: 15.8 G/DL (ref 12–17)
IMM GRANULOCYTES # BLD AUTO: 0.03 THOUSAND/UL (ref 0–0.2)
IMM GRANULOCYTES NFR BLD AUTO: 0 % (ref 0–2)
LDLC SERPL CALC-MCNC: 72 MG/DL (ref 0–100)
LYMPHOCYTES # BLD AUTO: 3.07 THOUSANDS/ÂΜL (ref 0.6–4.47)
LYMPHOCYTES NFR BLD AUTO: 37 % (ref 14–44)
MCH RBC QN AUTO: 33 PG (ref 26.8–34.3)
MCHC RBC AUTO-ENTMCNC: 33.9 G/DL (ref 31.4–37.4)
MCV RBC AUTO: 97 FL (ref 82–98)
MONOCYTES # BLD AUTO: 0.7 THOUSAND/ÂΜL (ref 0.17–1.22)
MONOCYTES NFR BLD AUTO: 8 % (ref 4–12)
NEUTROPHILS # BLD AUTO: 4.4 THOUSANDS/ÂΜL (ref 1.85–7.62)
NEUTS SEG NFR BLD AUTO: 52 % (ref 43–75)
NONHDLC SERPL-MCNC: 97 MG/DL
NRBC BLD AUTO-RTO: 0 /100 WBCS
PLATELET # BLD AUTO: 268 THOUSANDS/UL (ref 149–390)
PMV BLD AUTO: 9.9 FL (ref 8.9–12.7)
POTASSIUM SERPL-SCNC: 4.6 MMOL/L (ref 3.5–5.3)
PROT SERPL-MCNC: 7.6 G/DL (ref 6.4–8.4)
PSA SERPL-MCNC: 1.02 NG/ML (ref 0–4)
RBC # BLD AUTO: 4.79 MILLION/UL (ref 3.88–5.62)
SODIUM SERPL-SCNC: 138 MMOL/L (ref 135–147)
TRIGL SERPL-MCNC: 123 MG/DL
WBC # BLD AUTO: 8.39 THOUSAND/UL (ref 4.31–10.16)

## 2024-06-14 PROCEDURE — G0103 PSA SCREENING: HCPCS

## 2024-06-14 PROCEDURE — 99396 PREV VISIT EST AGE 40-64: CPT | Performed by: FAMILY MEDICINE

## 2024-06-14 PROCEDURE — 80053 COMPREHEN METABOLIC PANEL: CPT

## 2024-06-14 PROCEDURE — 83036 HEMOGLOBIN GLYCOSYLATED A1C: CPT

## 2024-06-14 PROCEDURE — 85025 COMPLETE CBC W/AUTO DIFF WBC: CPT

## 2024-06-14 PROCEDURE — 80061 LIPID PANEL: CPT

## 2024-06-14 PROCEDURE — 36415 COLL VENOUS BLD VENIPUNCTURE: CPT

## 2024-06-14 NOTE — ASSESSMENT & PLAN NOTE
6 month of low back radiating down right side, worse with yard work or sitting for long periods of time, had this off an on in the past, refer to PT, if no improvement check imaging

## 2024-06-14 NOTE — ASSESSMENT & PLAN NOTE
Remains controlled, continue on same medications as prescribed by his cardiologist, has lab order for A1c and lipid panel prior to appointment

## 2024-06-14 NOTE — PROGRESS NOTES
Adult Annual Physical  Name: Eric Ron      : 1960      MRN: [id number]  Encounter Provider: Juan Mix MD  Encounter Date: 2024   Encounter department: [facility] PRIMARY CARE    Assessment & Plan   1. Annual physical exam  2. Essential hypertension  Assessment & Plan:  Remains controlled, continue on same medications as prescribed by his cardiologist, has lab order for A1c and lipid panel prior to appointment  Orders:  -     Comprehensive metabolic panel; Future  -     CBC and differential; Future  3. Cigarette nicotine dependence in remission  -     CT lung screening program; Future; Expected date: 2024  4. Screening for prostate cancer  -     PSA, Total Screen; Future  5. Chronic right-sided low back pain with right-sided sciatica  Assessment & Plan:  6 month of low back radiating down right side, worse with yard work or sitting for long periods of time, had this off an on in the past, refer to PT, if no improvement check imaging  Orders:  -     Ambulatory Referral to Physical Therapy; Future  6. Plantar fasciitis  Assessment & Plan:  Recommend exercises and night splint, if not improving PT vs podiatry referal    Immunizations and preventive care screenings were discussed with patient today. Appropriate education was printed on patient's after visit summary.    Discussed risks and benefits of prostate cancer screening. We discussed the controversial history of PSA screening for prostate cancer in the [place] as well as the risk of over detection and over treatment of prostate cancer by way of PSA screening.  The patient understands that PSA blood testing is an imperfect way to screen for prostate cancer and that elevated PSA levels in the blood may also be caused by infection, inflammation, prostatic trauma or manipulation, urological procedures, or by benign prostatic enlargement.    The role of the digital rectal examination in prostate cancer  screening was also discussed and I discussed with him that there is large interobserver variability in the findings of digital rectal examination.    Up to date on coloscopy due 2025    Counseling:  Alcohol/drug use: discussed moderation in alcohol intake, the recommendations for healthy alcohol use, and avoidance of illicit drug use.  Dental Health: discussed importance of regular tooth brushing, flossing, and dental visits.  Injury prevention: discussed safety/seat belts, safety helmets, smoke detectors, carbon dioxide detectors, and smoking near bedding or upholstery.  Exercise: the importance of regular exercise/physical activity was discussed. Recommend exercise 3-5 times per week for at least 30 minutes.          History of Present Illness     Adult Annual Physical  Review of Systems   Constitutional:  Negative for activity change and appetite change.   Respiratory:  Negative for apnea and chest tightness.    Cardiovascular:  Negative for chest pain and palpitations.   Gastrointestinal:  Negative for abdominal distention and abdominal pain.   Musculoskeletal:  Negative for arthralgias and back pain.         Objective     /82 (BP Location: Left arm, Patient Position: Sitting, Cuff Size: Large)   Pulse 85   Temp 97.6 °F (36.4 °C) (Tympanic)   Resp 20   Ht 6' (1.829 m)   Wt 97.4 kg (214 lb 12.8 oz)   SpO2 97%   BMI 29.13 kg/m²     Physical Exam  Constitutional:       Appearance: Normal appearance.   Cardiovascular:      Rate and Rhythm: Normal rate and regular rhythm.   Pulmonary:      Effort: Pulmonary effort is normal.      Breath sounds: Normal breath sounds.   Musculoskeletal:      Lumbar back: No tenderness or bony tenderness. Decreased range of motion. Positive right straight leg raise test. Negative left straight leg raise test.      Right foot: Tenderness present. No bony tenderness.      Left foot: Normal.   Neurological:      General: No focal deficit present.      Mental Status: He is  alert and oriented to person, place, and time.   Psychiatric:         Mood and Affect: Mood normal.       Administrative Statements

## 2024-06-14 NOTE — PATIENT INSTRUCTIONS
1. Screening for prostate cancer  -     PSA, Total Screen; Future  2. Annual physical exam  3. Essential hypertension  -     Comprehensive metabolic panel; Future  4. Smoking  5. Cigarette nicotine dependence in remission  -     CT lung screening program; Future; Expected date: 06/14/2024  6. Chronic right-sided low back pain with right-sided sciatica  Assessment & Plan:  6 month of low back radiating down right side, worse with yard work or sitting for long periods of time, had this off an on in the past

## 2024-07-03 ENCOUNTER — EVALUATION (OUTPATIENT)
Dept: PHYSICAL THERAPY | Facility: MEDICAL CENTER | Age: 64
End: 2024-07-03
Payer: COMMERCIAL

## 2024-07-03 DIAGNOSIS — G89.29 CHRONIC RIGHT-SIDED LOW BACK PAIN WITH RIGHT-SIDED SCIATICA: ICD-10-CM

## 2024-07-03 DIAGNOSIS — M54.41 CHRONIC RIGHT-SIDED LOW BACK PAIN WITH RIGHT-SIDED SCIATICA: ICD-10-CM

## 2024-07-03 PROCEDURE — 97110 THERAPEUTIC EXERCISES: CPT | Performed by: PHYSICAL THERAPIST

## 2024-07-03 PROCEDURE — 97161 PT EVAL LOW COMPLEX 20 MIN: CPT | Performed by: PHYSICAL THERAPIST

## 2024-07-03 NOTE — PROGRESS NOTES
PT Evaluation     Today's date: 7/3/2024  Patient name: Eric Ron  : 1960  MRN: [id number]  Referring provider: Juan Mix*  Dx:   Encounter Diagnosis     ICD-10-CM    1. Chronic right-sided low back pain with right-sided sciatica  M54.41 Ambulatory Referral to Physical Therapy    G89.29                      Assessment  Impairments: abnormal or restricted ROM, activity intolerance, impaired physical strength, lacks appropriate home exercise program, pain with function, weight-bearing intolerance and poor posture   Symptom irritability: low    Assessment details: Eric Ron is a 63 y.o. male who presents to physical therapy with diagnosis of Chronic right-sided low back pain with right-sided sciatica. Eric presents with pain and limitations in range of motion, strength, joint mobility, flexibility, and functional ability. He tested positive for sciatic nerve involvement on the RLE. The current limitations are affecting Eric's ability to function at prior level. He will benefit from skilled physical therapy to address the current impairments and functional limitations to enable he to return to daily activities at maximal level. Thank you for the referral.    Understanding of Dx/Px/POC: good     Prognosis: good    Goals  STG  Patient will decrease pain at worst to 5/10  Patient will be independent with basic HEP    LTG  Patient will decrease pain at worst to 0-2/10  Patient will improve LE strength to 5/5 in all deficit planes  Patient will report ability to complete work tasks without increased symptoms  Patient will report ability to walk his dog without increased symptoms  Patient will improve FOTO greater than or equal to projected score  Patient will be independent with comprehensive HEP    Plan  Patient would benefit from: skilled physical therapy  Planned modality interventions: cryotherapy and thermotherapy: hydrocollator packs    Planned therapy interventions: manual  therapy, neuromuscular re-education, patient education, therapeutic activities, therapeutic exercise, therapeutic training and home exercise program    Frequency: 2x week  Duration in weeks: 6  Plan of Care beginning date: 7/3/2024  Plan of Care expiration date: 2024  Treatment plan discussed with: patient        Subjective Evaluation    History of Present Illness  Mechanism of injury: Eric is a 63 y.o. male presenting to physical therapy on 24 with primary complaints of right sided low back pain that radiates into right leg. He mentions he also gets numbness in the right foot, mainly when he is taking his dog for a walk. He is a  for [employer] and when he is sitting in the car for a while he has pain when he stands up. He has no issues completing his daily tasks but is challenged with household chores such as vacuuming and doing yard work. He does get relief when lying flat on his back with his legs straight.   Patient Goals  Patient goals for therapy: decreased pain, increased strength, return to sport/leisure activities and increased motion  Patient goal: no pain with transition from sit to stand and getting out of car.  Pain  Current pain ratin  At best pain ratin  At worst pain ratin  Location: R side low back  Quality: tight and sharp  Alleviating factors: lying back or lumbar rotation.  Aggravating factors: sitting and standing    Social Support  Steps to enter house: yes (1)  Stairs in house: yes (13)     Employment status: working    Diagnostic Tests  No diagnostic tests performed  Treatments  No previous or current treatments        Objective    Posture: fair, slight rounding of shoulders    Palpation: mild tenderness noted along right lumbar paraspinals    Lumbar AROM:  Within normal limits in all planes of motion, some discomfort noted with bilateral lumbar rotation.    Lower Extremity Strength  Right   Hip Flexion: 5/5   Hip Extension: 5/5  Hip Abduction: 4/5   Hip IR  (90/90): 4+/5  Hip ER (90/90): 5/5  Knee Extension: 5/5   Knee Flexion: 5/5   Ankle DF: 5/5     Left  Hip Flexion: 5/5   Hip Extension: 5/5  Hip Abduction: 4+/5   Hip IR (90/90): 5/5  Hip ER (90/90): 5/5  Knee Extension: 5/5   Knee Flexion: 5/5   Ankle DF: 5/5    Special Tests:  Sciatic N tension positive in seated and supine.    Repeated Motions:  Repeated Flexion: increased symptoms, peripheralize  Repeated Extension: decreased symptoms, centralized,          Precautions: afib, hypertension, hyperlipidemia     Manuals 7/3            Lumbar PAs             PPU with OP                                       Neuro Re-Ed             Supine Sciatic N Glide HEP            Seated Sciatic N [place] HEP                         TA Brace             TA + Bridge                                       Ther Ex             PPU on Elbows HEP            Repeated Extension in Standing HEP            Prone Hip Ext             LTR                                                                 Ther Activity                                       Gait Training                                       Modalities

## 2024-07-09 ENCOUNTER — OFFICE VISIT (OUTPATIENT)
Dept: PHYSICAL THERAPY | Facility: MEDICAL CENTER | Age: 64
End: 2024-07-09
Payer: COMMERCIAL

## 2024-07-09 DIAGNOSIS — G89.29 CHRONIC RIGHT-SIDED LOW BACK PAIN WITH RIGHT-SIDED SCIATICA: Primary | ICD-10-CM

## 2024-07-09 DIAGNOSIS — M54.41 CHRONIC RIGHT-SIDED LOW BACK PAIN WITH RIGHT-SIDED SCIATICA: Primary | ICD-10-CM

## 2024-07-09 PROCEDURE — 97110 THERAPEUTIC EXERCISES: CPT

## 2024-07-09 PROCEDURE — 97112 NEUROMUSCULAR REEDUCATION: CPT

## 2024-07-09 PROCEDURE — 97140 MANUAL THERAPY 1/> REGIONS: CPT

## 2024-07-09 NOTE — PROGRESS NOTES
Daily Note     Today's date: 2024  Patient name: Eric Ron  : 1960  MRN: [id number]  Referring provider: Juan Mix*  Dx:   Encounter Diagnosis     ICD-10-CM    1. Chronic right-sided low back pain with right-sided sciatica  M54.41     G89.29                      Subjective: Pt reports no changes since IE, although he notes that he hasn't been performing his ex's at home.       Objective: See treatment diary below      Assessment: Tolerated treatment well. Patient demonstrated fatigue post treatment, exhibited good technique with therapeutic exercises, and would benefit from continued PT  Pt demonstrated good tolerance to initiated ex's, although bridges were painful, so this was held.  Hypomobility of spine noted w/manuals.  Pt advised to be more diligent with hep for maximum benefit.        Plan: Continue per plan of care.      Precautions: afib, hypertension, hyperlipidemia     Manuals 7/3 7/9           Lumbar PAs  STM  KO           PPU with OP                                       Neuro Re-Ed             Supine Sciatic N Glide HEP 2x10           Seated Sciatic N [place] HEP 2x10                        TA Brace  2x10           TA + Bridge  pain                                     Ther Ex             PPU on Elbows HEP 2x10           Repeated Extension in Standing HEP 2x10           Prone Hip Ext  x10           LTR  2x10                                                               Ther Activity                                       Gait Training                                       Modalities             MHP  Pre tx  15'

## 2024-07-10 ENCOUNTER — OFFICE VISIT (OUTPATIENT)
Dept: CARDIOLOGY CLINIC | Facility: CLINIC | Age: 64
End: 2024-07-10
Payer: COMMERCIAL

## 2024-07-10 VITALS
DIASTOLIC BLOOD PRESSURE: 82 MMHG | HEART RATE: 88 BPM | SYSTOLIC BLOOD PRESSURE: 130 MMHG | HEIGHT: 72 IN | BODY MASS INDEX: 29.53 KG/M2 | WEIGHT: 218 LBS

## 2024-07-10 DIAGNOSIS — I48.92 ATRIAL FLUTTER, UNSPECIFIED TYPE (HCC): ICD-10-CM

## 2024-07-10 DIAGNOSIS — Z79.01 ANTICOAGULATED: ICD-10-CM

## 2024-07-10 DIAGNOSIS — I10 ESSENTIAL HYPERTENSION: ICD-10-CM

## 2024-07-10 DIAGNOSIS — Z86.79 S/P ABLATION OF ATRIAL FIBRILLATION: ICD-10-CM

## 2024-07-10 DIAGNOSIS — E78.00 HYPERCHOLESTEROLEMIA: ICD-10-CM

## 2024-07-10 DIAGNOSIS — I48.0 PAROXYSMAL ATRIAL FIBRILLATION (HCC): Primary | ICD-10-CM

## 2024-07-10 DIAGNOSIS — Z98.890 S/P ABLATION OF ATRIAL FIBRILLATION: ICD-10-CM

## 2024-07-10 DIAGNOSIS — I48.3 TYPICAL ATRIAL FLUTTER (HCC): ICD-10-CM

## 2024-07-10 PROCEDURE — 99213 OFFICE O/P EST LOW 20 MIN: CPT | Performed by: INTERNAL MEDICINE

## 2024-07-10 RX ORDER — CHLORHEXIDINE GLUCONATE ORAL RINSE 1.2 MG/ML
SOLUTION DENTAL
COMMUNITY
Start: 2024-05-06

## 2024-07-10 RX ORDER — DILTIAZEM HYDROCHLORIDE 180 MG/1
180 CAPSULE, COATED, EXTENDED RELEASE ORAL DAILY
Qty: 90 CAPSULE | Refills: 3 | Status: SHIPPED | OUTPATIENT
Start: 2024-07-10

## 2024-07-10 RX ORDER — LOSARTAN POTASSIUM 100 MG/1
100 TABLET ORAL EVERY EVENING
Qty: 90 TABLET | Refills: 3 | Status: SHIPPED | OUTPATIENT
Start: 2024-07-10

## 2024-07-10 RX ORDER — ATORVASTATIN CALCIUM 10 MG/1
10 TABLET, FILM COATED ORAL DAILY
Qty: 90 TABLET | Refills: 3 | Status: SHIPPED | OUTPATIENT
Start: 2024-07-10

## 2024-07-10 NOTE — PROGRESS NOTES
EPS Progress Note - Eric Ron 63 y.o. male MRN: [id number]           ASSESSMENT:  1. Paroxysmal atrial fibrillation and flutter (HCC)        2. Essential hypertension  losartan (COZAAR) 100 MG tablet      3. Typical atrial flutter (HCC)        4. S/P ablation of atrial fibrillation        5. Anticoagulated with Eliquis        6. Atrial flutter, unspecified type (HCC)  diltiazem (CARDIZEM CD) 180 mg 24 hr capsule    apixaban (Eliquis) 5 mg      7. Hypercholesterolemia  atorvastatin (LIPITOR) 10 mg tablet              PLAN:  Continue current medications.  All medications renewed.    HPI:   Interim history he is feeling well. Offers no complaints.              Review of Systems   Constitutional: Negative for chills and fever.   HENT:  Negative for congestion and sore throat.    Eyes:  Negative for blurred vision and double vision.   Cardiovascular:  Negative for chest pain, claudication, dyspnea on exertion, leg swelling, near-syncope, orthopnea, palpitations, paroxysmal nocturnal dyspnea and syncope.   Respiratory:  Negative for cough, shortness of breath and sputum production.    Hematologic/Lymphatic: Negative for bleeding problem. Does not bruise/bleed easily.   Skin:  Negative for itching and rash.   Musculoskeletal:  Negative for arthritis and joint pain.   Gastrointestinal:  Negative for abdominal pain, nausea and vomiting.   Genitourinary:  Negative for hematuria.   Neurological:  Negative for dizziness, focal weakness, headaches, light-headedness and weakness.   Psychiatric/Behavioral:  Negative for depression. The patient is not nervous/anxious.    All other systems reviewed and are negative.       Objective:     Vitals: Blood pressure 130/82, pulse 88, height 6' (1.829 m), weight 98.9 kg (218 lb)., Body mass index is 29.57 kg/m².,        Physical Exam:    GEN: Eric Ron appears well, alert and oriented x 3, pleasant and cooperative   HEENT: pupils equal, round, and reactive to light; extraocular  muscles intact  NECK: supple, no carotid bruits   HEART: regular rhythm, normal S1 and S2, no murmurs, clicks, gallops or rubs   LUNGS: clear to auscultation bilaterally; no wheezes, rales, or rhonchi   ABDOMEN: normal bowel sounds, soft, no tenderness, no distention  EXTREMITIES: peripheral pulses normal; no clubbing, cyanosis, or edema  NEURO: no focal findings   SKIN: normal without suspicious lesions on exposed skin    Medications:      Current Outpatient Medications:     apixaban (Eliquis) 5 mg, Take 1 tablet (5 mg total) by mouth 2 (two) times a day, Disp: 180 tablet, Rfl: 3    atorvastatin (LIPITOR) 10 mg tablet, Take 1 tablet (10 mg total) by mouth daily, Disp: 90 tablet, Rfl: 3    azelastine (ASTELIN) 0.1 % nasal spray, 1 spray into each nostril if needed Use in each nostril as directed, Disp: , Rfl:     chlorhexidine (PERIDEX) 0.12 % solution, , Disp: , Rfl:     diltiazem (CARDIZEM CD) 180 mg 24 hr capsule, Take 1 capsule (180 mg total) by mouth daily, Disp: 90 capsule, Rfl: 3    losartan (COZAAR) 100 MG tablet, Take 1 tablet (100 mg total) by mouth every evening, Disp: 90 tablet, Rfl: 3     Family History   Problem Relation Age of Onset    Other Mother         Sarcoma    Cancer Mother     Hypertension Father     Arthritis Maternal Grandmother      Social History     Socioeconomic History    Marital status: Single     Spouse name: Not on file    Number of children: Not on file    Years of education: Not on file    Highest education level: Not on file   Occupational History    Occupation: [employer], [employer]     Employer: [employer]   Tobacco Use    Smoking status: Former     Current packs/day: 0.00     Average packs/day: 1 pack/day for 35.9 years (35.9 ttl pk-yrs)     Types: Cigarettes     Start date: 1979     Quit date: 2014     Years since quittin.6    Smokeless tobacco: Never   Vaping Use    Vaping status: Never Used   Substance and Sexual Activity    Alcohol use: Yes      Alcohol/week: 7.0 standard drinks of alcohol     Types: 7 Glasses of wine per week     Comment: 1 glass of wine daily    Drug use: No    Sexual activity: Yes     Partners: Male     Birth control/protection: Condom Male     Comment: defer   Other Topics Concern    Not on file   Social History Narrative    Recreational activities:  Walking     Social Determinants of Health     Financial Resource Strain: Not on file   Food Insecurity: Not on file   Transportation Needs: Not on file   Physical Activity: Not on file   Stress: Not on file   Social Connections: Not on file   Intimate Partner Violence: Not on file   Housing Stability: Not on file     Social History     Tobacco Use   Smoking Status Former    Current packs/day: 0.00    Average packs/day: 1 pack/day for 35.9 years (35.9 ttl pk-yrs)    Types: Cigarettes    Start date: 1979    Quit date: 2014    Years since quittin.6   Smokeless Tobacco Never     Social History     Substance and Sexual Activity   Alcohol Use Yes    Alcohol/week: 7.0 standard drinks of alcohol    Types: 7 Glasses of wine per week    Comment: 1 glass of wine daily       Labs & Results:  Below is the patient's most recent value for Albumin, ALT, AST, BUN, Calcium, Chloride, Cholesterol, CO2, Creatinine, GFR, Glucose, HDL, Hematocrit, Hemoglobin, Hemoglobin A1C, LDL, Magnesium, Phosphorus, Platelets, Potassium, PSA, Sodium, Triglycerides, and WBC.   Lab Results   Component Value Date    ALT 29 2024    AST 20 2024    BUN 14 2024    CALCIUM 9.3 2024     2024    CHOL 154 2015    CO2 26 2024    CREATININE 0.98 2024    HDL 55 2024    HCT 46.6 2024    HGB 15.8 2024    HGBA1C 5.4 2024    MG 1.9 2019     2024    K 4.6 2024    PSA 1.022 2024     2015    TRIG 123 2024    WBC 8.39 2024     Note: for a comprehensive list of the patient's lab results, access the  Results Review activity.            Cardiac testing:   Results for orders placed during the hospital encounter of 19    Echo complete with contrast if indicated    [place] [address]  [phone]    Transthoracic Echocardiogram  2D, M-mode, Doppler, and Color Doppler    Study date:  2019    Patient: VIVIANE CARPENTER  MR number: [id number]  Account number: [id number]  : 1960  Age: 58 years  Gender: Male  Status: Outpatient  Location: [address] [place] [address]  Height: 73 in  Weight: 202 lb  BP: 139/ 79 mmHg    Indications: Atrial flutter    Diagnoses: I48.1 - Atrial flutter    Sonographer:  LAZARO Frank  Interpreting Physician:  Viviane Donis DO  Primary Physician:  Yonatan Holcomb DO  Referring Physician:  Tulio Cardenas DO  Group:  [facility]  cc:  Luis Morrow DO  Cardiology Fellow:  Se Feng MD    SUMMARY    LEFT VENTRICLE:  Systolic function was normal. Ejection fraction was estimated to be 55 %.  There were no regional wall motion abnormalities.    RIGHT VENTRICLE:  The size was normal.  Systolic function was normal.    HISTORY: PRIOR HISTORY: Hypertension    PROCEDURE: The study was performed in the [address] [place] [address]. This was a routine study. The transthoracic approach was used. The study included complete 2D imaging, M-mode, complete spectral Doppler, and color Doppler. The  heart rate was 68 bpm, at the start of the study. Images were obtained from the parasternal, apical, subcostal, and suprasternal notch acoustic windows. Echocardiographic views were limited due to poor acoustic window availability. Image  quality was adequate.    LEFT VENTRICLE: Size was normal. Systolic function was normal. Ejection fraction was estimated to be 55 %. There were no regional wall motion abnormalities. Wall thickness was normal. DOPPLER: Left ventricular diastolic  function parameters  were normal for the patient's age.    RIGHT VENTRICLE: The size was normal. Systolic function was normal. Wall thickness was normal.    LEFT ATRIUM: Size was normal.    RIGHT ATRIUM: Size was normal.    MITRAL VALVE: Valve structure was normal. There was normal leaflet separation. DOPPLER: The transmitral velocity was within the normal range. There was no evidence for stenosis. There was trace regurgitation.    AORTIC VALVE: The valve was trileaflet. Leaflets exhibited mildly increased thickness, mild calcification, and normal cuspal separation. DOPPLER: Transaortic velocity was within the normal range. There was no evidence for stenosis. There  was no significant regurgitation.    TRICUSPID VALVE: The valve structure was normal. There was normal leaflet separation. DOPPLER: The transtricuspid velocity was within the normal range. There was no evidence for stenosis. There was no significant regurgitation.    PULMONIC VALVE: Leaflets exhibited normal thickness, no calcification, and normal cuspal separation. DOPPLER: The transpulmonic velocity was within the normal range. There was no significant regurgitation.    PERICARDIUM: There was no pericardial effusion.    AORTA: The root exhibited normal size.    SYSTEMIC VEINS: IVC: The inferior vena cava was normal in size.    SYSTEM MEASUREMENT TABLES    2D  %FS: 30.2 %  Ao Diam: 3.29 cm  EDV(Teich): 120.65 ml  EF(Cube): 65.99 %  EF(Teich): 57.24 %  ESV(Cube): 43.64 ml  ESV(Teich): 51.59 ml  IVSd: 0.88 cm  LA Area: 17.26 cm2  LA Diam: 3.48 cm  LVEDV MOD A4C: 109.23 ml  LVEF MOD A4C: 49.2 %  LVESV MOD A4C: 55.5 ml  LVIDd: 5.04 cm  LVIDs: 3.52 cm  LVLd A4C: 7.81 cm  LVLs A4C: 6.85 cm  LVPWd: 0.97 cm  RA Area: 13.92 cm2  RV Diam.: 3.75 cm  SV MOD A4C: 53.74 ml  SV(Cube): 84.66 ml  SV(Teich): 69.06 ml    CW  TR Vmax: 2.18 m/s  TR maxP.08 mmHg    MM  TAPSE: 1.98 cm    PW  E': 0.06 m/s  E/E': 8.6  MV A [place]: 0.51 m/s  MV Dec [place]: 1.8 m/s2  MV DecT:  281.82 ms  MV E Moses: 0.51 m/s  MV E/A Ratio: 1    [employer] Accredited Echocardiography Laboratory    Prepared and electronically signed by    Eric Donis DO  Signed 02-Jan-2019 10:03:56    No results found for this or any previous visit.    No results found for this or any previous visit.    No results found for this or any previous visit.

## 2024-07-16 ENCOUNTER — OFFICE VISIT (OUTPATIENT)
Dept: PHYSICAL THERAPY | Facility: MEDICAL CENTER | Age: 64
End: 2024-07-16
Payer: COMMERCIAL

## 2024-07-16 DIAGNOSIS — G89.29 CHRONIC RIGHT-SIDED LOW BACK PAIN WITH RIGHT-SIDED SCIATICA: Primary | ICD-10-CM

## 2024-07-16 DIAGNOSIS — M54.41 CHRONIC RIGHT-SIDED LOW BACK PAIN WITH RIGHT-SIDED SCIATICA: Primary | ICD-10-CM

## 2024-07-16 PROCEDURE — 97110 THERAPEUTIC EXERCISES: CPT

## 2024-07-16 PROCEDURE — 97140 MANUAL THERAPY 1/> REGIONS: CPT

## 2024-07-16 PROCEDURE — 97112 NEUROMUSCULAR REEDUCATION: CPT

## 2024-07-16 NOTE — PROGRESS NOTES
Daily Note     Today's date: 2024  Patient name: Eric Ron  : 1960  MRN: [id number]  Referring provider: Juan Mix*  Dx:   Encounter Diagnosis     ICD-10-CM    1. 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  Pt states that he's been more diligent with performing his hep.       Objective: See treatment diary below      Assessment: Tolerated treatment well. Patient demonstrated fatigue post treatment, exhibited good technique with therapeutic exercises, and would benefit from continued PT  Pt displays improved lumbar mobility since LV.        Plan: Continue per plan of care.      Precautions: afib, hypertension, hyperlipidemia     Manuals 7/3 7/9 7/16          Lumbar PAs  STM  KO STM  KO          PPU with OP   KO                                    Neuro Re-Ed             Supine Sciatic N Glide HEP 2x10 2x10          Seated Sciatic N [place] HEP 2x10 3x10                       TA Brace  2x10 3x10          TA + Bridge  pain 2x10                                    Ther Ex             PPU on Elbows HEP 2x10 2x10          Repeated Extension in Standing HEP 2x10 3x10          Prone Hip Ext  x10 2x10          LTR  2x10 2x10                                                              Ther Activity                                       Gait Training                                       Modalities            MHP  Pre tx  15' Pre tx  15'

## 2024-07-18 ENCOUNTER — OFFICE VISIT (OUTPATIENT)
Dept: PHYSICAL THERAPY | Facility: MEDICAL CENTER | Age: 64
End: 2024-07-18
Payer: COMMERCIAL

## 2024-07-18 DIAGNOSIS — G89.29 CHRONIC RIGHT-SIDED LOW BACK PAIN WITH RIGHT-SIDED SCIATICA: Primary | ICD-10-CM

## 2024-07-18 DIAGNOSIS — M54.41 CHRONIC RIGHT-SIDED LOW BACK PAIN WITH RIGHT-SIDED SCIATICA: Primary | ICD-10-CM

## 2024-07-18 PROCEDURE — 97110 THERAPEUTIC EXERCISES: CPT

## 2024-07-18 PROCEDURE — 97112 NEUROMUSCULAR REEDUCATION: CPT

## 2024-07-18 PROCEDURE — 97140 MANUAL THERAPY 1/> REGIONS: CPT

## 2024-07-18 NOTE — PROGRESS NOTES
Daily Note     Today's date: 2024  Patient name: Eric Ron  : 1960  MRN: [id number]  Referring provider: Juan Mix*  Dx:   Encounter Diagnosis     ICD-10-CM    1. 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       Objective: See treatment diary below      Assessment: Tolerated treatment well. Patient demonstrated fatigue post treatment, exhibited good technique with therapeutic exercises, and would benefit from continued PT  Pt is responding well to manuals and TE program.  Pt has also shown improved lumbar mobility since he's been performing his hep.       Plan: Continue per plan of care.      Precautions: afib, hypertension, hyperlipidemia     Manuals 7/3 7/9 7/16 7/18         Lumbar PAs  STM  KO STM  KO STM  KO         PPU with OP   KO KO                                   Neuro Re-Ed             Supine Sciatic N Glide HEP 2x10 2x10 x30         Seated Sciatic N Glide HEP 2x10 3x10 x30                      TA Brace  2x10 3x10 x30         TA + Bridge  pain 2x10 x30                                   Ther Ex             PPU on Elbows HEP 2x10 2x10 3x10         Repeated Extension in Standing HEP 2x10 3x10 3x10         Prone Hip Ext  x10 2x10 3x10         LTR  2x10 2x10 3x10                                                             Ther Activity                                       Gait Training                                       Modalities           MHP  Pre tx  15' Pre tx  15' Pre tx  15'

## 2024-07-22 ENCOUNTER — OFFICE VISIT (OUTPATIENT)
Dept: PHYSICAL THERAPY | Facility: MEDICAL CENTER | Age: 64
End: 2024-07-22
Payer: COMMERCIAL

## 2024-07-22 DIAGNOSIS — M54.41 CHRONIC RIGHT-SIDED LOW BACK PAIN WITH RIGHT-SIDED SCIATICA: Primary | ICD-10-CM

## 2024-07-22 DIAGNOSIS — G89.29 CHRONIC RIGHT-SIDED LOW BACK PAIN WITH RIGHT-SIDED SCIATICA: Primary | ICD-10-CM

## 2024-07-22 PROCEDURE — 97140 MANUAL THERAPY 1/> REGIONS: CPT

## 2024-07-22 PROCEDURE — 97110 THERAPEUTIC EXERCISES: CPT

## 2024-07-22 PROCEDURE — 97112 NEUROMUSCULAR REEDUCATION: CPT

## 2024-07-22 NOTE — PROGRESS NOTES
Daily Note     Today's date: 2024  Patient name: Eric Ron  : 1960  MRN: [id number]  Referring provider: Juan Mix*  Dx:   Encounter Diagnosis     ICD-10-CM    1. Chronic right-sided low back pain with right-sided sciatica  M54.41     G89.29                      Subjective: Pt reports that he gets back soreness when he has to learn forward for awhile. He reports that he gets the leg pain after getting out of his work car.       Objective: See treatment diary below      Assessment: Discussed adding lumbar roll to low back when driving and reassess symptoms at next session with getting out of car. Tolerated treatment well. Patient demonstrated fatigue post treatment, exhibited good technique with therapeutic exercises, and would benefit from continued PT  Pt is responding well to manuals and TE program.  Pt has also shown improved lumbar mobility since he's been performing his hep.       Plan: Continue per plan of care.      Precautions: afib, hypertension, hyperlipidemia     Manuals 7/3 7/9 7/16 7/18 7/22        Lumbar PAs  STM  KO STM  KO STM  KO [facility]        PPU with OP   KO MAYO [facility]                                  Neuro Re-Ed             Supine Sciatic N Glide HEP 2x10 2x10 x30 x30        Seated Sciatic N Glide HEP 2x10 3x10 x30 x30                     TA Brace  2x10 3x10 x30 x30        TA + Bridge  pain 2x10 x30 X20 hip abd gtb  X20 hip add                                  Ther Ex             PPU on Elbows HEP 2x10 2x10 3x10 3x10        Repeated Extension in Standing HEP 2x10 3x10 3x10 3x10        Prone Hip Ext  x10 2x10 3x10 3x10        LTR  2x10 2x10 3x10 3x10        Standing piriformis stretch     5-10sx20                                               Ther Activity                                       Gait Training                                       Modalities           MHP  Pre tx  15' Pre tx  15' Pre tx  15'

## 2024-07-24 ENCOUNTER — OFFICE VISIT (OUTPATIENT)
Dept: PHYSICAL THERAPY | Facility: MEDICAL CENTER | Age: 64
End: 2024-07-24
Payer: COMMERCIAL

## 2024-07-24 DIAGNOSIS — M54.41 CHRONIC RIGHT-SIDED LOW BACK PAIN WITH RIGHT-SIDED SCIATICA: Primary | ICD-10-CM

## 2024-07-24 DIAGNOSIS — G89.29 CHRONIC RIGHT-SIDED LOW BACK PAIN WITH RIGHT-SIDED SCIATICA: Primary | ICD-10-CM

## 2024-07-24 PROCEDURE — 97140 MANUAL THERAPY 1/> REGIONS: CPT

## 2024-07-24 PROCEDURE — 97112 NEUROMUSCULAR REEDUCATION: CPT

## 2024-07-24 PROCEDURE — 97110 THERAPEUTIC EXERCISES: CPT

## 2024-07-24 NOTE — PROGRESS NOTES
Daily Note     Today's date: 2024  Patient name: Eric Ron  : 1960  MRN: [id number]  Referring provider: Juan Mix*  Dx:   Encounter Diagnosis     ICD-10-CM    1. Chronic right-sided low back pain with right-sided sciatica  M54.41     G89.29                      Subjective: Pt reports that the towel roll helps a litter.       Objective: See treatment diary below      Assessment: Tolerated treatment well. Patient demonstrated fatigue post treatment, exhibited good technique with therapeutic exercises, and would benefit from continued PT  Pt is responding well to manuals and TE program.  Pt has also shown improved lumbar mobility since he's been performing his hep.       Plan: Continue per plan of care.      Precautions: afib, hypertension, hyperlipidemia     Manuals 7/3 7/9 7/16 7/18 7/22 7/24       Lumbar PAs  STM  KO STM  KO STM  KO [facility]       PPU with OP   KO KO [facility]                                 Neuro Re-Ed             Supine Sciatic N Glide HEP 2x10 2x10 x30 x30 x30       Seated Sciatic N Glide HEP 2x10 3x10 x30 x30                     TA Brace  2x10 3x10 x30 x30        TA + Bridge  pain 2x10 x30 X20 hip abd gtb  X20 hip add X20 hip abd gtb  X20 hip add                                 Ther Ex             PPU on Elbows HEP 2x10 2x10 3x10 3x10        Repeated Extension in Standing HEP 2x10 3x10 3x10 3x10 2x10       Prone Hip Ext  x10 2x10 3x10 3x10 2x10       LTR  2x10 2x10 3x10 3x10        Standing piriformis stretch     5-10sx20 10sx10                                              Ther Activity                                       Gait Training                                       Modalities           MHP  Pre tx  15' Pre tx  15' Pre tx  15'

## 2024-07-29 ENCOUNTER — APPOINTMENT (OUTPATIENT)
Dept: PHYSICAL THERAPY | Facility: MEDICAL CENTER | Age: 64
End: 2024-07-29
Payer: COMMERCIAL

## 2024-07-31 ENCOUNTER — HOSPITAL ENCOUNTER (OUTPATIENT)
Dept: RADIOLOGY | Facility: HOSPITAL | Age: 64
Discharge: HOME | End: 2024-07-31
Attending: FAMILY MEDICINE
Payer: COMMERCIAL

## 2024-07-31 ENCOUNTER — OFFICE VISIT (OUTPATIENT)
Dept: PHYSICAL THERAPY | Facility: MEDICAL CENTER | Age: 64
End: 2024-07-31
Payer: COMMERCIAL

## 2024-07-31 DIAGNOSIS — G89.29 CHRONIC RIGHT-SIDED LOW BACK PAIN WITH RIGHT-SIDED SCIATICA: Primary | ICD-10-CM

## 2024-07-31 DIAGNOSIS — M54.41 CHRONIC RIGHT-SIDED LOW BACK PAIN WITH RIGHT-SIDED SCIATICA: Primary | ICD-10-CM

## 2024-07-31 DIAGNOSIS — F17.211 CIGARETTE NICOTINE DEPENDENCE IN REMISSION: ICD-10-CM

## 2024-07-31 PROCEDURE — 97110 THERAPEUTIC EXERCISES: CPT

## 2024-07-31 PROCEDURE — 97112 NEUROMUSCULAR REEDUCATION: CPT

## 2024-07-31 PROCEDURE — 97140 MANUAL THERAPY 1/> REGIONS: CPT

## 2024-07-31 PROCEDURE — 71271 CT THORAX LUNG CANCER SCR C-: CPT

## 2024-07-31 NOTE — PROGRESS NOTES
Daily Note     Today's date: 2024  Patient name: Eric Ron  : 1960  MRN: [id number]  Referring provider: Juan Mix*  Dx:   Encounter Diagnosis     ICD-10-CM    1. Chronic right-sided low back pain with right-sided sciatica  M54.41     G89.29                      Subjective: Pt reports that the towel roll helps a litter.       Objective: See treatment diary below      Assessment: Tolerated treatment well. Patient demonstrated fatigue post treatment, exhibited good technique with therapeutic exercises, and would benefit from continued PT  Pt is responding well to manuals and TE program.  Pt has also shown improved lumbar mobility since he's been performing his hep.       Plan: Continue per plan of care.      Precautions: afib, hypertension, hyperlipidemia     Manuals 7/3 7/9 7/16 7/18 7/22 7/31       Lumbar PAs  STM  KO STM  KO STM  KO [facility]       PPU with OP   KO KO [facility]                                 Neuro Re-Ed             Supine Sciatic N Glide HEP 2x10 2x10 x30 x30 x30       Seated Sciatic N Glide HEP 2x10 3x10 x30 x30                     TA Brace  2x10 3x10 x30 x30        TA + Bridge  pain 2x10 x30 X20 hip abd gtb  X20 hip add X20 hip abd gtb  X20 hip add                                 Ther Ex             PPU on Elbows HEP 2x10 2x10 3x10 3x10        Repeated Extension in Standing HEP 2x10 3x10 3x10 3x10 2x10       Prone Hip Ext  x10 2x10 3x10 3x10 2x10       LTR  2x10 2x10 3x10 3x10        Standing piriformis stretch     5-10sx20 10sx10       Standing hip abd/ext      2x10       Pball roll out      Standing 10x10s                    Ther Activity                                       Gait Training                                       Modalities           MHP  Pre tx  15' Pre tx  15' Pre tx  15'

## 2024-08-05 ENCOUNTER — OFFICE VISIT (OUTPATIENT)
Dept: PHYSICAL THERAPY | Facility: MEDICAL CENTER | Age: 64
End: 2024-08-05
Payer: COMMERCIAL

## 2024-08-05 DIAGNOSIS — M54.41 CHRONIC RIGHT-SIDED LOW BACK PAIN WITH RIGHT-SIDED SCIATICA: Primary | ICD-10-CM

## 2024-08-05 DIAGNOSIS — G89.29 CHRONIC RIGHT-SIDED LOW BACK PAIN WITH RIGHT-SIDED SCIATICA: Primary | ICD-10-CM

## 2024-08-05 PROCEDURE — 97140 MANUAL THERAPY 1/> REGIONS: CPT

## 2024-08-05 PROCEDURE — 97110 THERAPEUTIC EXERCISES: CPT

## 2024-08-05 NOTE — PROGRESS NOTES
Daily Note     Today's date: 2024  Patient name: Eric Ron  : 1960  MRN: [id number]  Referring provider: Juan Mix*  Dx:   Encounter Diagnosis     ICD-10-CM    1. Chronic right-sided low back pain with right-sided sciatica  M54.41     G89.29                      Subjective: Pt reports that he feels good today because it is early in the week.       Objective: See treatment diary below      Assessment: Tolerated treatment well. Patient demonstrated fatigue post treatment, exhibited good technique with therapeutic exercises, and would benefit from continued PT  Pt is responding well to manuals and TE program.  Pt has also shown improved lumbar mobility since he's been performing his hep.       Plan: Continue per plan of care.      Precautions: afib, hypertension, hyperlipidemia     Manuals 7/3 7/9 7/16 7/18 7/22 8/       Lumbar PAs  STM  KO STM  KO STM  KO [facility]       PPU with OP   KO KO [facility]                                 Neuro Re-Ed             Supine Sciatic N Glide HEP 2x10 2x10 x30 x30 x30       Seated Sciatic N Glide HEP 2x10 3x10 x30 x30                     TA Brace  2x10 3x10 x30 x30        TA + Bridge  pain 2x10 x30 X20 hip abd gtb  X20 hip add X20 hip abd gtb  X20 hip add                                 Ther Ex             PPU on Elbows HEP 2x10 2x10 3x10 3x10        Repeated Extension in Standing HEP 2x10 3x10 3x10 3x10 2x10       Prone Hip Ext  x10 2x10 3x10 3x10 2x10       LTR  2x10 2x10 3x10 3x10        Standing piriformis stretch     5-10sx20 10sx10       Standing hip abd/ext      2x10       Pball roll out      Standing 10x10s       clamshells      2x10 gtb                    Ther Activity                                       Gait Training                                       Modalities           MHP  Pre tx  15' Pre tx  15' Pre tx  15'

## 2024-08-07 ENCOUNTER — OFFICE VISIT (OUTPATIENT)
Dept: PHYSICAL THERAPY | Facility: MEDICAL CENTER | Age: 64
End: 2024-08-07
Payer: COMMERCIAL

## 2024-08-07 DIAGNOSIS — G89.29 CHRONIC RIGHT-SIDED LOW BACK PAIN WITH RIGHT-SIDED SCIATICA: Primary | ICD-10-CM

## 2024-08-07 DIAGNOSIS — M54.41 CHRONIC RIGHT-SIDED LOW BACK PAIN WITH RIGHT-SIDED SCIATICA: Primary | ICD-10-CM

## 2024-08-07 PROCEDURE — 97140 MANUAL THERAPY 1/> REGIONS: CPT

## 2024-08-07 PROCEDURE — 97110 THERAPEUTIC EXERCISES: CPT

## 2024-08-07 NOTE — PROGRESS NOTES
Daily Note     Today's date: 2024  Patient name: Eric Ron  : 1960  MRN: [id number]  Referring provider: Juan Mix*  Dx:   Encounter Diagnosis     ICD-10-CM    1. Chronic right-sided low back pain with right-sided sciatica  M54.41     G89.29                      Subjective: Pt reports that he feels good today.       Objective: See treatment diary below      Assessment: Tolerated treatment well. Patient demonstrated fatigue post treatment, exhibited good technique with therapeutic exercises, and would benefit from continued PT  Pt is responding well to manuals and TE program.  Pt has also shown improved lumbar mobility since he's been performing his hep.       Plan: Continue per plan of care.      Precautions: afib, hypertension, hyperlipidemia     Manuals 7/3 7/9 7/16 7/18 7/22 8/7       Lumbar PAs  STM  KO STM  KO STM  KO [facility]       PPU with OP   KO KO [facility]                                 Neuro Re-Ed             Supine Sciatic N Glide HEP 2x10 2x10 x30 x30 x30       Seated Sciatic N Glide HEP 2x10 3x10 x30 x30                     TA Brace  2x10 3x10 x30 x30        TA + Bridge  pain 2x10 x30 X20 hip abd gtb  X20 hip add X20 hip abd gtb  X20 hip add                                 Ther Ex             PPU on Elbows HEP 2x10 2x10 3x10 3x10        Repeated Extension in Standing HEP 2x10 3x10 3x10 3x10 2x10       Prone Hip Ext  x10 2x10 3x10 3x10 2x10       LTR  2x10 2x10 3x10 3x10        Standing piriformis stretch     5-10sx20 10sx10       Standing hip abd/ext      2x10       Pball roll out      Standing 10x10s       clamshells      2x10 gtb       Reverse clamshells      2x10                    Ther Activity                                       Gait Training                                       Modalities           MHP  Pre tx  15' Pre tx  15' Pre tx  15'

## 2024-08-12 ENCOUNTER — OFFICE VISIT (OUTPATIENT)
Dept: PHYSICAL THERAPY | Facility: MEDICAL CENTER | Age: 64
End: 2024-08-12
Payer: COMMERCIAL

## 2024-08-12 DIAGNOSIS — M54.41 CHRONIC RIGHT-SIDED LOW BACK PAIN WITH RIGHT-SIDED SCIATICA: Primary | ICD-10-CM

## 2024-08-12 DIAGNOSIS — G89.29 CHRONIC RIGHT-SIDED LOW BACK PAIN WITH RIGHT-SIDED SCIATICA: Primary | ICD-10-CM

## 2024-08-12 PROCEDURE — 97110 THERAPEUTIC EXERCISES: CPT

## 2024-08-12 PROCEDURE — 97140 MANUAL THERAPY 1/> REGIONS: CPT

## 2024-08-12 NOTE — PROGRESS NOTES
Daily Note     Today's date: 2024  Patient name: Eric Ron  : 1960  MRN: [id number]  Referring provider: Juan Mix*  Dx:   Encounter Diagnosis     ICD-10-CM    1. Chronic right-sided low back pain with right-sided sciatica  M54.41     G89.29                      Subjective: Pt reports that he felt good this weekend [facility] He reports that he mowed the lawn on  and has had pain since.       Objective: See treatment diary below      Assessment: Tolerated treatment well. Patient demonstrated fatigue post treatment, exhibited good technique with therapeutic exercises, and would benefit from continued PT  Pt is responding well to manuals and TE program.  Pt has also shown improved lumbar mobility since he's been performing his hep.       Plan: Continue per plan of care.      Precautions: afib, hypertension, hyperlipidemia     Manuals 7/3 7/9 7/16 7/18 7/22 8/12       Lumbar PAs  STM  KO STM  KO STM  KO  [facility]       PPU with OP   KO KO [facility]                                 Neuro Re-Ed             Supine Sciatic N Glide HEP 2x10 2x10 x30 x30 x30       Seated Sciatic N Glide HEP 2x10 3x10 x30 x30                     TA Brace  2x10 3x10 x30 x30        TA + Bridge  pain 2x10 x30 X20 hip abd gtb  X20 hip add X20 hip abd gtb  X20 hip add                                 Ther Ex             PPU on Elbows HEP 2x10 2x10 3x10 3x10        Repeated Extension in Standing HEP 2x10 3x10 3x10 3x10 2x10       Prone Hip Ext  x10 2x10 3x10 3x10 2x10       LTR  2x10 2x10 3x10 3x10        Standing piriformis stretch     5-10sx20 10sx10       Standing hip abd/ext      2x10       Pball roll out      Standing 10x10s       clamshells      2x10 gtb       Reverse clamshells      2x10                    Ther Activity                                       Gait Training                                       Modalities           MHP  Pre tx  15' Pre tx  15' Pre tx  15'

## 2024-08-14 ENCOUNTER — OFFICE VISIT (OUTPATIENT)
Dept: PHYSICAL THERAPY | Facility: MEDICAL CENTER | Age: 64
End: 2024-08-14
Payer: COMMERCIAL

## 2024-08-14 DIAGNOSIS — M54.41 CHRONIC RIGHT-SIDED LOW BACK PAIN WITH RIGHT-SIDED SCIATICA: Primary | ICD-10-CM

## 2024-08-14 DIAGNOSIS — G89.29 CHRONIC RIGHT-SIDED LOW BACK PAIN WITH RIGHT-SIDED SCIATICA: Primary | ICD-10-CM

## 2024-08-14 PROCEDURE — 97140 MANUAL THERAPY 1/> REGIONS: CPT

## 2024-08-14 PROCEDURE — 97110 THERAPEUTIC EXERCISES: CPT

## 2024-08-14 NOTE — PROGRESS NOTES
Daily Note     Today's date: 2024  Patient name: Eric Ron  : 1960  MRN: [id number]  Referring provider: Juan Mix*  Dx:   Encounter Diagnosis     ICD-10-CM    1. Chronic right-sided low back pain with right-sided sciatica  M54.41     G89.29                      Subjective: Pt reports that he had pain after last session that has gotten better.       Objective: See treatment diary below      Assessment: Trialed traction pre-session and followed up with mobilizations and therapeutic exercise.  Tolerated treatment well. Patient demonstrated fatigue post treatment, exhibited good technique with therapeutic exercises, and would benefit from continued PT  Pt is responding well to manuals and TE program.  Pt has also shown improved lumbar mobility since he's been performing his hep.       Plan: Continue per plan of care.      Precautions: afib, hypertension, hyperlipidemia     Manuals 7/3 7/9 7/16 7/18 7/22 8/14       Lumbar PAs  STM  KO STM  KO STM  KO [facility]       PPU with OP   KO KO [facility]                                 Neuro Re-Ed             Supine Sciatic N Glide HEP 2x10 2x10 x30 x30 x30       Seated Sciatic N Glide HEP 2x10 3x10 x30 x30                     TA Brace  2x10 3x10 x30 x30        TA + Bridge  pain 2x10 x30 X20 hip abd gtb  X20 hip add X20 hip abd gtb  X20 hip add                                 Ther Ex             PPU on Elbows HEP 2x10 2x10 3x10 3x10        Repeated Extension in Standing HEP 2x10 3x10 3x10 3x10 2x10       Prone Hip Ext  x10 2x10 3x10 3x10 2x10       LTR  2x10 2x10 3x10 3x10        Standing piriformis stretch     5-10sx20 10sx10       Standing hip abd/ext      2x10       Pball roll out      Standing 10x10s       clamshells      2x10 gtb       Reverse clamshells      2x10                    Ther Activity                                       Gait Training                                       Modalities           MHP  Pre tx  15' Pre tx  15'  Pre tx  15'         tx      Prone 105/5# 30/5s int

## 2024-08-19 ENCOUNTER — OFFICE VISIT (OUTPATIENT)
Dept: PHYSICAL THERAPY | Facility: MEDICAL CENTER | Age: 64
End: 2024-08-19
Payer: COMMERCIAL

## 2024-08-19 DIAGNOSIS — G89.29 CHRONIC RIGHT-SIDED LOW BACK PAIN WITH RIGHT-SIDED SCIATICA: Primary | ICD-10-CM

## 2024-08-19 DIAGNOSIS — M54.41 CHRONIC RIGHT-SIDED LOW BACK PAIN WITH RIGHT-SIDED SCIATICA: Primary | ICD-10-CM

## 2024-08-19 PROCEDURE — 97110 THERAPEUTIC EXERCISES: CPT

## 2024-08-19 PROCEDURE — 97140 MANUAL THERAPY 1/> REGIONS: CPT

## 2024-08-19 NOTE — PROGRESS NOTES
Daily Note     Today's date: 2024  Patient name: Eric Ron  : 1960  MRN: [id number]  Referring provider: Juan Mix*  Dx:   Encounter Diagnosis     ICD-10-CM    1. Chronic right-sided low back pain with right-sided sciatica  M54.41     G89.29                      Subjective: Pt reports that he did a lot of yardwork Friday and was sore. He reports that he felt good over the weekend. He reports that he feels like traction helped.       Objective: See treatment diary below      Assessment: Performed traction pre-session and followed up with mobilizations and therapeutic exercise again based on patient positive symptom response after last session.  Tolerated treatment well. Patient demonstrated fatigue post treatment, exhibited good technique with therapeutic exercises, and would benefit from continued PT  Pt is responding well to manuals and TE program.  Pt has also shown improved lumbar mobility since he's been performing his hep.       Plan: Continue per plan of care.      Precautions: afib, hypertension, hyperlipidemia     Manuals 7/3 7/9 7/16 7/18 7/22 8/19       Lumbar PAs  STM  KO STM  KO STM  KO [facility]       PPU with OP   KO KO [facility]                                 Neuro Re-Ed             Supine Sciatic N Glide HEP 2x10 2x10 x30 x30 x30       Seated Sciatic N Glide HEP 2x10 3x10 x30 x30                     TA Brace  2x10 3x10 x30 x30        TA + Bridge  pain 2x10 x30 X20 hip abd gtb  X20 hip add X20 hip abd gtb  X20 hip add                                 Ther Ex             PPU on Elbows HEP 2x10 2x10 3x10 3x10        Repeated Extension in Standing HEP 2x10 3x10 3x10 3x10 2x10       Prone Hip Ext  x10 2x10 3x10 3x10 2x10       LTR  2x10 2x10 3x10 3x10        Standing piriformis stretch     5-10sx20 10sx10       Standing hip abd/ext      2x10       Pball roll out      Standing 10x10s       clamshells      2x10 gtb       Reverse clamshells      2x10                    Ther Activity                                        Gait Training                                       Modalities  7/9 7/16 7/18         MHP  Pre tx  15' Pre tx  15' Pre tx  15'         tx      Prone 105/5# 30/5s int

## 2024-08-21 ENCOUNTER — OFFICE VISIT (OUTPATIENT)
Dept: PHYSICAL THERAPY | Facility: MEDICAL CENTER | Age: 64
End: 2024-08-21
Payer: COMMERCIAL

## 2024-08-21 DIAGNOSIS — M54.41 CHRONIC RIGHT-SIDED LOW BACK PAIN WITH RIGHT-SIDED SCIATICA: Primary | ICD-10-CM

## 2024-08-21 DIAGNOSIS — G89.29 CHRONIC RIGHT-SIDED LOW BACK PAIN WITH RIGHT-SIDED SCIATICA: Primary | ICD-10-CM

## 2024-08-21 PROCEDURE — 97112 NEUROMUSCULAR REEDUCATION: CPT

## 2024-08-21 PROCEDURE — 97140 MANUAL THERAPY 1/> REGIONS: CPT

## 2024-08-21 PROCEDURE — 97110 THERAPEUTIC EXERCISES: CPT

## 2024-08-21 NOTE — PROGRESS NOTES
Daily Note     Today's date: 2024  Patient name: Eric Ron  : 1960  MRN: [id number]  Referring provider: Juan Mix*  Dx:   Encounter Diagnosis     ICD-10-CM    1. Chronic right-sided low back pain with right-sided sciatica  M54.41     G89.29                      Subjective: Pt reports that this is the best he has felt in awhile.       Objective: See treatment diary below      Assessment: Performed traction pre-session and followed up with mobilizations and therapeutic exercise again based on patient positive symptom response after last session.  Tolerated treatment well. Patient demonstrated fatigue post treatment, exhibited good technique with therapeutic exercises, and would benefit from continued PT  Pt is responding well to manuals and TE program.  Pt has also shown improved lumbar mobility since he's been performing his hep.       Plan: Continue per plan of care.  Potential d/c next week     Precautions: afib, hypertension, hyperlipidemia     Manuals 7/3 7/9 7/16 7/18 7/22 8/21       Lumbar PAs  STM  KO STM  KO STM  KO [facility]       PPU with OP   KO MAYO [facility]                                 Neuro Re-Ed             Supine Sciatic N Glide HEP 2x10 2x10 x30 x30 x30       Seated Sciatic N Glide HEP 2x10 3x10 x30 x30                     TA Brace  2x10 3x10 x30 x30        TA + Bridge  pain 2x10 x30 X20 hip abd gtb  X20 hip add X20 hip abd gtb  X20 hip add                                 Ther Ex             PPU on Elbows HEP 2x10 2x10 3x10 3x10        Repeated Extension in Standing HEP 2x10 3x10 3x10 3x10 2x10       Prone Hip Ext  x10 2x10 3x10 3x10 2x10       LTR  2x10 2x10 3x10 3x10        Standing piriformis stretch     5-10sx20 10sx10       Standing hip abd/ext      2x10       Pball roll out      Standing 10x10s       clamshells      2x10 gtb       Reverse clamshells      2x10                    Ther Activity                                       Gait Training                                        Modalities  7/9 7/16 7/18         MHP  Pre tx  15' Pre tx  15' Pre tx  15'         tx      Prone 105/5# 30/5s int

## 2024-08-26 ENCOUNTER — OFFICE VISIT (OUTPATIENT)
Dept: PHYSICAL THERAPY | Facility: MEDICAL CENTER | Age: 64
End: 2024-08-26
Payer: COMMERCIAL

## 2024-08-26 DIAGNOSIS — G89.29 CHRONIC RIGHT-SIDED LOW BACK PAIN WITH RIGHT-SIDED SCIATICA: Primary | ICD-10-CM

## 2024-08-26 DIAGNOSIS — M54.41 CHRONIC RIGHT-SIDED LOW BACK PAIN WITH RIGHT-SIDED SCIATICA: Primary | ICD-10-CM

## 2024-08-26 PROCEDURE — 97112 NEUROMUSCULAR REEDUCATION: CPT

## 2024-08-26 PROCEDURE — 97110 THERAPEUTIC EXERCISES: CPT

## 2024-08-26 PROCEDURE — 97140 MANUAL THERAPY 1/> REGIONS: CPT

## 2024-08-26 NOTE — PROGRESS NOTES
Daily Note     Today's date: 2024  Patient name: Eric Ron  : 1960  MRN: [id number]  Referring provider: Juan Mix*  Dx:   Encounter Diagnosis     ICD-10-CM    1. Chronic right-sided low back pain with right-sided sciatica  M54.41     G89.29                      Subjective: Pt reports that he feels much better.       Objective: See treatment diary below      Assessment: Potential d/c NV.  Tolerated treatment well. Patient demonstrated fatigue post treatment, exhibited good technique with therapeutic exercises, and would benefit from continued PT        Plan: Continue per plan of care.  Potential d/c next visit     Precautions: afib, hypertension, hyperlipidemia     Manuals 7/3 7/9 7/16 7/18 7/22 8/26       Lumbar PAs  STM  KO STM  KO STM  KO [facility]       PPU with OP   KO KO [facility]                                 Neuro Re-Ed             Supine Sciatic N Glide HEP 2x10 2x10 x30 x30 x30       Seated Sciatic N Glide HEP 2x10 3x10 x30 x30                     TA Brace  2x10 3x10 x30 x30        TA + Bridge  pain 2x10 x30 X20 hip abd gtb  X20 hip add X20 hip abd gtb  X20 hip add                                 Ther Ex             PPU on Elbows HEP 2x10 2x10 3x10 3x10        Repeated Extension in Standing HEP 2x10 3x10 3x10 3x10 2x10       Prone Hip Ext  x10 2x10 3x10 3x10 2x10       LTR  2x10 2x10 3x10 3x10        Standing piriformis stretch     5-10sx20 10sx10       Standing hip abd/ext      2x10       Pball roll out      Standing 10x10s       clamshells      2x10 gtb       Reverse clamshells      2x10                    Ther Activity                                       Gait Training                                       Modalities           MHP  Pre tx  15' Pre tx  15' Pre tx  15'         tx      Prone 105/5# 30/5s int

## 2024-08-28 ENCOUNTER — OFFICE VISIT (OUTPATIENT)
Dept: PHYSICAL THERAPY | Facility: MEDICAL CENTER | Age: 64
End: 2024-08-28
Payer: COMMERCIAL

## 2024-08-28 DIAGNOSIS — M54.41 CHRONIC RIGHT-SIDED LOW BACK PAIN WITH RIGHT-SIDED SCIATICA: Primary | ICD-10-CM

## 2024-08-28 DIAGNOSIS — G89.29 CHRONIC RIGHT-SIDED LOW BACK PAIN WITH RIGHT-SIDED SCIATICA: Primary | ICD-10-CM

## 2024-08-28 PROCEDURE — 97110 THERAPEUTIC EXERCISES: CPT

## 2024-08-28 PROCEDURE — 97140 MANUAL THERAPY 1/> REGIONS: CPT

## 2024-08-28 NOTE — PROGRESS NOTES
Daily Note     Today's date: 2024  Patient name: Eric Ron  : 1960  MRN: [id number]  Referring provider: Juan Mix*  Dx:   Encounter Diagnosis     ICD-10-CM    1. Chronic right-sided low back pain with right-sided sciatica  M54.41     G89.29                        Subjective: Pt reports that he feels much better.       Objective: See treatment diary below      Assessment: Eric Ron has been compliant with attending PT and home exercise program since initial eval.  Eric  has made improvements in objective data since initial evalulation and has achieved all goals.  Patient reports having returned to their prior level or function. Patient provided with updated Home Exercise Program, all questions answered, verbalized understanding and agreement to plan of care. Thus it was mutually decided to discontinue this episode of care and transition to Home Exercise Program.      Plan: Discharge to [facility].      Precautions: afib, hypertension, hyperlipidemia     Manuals 7/3 7/9 7/16 7/18 7/22 8/28       Lumbar PAs  STM  KO STM  KO STM  KO [facility]       PPU with OP   KO MAYO [facility]                                 Neuro Re-Ed             Supine Sciatic N Glide HEP 2x10 2x10 x30 x30 x30       Seated Sciatic N Glide HEP 2x10 3x10 x30 x30                     TA Brace  2x10 3x10 x30 x30        TA + Bridge  pain 2x10 x30 X20 hip abd gtb  X20 hip add X20 hip abd gtb  X20 hip add                                 Ther Ex             PPU on Elbows HEP 2x10 2x10 3x10 3x10        Repeated Extension in Standing HEP 2x10 3x10 3x10 3x10 2x10       Prone Hip Ext  x10 2x10 3x10 3x10 2x10       LTR  2x10 2x10 3x10 3x10        Standing piriformis stretch     5-10sx20 10sx10       Standing hip abd/ext      2x10       Pball roll out      Standing 10x10s       clamshells      2x10 gtb       Reverse clamshells      2x10                    Ther Activity                                       Gait Training                                        Modalities  7/9 7/16 7/18         MHP  Pre tx  15' Pre tx  15' Pre tx  15'         tx      Prone 105/5# 30/5s int

## 2024-10-02 ENCOUNTER — OFFICE VISIT (OUTPATIENT)
Dept: FAMILY MEDICINE CLINIC | Facility: CLINIC | Age: 64
End: 2024-10-02
Payer: COMMERCIAL

## 2024-10-02 VITALS
SYSTOLIC BLOOD PRESSURE: 144 MMHG | HEART RATE: 86 BPM | BODY MASS INDEX: 29.7 KG/M2 | DIASTOLIC BLOOD PRESSURE: 92 MMHG | WEIGHT: 219 LBS | TEMPERATURE: 97.9 F | OXYGEN SATURATION: 96 %

## 2024-10-02 DIAGNOSIS — I10 ESSENTIAL HYPERTENSION: Primary | ICD-10-CM

## 2024-10-02 DIAGNOSIS — N62 GYNECOMASTIA: ICD-10-CM

## 2024-10-02 DIAGNOSIS — N63.22 MASS OF UPPER INNER QUADRANT OF LEFT BREAST: ICD-10-CM

## 2024-10-02 PROCEDURE — 99214 OFFICE O/P EST MOD 30 MIN: CPT | Performed by: FAMILY MEDICINE

## 2024-10-02 RX ORDER — HYDROCHLOROTHIAZIDE 12.5 MG/1
12.5 TABLET ORAL DAILY
Qty: 30 TABLET | Refills: 2 | Status: SHIPPED | OUTPATIENT
Start: 2024-10-02

## 2024-10-02 NOTE — PROGRESS NOTES
Pennie from Dr. Mix's office called to schedule CESM and Left Axilla Diagnostic Ultrasound. She allowed me to speak to Freddy who was at office in order to do medical review and explanation of CESM (including hydration, IV site , contrast and restriction of deodorant and lotions/powder) and appt at [facility]. Freddy verbalized understanding of all and he will be having his required lab work on 10/5/24. He is scheduled 10/9/24 at 0830 with arrival time of 0800.

## 2024-10-02 NOTE — PROGRESS NOTES
Ambulatory Visit  Name: Eric Ron      : 1960      MRN: [id number]  Encounter Provider: Juan Mix MD  Encounter Date: 10/2/2024   Encounter department: [facility]    Assessment & Plan  Mass of upper inner quadrant of left breast  Check mammogram and US, follow up after results, small area of firmness palapted, left breast mild gynecomastia right normal.    Orders:    US Breast Axilla Left; Future    Essential hypertension  Uncontrolled add hctz, patient reports eleavted at home as well  Orders:    hydroCHLOROthiazide 12.5 mg tablet; Take 1 tablet (12.5 mg total) by mouth daily    Gynecomastia    Orders:    Mammo Contrast Enhanced Diag Bilateral; Future    TSH, 3rd generation with Free T4 reflex; Future    Prolactin; Future    Testosterone; Future    CBC and differential; Future    Comprehensive metabolic panel; Future    Estradiol; Future       History of Present Illness     HPI    63 year old male with pmh of htn presenting for six weeks of left breast swelling and palpalbe mass above nipple on left side.    Notes mild tenderness.    No new medications or supplemetns, otherwise feeling well, notes BP elevated at home, under stress has sister recently passed away.      Review of Systems   Constitutional:  Negative for activity change and appetite change.   Respiratory:  Negative for apnea and chest tightness.    Cardiovascular:  Negative for chest pain and palpitations.   Gastrointestinal:  Negative for abdominal distention and abdominal pain.   Musculoskeletal:  Negative for arthralgias and back pain.           Objective     /92 (BP Location: Left arm, Patient Position: Sitting, Cuff Size: Large)   Pulse 86   Temp 97.9 °F (36.6 °C) (Tympanic)   Wt 99.3 kg (219 lb)   SpO2 96%   BMI 29.70 kg/m²     Physical Exam  Constitutional:       Appearance: Normal appearance.   Cardiovascular:      Rate and Rhythm: Normal rate and regular rhythm.       Pulses: Normal pulses.      Heart sounds: Normal heart sounds.   Pulmonary:      Effort: Pulmonary effort is normal.   Chest:      Chest wall: Mass present.   Breasts:     Breasts are asymmetrical.          Comments: Small area of firmness where circled, mild tenderness, slight gynecomastia left side  Abdominal:      General: Abdomen is flat.   Lymphadenopathy:      Upper Body:      Right upper body: No axillary or pectoral adenopathy.      Left upper body: No axillary or pectoral adenopathy.   Neurological:      Mental Status: He is alert.

## 2024-10-02 NOTE — ASSESSMENT & PLAN NOTE
Uncontrolled add hctz, patient reports eleavted at home as well  Orders:    hydroCHLOROthiazide 12.5 mg tablet; Take 1 tablet (12.5 mg total) by mouth daily

## 2024-10-03 ENCOUNTER — APPOINTMENT (OUTPATIENT)
Dept: LAB | Facility: CLINIC | Age: 64
End: 2024-10-03
Payer: COMMERCIAL

## 2024-10-03 DIAGNOSIS — N62 GYNECOMASTIA: ICD-10-CM

## 2024-10-03 LAB
ALBUMIN SERPL BCG-MCNC: 4.2 G/DL (ref 3.5–5)
ALP SERPL-CCNC: 70 U/L (ref 34–104)
ALT SERPL W P-5'-P-CCNC: 27 U/L (ref 7–52)
ANION GAP SERPL CALCULATED.3IONS-SCNC: 12 MMOL/L (ref 4–13)
AST SERPL W P-5'-P-CCNC: 18 U/L (ref 13–39)
BASOPHILS # BLD AUTO: 0.05 THOUSANDS/ΜL (ref 0–0.1)
BASOPHILS NFR BLD AUTO: 1 % (ref 0–1)
BILIRUB SERPL-MCNC: 0.67 MG/DL (ref 0.2–1)
BUN SERPL-MCNC: 18 MG/DL (ref 5–25)
CALCIUM SERPL-MCNC: 9 MG/DL (ref 8.4–10.2)
CHLORIDE SERPL-SCNC: 104 MMOL/L (ref 96–108)
CO2 SERPL-SCNC: 24 MMOL/L (ref 21–32)
CREAT SERPL-MCNC: 0.98 MG/DL (ref 0.6–1.3)
EOSINOPHIL # BLD AUTO: 0.07 THOUSAND/ΜL (ref 0–0.61)
EOSINOPHIL NFR BLD AUTO: 1 % (ref 0–6)
ERYTHROCYTE [DISTWIDTH] IN BLOOD BY AUTOMATED COUNT: 13.2 % (ref 11.6–15.1)
ESTRADIOL SERPL-MCNC: 24.9 PG/ML
GFR SERPL CREATININE-BSD FRML MDRD: 81 ML/MIN/1.73SQ M
GLUCOSE P FAST SERPL-MCNC: 96 MG/DL (ref 65–99)
HCT VFR BLD AUTO: 42.7 % (ref 36.5–49.3)
HGB BLD-MCNC: 14.8 G/DL (ref 12–17)
IMM GRANULOCYTES # BLD AUTO: 0.02 THOUSAND/UL (ref 0–0.2)
IMM GRANULOCYTES NFR BLD AUTO: 0 % (ref 0–2)
LYMPHOCYTES # BLD AUTO: 2.35 THOUSANDS/ΜL (ref 0.6–4.47)
LYMPHOCYTES NFR BLD AUTO: 39 % (ref 14–44)
MCH RBC QN AUTO: 33.6 PG (ref 26.8–34.3)
MCHC RBC AUTO-ENTMCNC: 34.7 G/DL (ref 31.4–37.4)
MCV RBC AUTO: 97 FL (ref 82–98)
MONOCYTES # BLD AUTO: 0.47 THOUSAND/ΜL (ref 0.17–1.22)
MONOCYTES NFR BLD AUTO: 8 % (ref 4–12)
NEUTROPHILS # BLD AUTO: 3.11 THOUSANDS/ΜL (ref 1.85–7.62)
NEUTS SEG NFR BLD AUTO: 51 % (ref 43–75)
NRBC BLD AUTO-RTO: 0 /100 WBCS
PLATELET # BLD AUTO: 228 THOUSANDS/UL (ref 149–390)
PMV BLD AUTO: 9.7 FL (ref 8.9–12.7)
POTASSIUM SERPL-SCNC: 4.4 MMOL/L (ref 3.5–5.3)
PROLACTIN SERPL-MCNC: 5.46 NG/ML
PROT SERPL-MCNC: 7.3 G/DL (ref 6.4–8.4)
RBC # BLD AUTO: 4.4 MILLION/UL (ref 3.88–5.62)
SODIUM SERPL-SCNC: 140 MMOL/L (ref 135–147)
TESTOST SERPL-MSCNC: 209 NG/DL
TSH SERPL DL<=0.05 MIU/L-ACNC: 1.8 UIU/ML (ref 0.45–4.5)
WBC # BLD AUTO: 6.07 THOUSAND/UL (ref 4.31–10.16)

## 2024-10-03 PROCEDURE — 36415 COLL VENOUS BLD VENIPUNCTURE: CPT

## 2024-10-03 PROCEDURE — 80053 COMPREHEN METABOLIC PANEL: CPT

## 2024-10-03 PROCEDURE — 84403 ASSAY OF TOTAL TESTOSTERONE: CPT

## 2024-10-03 PROCEDURE — 85025 COMPLETE CBC W/AUTO DIFF WBC: CPT

## 2024-10-03 PROCEDURE — 84443 ASSAY THYROID STIM HORMONE: CPT

## 2024-10-03 PROCEDURE — 84146 ASSAY OF PROLACTIN: CPT

## 2024-10-03 PROCEDURE — 82670 ASSAY OF TOTAL ESTRADIOL: CPT

## 2024-10-07 ENCOUNTER — TELEPHONE (OUTPATIENT)
Dept: MAMMOGRAPHY | Facility: CLINIC | Age: 64
End: 2024-10-07

## 2024-10-07 ENCOUNTER — TELEPHONE (OUTPATIENT)
Age: 64
End: 2024-10-07

## 2024-10-07 NOTE — TELEPHONE ENCOUNTER
Lola with the [facility] beds at the [facility] called and stated that they will do all the imaging for the mammogram without contrast for the patient. The radiologist will determine at that time if contrast is needed.    If you have any questions please feel free to call Lola at .    Thank you

## 2024-10-07 NOTE — TELEPHONE ENCOUNTER
Ni w/[facility] called requesting to speak w/someone in clinical about Dr. Mix.    Unable to connect w/clinical for warm transfer.    Please call Ni back at [phone]

## 2024-10-09 ENCOUNTER — HOSPITAL ENCOUNTER (OUTPATIENT)
Dept: MAMMOGRAPHY | Facility: CLINIC | Age: 64
Discharge: HOME | End: 2024-10-09
Payer: COMMERCIAL

## 2024-10-09 ENCOUNTER — HOSPITAL ENCOUNTER (OUTPATIENT)
Dept: ULTRASOUND IMAGING | Facility: CLINIC | Age: 64
Discharge: HOME | End: 2024-10-09
Payer: COMMERCIAL

## 2024-10-09 VITALS — BODY MASS INDEX: 29.66 KG/M2 | HEIGHT: 72 IN | WEIGHT: 219 LBS

## 2024-10-09 DIAGNOSIS — Z87.898 HX OF BREAST LUMP: ICD-10-CM

## 2024-10-09 DIAGNOSIS — N63.22 MASS OF UPPER INNER QUADRANT OF LEFT BREAST: ICD-10-CM

## 2024-10-09 PROCEDURE — G0279 TOMOSYNTHESIS, MAMMO: HCPCS

## 2024-10-09 PROCEDURE — 77066 DX MAMMO INCL CAD BI: CPT

## 2024-10-09 PROCEDURE — 76642 ULTRASOUND BREAST LIMITED: CPT

## 2024-12-05 PROBLEM — N63.20 BREAST MASS, LEFT: Status: ACTIVE | Noted: 2024-12-05

## 2024-12-05 PROBLEM — Z01.818 PRE-OP EXAM: Status: RESOLVED | Noted: 2023-09-21 | Resolved: 2024-12-05

## 2024-12-05 NOTE — ASSESSMENT & PLAN NOTE
Recent mammogram along with ultrasound were benign -continue to observe, slight tenderness at area on exam.  Call if worsens/enlarges

## 2024-12-06 ENCOUNTER — OFFICE VISIT (OUTPATIENT)
Dept: FAMILY MEDICINE CLINIC | Facility: CLINIC | Age: 64
End: 2024-12-06
Payer: MEDICARE

## 2024-12-06 VITALS
HEIGHT: 72 IN | WEIGHT: 221.2 LBS | DIASTOLIC BLOOD PRESSURE: 90 MMHG | OXYGEN SATURATION: 97 % | TEMPERATURE: 97.5 F | HEART RATE: 90 BPM | BODY MASS INDEX: 29.96 KG/M2 | SYSTOLIC BLOOD PRESSURE: 148 MMHG | RESPIRATION RATE: 16 BRPM

## 2024-12-06 DIAGNOSIS — I48.92 ATRIAL FLUTTER, UNSPECIFIED TYPE (HCC): ICD-10-CM

## 2024-12-06 DIAGNOSIS — N63.20 MASS OF LEFT BREAST, UNSPECIFIED QUADRANT: ICD-10-CM

## 2024-12-06 DIAGNOSIS — I10 ESSENTIAL HYPERTENSION: Primary | ICD-10-CM

## 2024-12-06 PROCEDURE — 99213 OFFICE O/P EST LOW 20 MIN: CPT | Performed by: FAMILY MEDICINE

## 2024-12-06 PROCEDURE — G2211 COMPLEX E/M VISIT ADD ON: HCPCS | Performed by: FAMILY MEDICINE

## 2024-12-06 RX ORDER — HYDROCHLOROTHIAZIDE 12.5 MG/1
12.5 TABLET ORAL EVERY MORNING
Qty: 90 TABLET | Refills: 1 | Status: SHIPPED | OUTPATIENT
Start: 2024-12-06

## 2024-12-06 RX ORDER — DILTIAZEM HYDROCHLORIDE 240 MG/1
240 CAPSULE, COATED, EXTENDED RELEASE ORAL EVERY MORNING
Qty: 90 CAPSULE | Refills: 1 | Status: SHIPPED | OUTPATIENT
Start: 2024-12-06

## 2024-12-06 NOTE — ASSESSMENT & PLAN NOTE
He looks well here today, has been walking 2-1/2 to 3 miles daily.  He retired in November 1.    Blood pressure remains not ideal here along with at home.    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  He will continue losartan 100 mg at night.    I would like him to redo BMP again in January, update us regarding blood pressure at that time, call sooner if needed.    October blood work showed creatinine to be fine at 0.98 with potassium 4.4, glucose was fine at 96, TSH was okay 1.8, CBC was okay.      Orders:    diltiazem (CARDIZEM CD) 240 mg 24 hr capsule; Take 1 capsule (240 mg total) by mouth every morning    hydroCHLOROthiazide 12.5 mg tablet; Take 1 tablet (12.5 mg total) by mouth every morning    Basic metabolic panel    Urinalysis with microscopic      Cholesterol earlier in the year was excellent at 152 with HDL 55, LDL 72

## 2024-12-06 NOTE — PROGRESS NOTES
Name: Eric Ron      : 1960      MRN: [id number]  Encounter Provider: Yonatan Holcomb DO  Encounter Date: 2024   [facility]  :  Assessment & Plan  Essential hypertension  He looks well here today, has been walking 2-1/2 to 3 miles daily.  He retired in .    Blood pressure remains not ideal here along with at home.    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  He will continue losartan 100 mg at night.    I would like him to redo BMP again in January, update us regarding blood pressure at that time, call sooner if needed.    October blood work showed creatinine to be fine at 0.98 with potassium 4.4, glucose was fine at 96, TSH was okay 1.8, CBC was okay.      Orders:    diltiazem (CARDIZEM CD) 240 mg 24 hr capsule; Take 1 capsule (240 mg total) by mouth every morning    hydroCHLOROthiazide 12.5 mg tablet; Take 1 tablet (12.5 mg total) by mouth every morning    Basic metabolic panel    Urinalysis with microscopic      Cholesterol earlier in the year was excellent at 152 with HDL 55, LDL 72  Mass of left breast, unspecified quadrant  Recent mammogram along with ultrasound were benign -continue to observe, slight tenderness at area on exam.  Call if worsens/enlarges         Atrial flutter, unspecified type (HCC)  He saw electrophysiologist over the summer, has had no issues with increased palpitations/fluid retention, is on diltiazem which is increasing to 240 mg daily today, heart rate at home has been running in the 80s to 90 range.  Continue to monitor.    He is on Eliquis without bleeding  Orders:    diltiazem (CARDIZEM CD) 240 mg 24 hr capsule; Take 1 capsule (240 mg total) by mouth every morning      He will have an annual wellness in , await updates  in January.  We will see him sooner if blood pressure still not controlled     History of Present Illness     He is in to recheck his blood pressure, blood pressure has been running a bit high at home.  He feels well, walks routinely, has had no chest pain, no increased palpitations, no lightheadedness, no increased shortness of breath.  He is using medication as directed, has had no bleeding with Eliquis.    Had been seen regarding left breast lump, testing was unremarkable      Review of Systems   Constitutional:         See HPI         Current Outpatient Medications:     amoxicillin-clavulanate (AUGMENTIN) 875-125 mg per tablet, Via dentist, Disp: , Rfl:     diltiazem (CARDIZEM CD) 240 mg 24 hr capsule, Take 1 capsule (240 mg total) by mouth every morning, Disp: 90 capsule, Rfl: 1    hydroCHLOROthiazide 12.5 mg tablet, Take 1 tablet (12.5 mg total) by mouth every morning, Disp: 90 tablet, Rfl: 1    apixaban (Eliquis) 5 mg, Take 1 tablet (5 mg total) by mouth 2 (two) times a day, Disp: 180 tablet, Rfl: 3    atorvastatin (LIPITOR) 10 mg tablet, Take 1 tablet (10 mg total) by mouth daily, Disp: 90 tablet, Rfl: 3    azelastine (ASTELIN) 0.1 % nasal spray, 1 spray into each nostril if needed Use in each nostril as directed, Disp: , Rfl:     losartan (COZAAR) 100 MG tablet, Take 1 tablet (100 mg total) by mouth every evening, Disp: 90 tablet, Rfl: 3      Objective   /90 (BP Location: Left arm, Patient Position: Sitting, Cuff Size: Large)   Pulse 90   Temp 97.5 °F (36.4 °C) (Temporal)   Resp 16   Ht 6' (1.829 m)   Wt 100 kg (221 lb 3.2 oz)   SpO2 97%   BMI 30.00 kg/m²      Physical Exam  Constitutional:       General: He is not in acute distress.     Appearance: Normal appearance. He is well-developed. He is not ill-appearing.   Eyes:      General: No scleral icterus.  Neck:      Thyroid: No thyromegaly.      Vascular: No carotid bruit.   Cardiovascular:      Rate and Rhythm: Normal rate and regular rhythm.       Heart sounds: Normal heart sounds. No murmur heard.     Comments: No carotid bruit  Pulmonary:      Effort: Pulmonary effort is normal. No respiratory distress.      Breath sounds: Normal breath sounds. No wheezing, rhonchi or rales.   Musculoskeletal:      Right lower leg: No edema.      Left lower leg: No edema.      Comments: Minimally tender lump around left nipple, no swollen glands axillary.  No nipple discharge.   Skin:     Coloration: Skin is not jaundiced.   Neurological:      Mental Status: He is alert. Mental status is at baseline.

## 2024-12-06 NOTE — ASSESSMENT & PLAN NOTE
He saw electrophysiologist over the summer, has had no issues with increased palpitations/fluid retention, is on diltiazem which is increasing to 240 mg daily today, heart rate at home has been running in the 80s to 90 range.  Continue to monitor.    He is on Eliquis without bleeding  Orders:    diltiazem (CARDIZEM CD) 240 mg 24 hr capsule; Take 1 capsule (240 mg total) by mouth every morning

## 2025-01-08 ENCOUNTER — APPOINTMENT (OUTPATIENT)
Dept: LAB | Facility: CLINIC | Age: 65
End: 2025-01-08
Payer: COMMERCIAL

## 2025-01-08 LAB
ANION GAP SERPL CALCULATED.3IONS-SCNC: 11 MMOL/L (ref 4–13)
BACTERIA UR QL AUTO: ABNORMAL /HPF
BILIRUB UR QL STRIP: NEGATIVE
BUN SERPL-MCNC: 21 MG/DL (ref 5–25)
CALCIUM SERPL-MCNC: 8.9 MG/DL (ref 8.4–10.2)
CHLORIDE SERPL-SCNC: 103 MMOL/L (ref 96–108)
CLARITY UR: CLEAR
CO2 SERPL-SCNC: 27 MMOL/L (ref 21–32)
COLOR UR: ABNORMAL
CREAT SERPL-MCNC: 1.08 MG/DL (ref 0.6–1.3)
GFR SERPL CREATININE-BSD FRML MDRD: 71 ML/MIN/1.73SQ M
GLUCOSE P FAST SERPL-MCNC: 95 MG/DL (ref 65–99)
GLUCOSE UR STRIP-MCNC: NEGATIVE MG/DL
HGB UR QL STRIP.AUTO: ABNORMAL
KETONES UR STRIP-MCNC: NEGATIVE MG/DL
LEUKOCYTE ESTERASE UR QL STRIP: NEGATIVE
NITRITE UR QL STRIP: NEGATIVE
NON-SQ EPI CELLS URNS QL MICRO: ABNORMAL /HPF
PH UR STRIP.AUTO: 6 [PH]
POTASSIUM SERPL-SCNC: 4.2 MMOL/L (ref 3.5–5.3)
PROT UR STRIP-MCNC: NEGATIVE MG/DL
RBC #/AREA URNS AUTO: ABNORMAL /HPF
SODIUM SERPL-SCNC: 141 MMOL/L (ref 135–147)
SP GR UR STRIP.AUTO: 1.01 (ref 1–1.03)
UROBILINOGEN UR STRIP-ACNC: <2 MG/DL
WBC #/AREA URNS AUTO: ABNORMAL /HPF

## 2025-01-09 ENCOUNTER — RESULTS FOLLOW-UP (OUTPATIENT)
Dept: FAMILY MEDICINE CLINIC | Facility: CLINIC | Age: 65
End: 2025-01-09

## 2025-02-05 ENCOUNTER — TELEPHONE (OUTPATIENT)
Dept: FAMILY MEDICINE CLINIC | Facility: CLINIC | Age: 65
End: 2025-02-05

## 2025-02-06 ENCOUNTER — OFFICE VISIT (OUTPATIENT)
Dept: FAMILY MEDICINE CLINIC | Facility: CLINIC | Age: 65
End: 2025-02-06
Payer: COMMERCIAL

## 2025-02-06 VITALS
HEART RATE: 83 BPM | WEIGHT: 223 LBS | HEIGHT: 72 IN | DIASTOLIC BLOOD PRESSURE: 90 MMHG | OXYGEN SATURATION: 98 % | BODY MASS INDEX: 30.2 KG/M2 | SYSTOLIC BLOOD PRESSURE: 148 MMHG

## 2025-02-06 DIAGNOSIS — I10 ESSENTIAL HYPERTENSION: Primary | ICD-10-CM

## 2025-02-06 DIAGNOSIS — Z98.890 S/P ABLATION OF ATRIAL FIBRILLATION: ICD-10-CM

## 2025-02-06 DIAGNOSIS — Z79.01 ANTICOAGULATED: ICD-10-CM

## 2025-02-06 DIAGNOSIS — I48.92 ATRIAL FLUTTER, UNSPECIFIED TYPE (HCC): ICD-10-CM

## 2025-02-06 DIAGNOSIS — Z86.79 S/P ABLATION OF ATRIAL FIBRILLATION: ICD-10-CM

## 2025-02-06 DIAGNOSIS — I48.0 PAROXYSMAL ATRIAL FIBRILLATION (HCC): ICD-10-CM

## 2025-02-06 PROCEDURE — 93000 ELECTROCARDIOGRAM COMPLETE: CPT | Performed by: FAMILY MEDICINE

## 2025-02-06 PROCEDURE — 99212 OFFICE O/P EST SF 10 MIN: CPT | Performed by: FAMILY MEDICINE

## 2025-02-06 PROCEDURE — G2211 COMPLEX E/M VISIT ADD ON: HCPCS | Performed by: FAMILY MEDICINE

## 2025-02-06 RX ORDER — DILTIAZEM HYDROCHLORIDE 300 MG/1
300 CAPSULE, COATED, EXTENDED RELEASE ORAL EVERY MORNING
Qty: 90 CAPSULE | Refills: 1 | Status: SHIPPED | OUTPATIENT
Start: 2025-02-06

## 2025-02-06 NOTE — ASSESSMENT & PLAN NOTE
EKG run here today shows     no increased palpitations, sees cardiology yearly, next visit is in July  Orders:    POCT ECG    diltiazem (CARDIZEM CD) 300 mg 24 hr capsule; Take 1 capsule (300 mg total) by mouth every morning

## 2025-02-06 NOTE — ASSESSMENT & PLAN NOTE
Blood pressure here today is 148/90.  Home readings have been running as low as 126/78 around lunchtime but most often is running in the 135-145 range/7885.    See previous visit early December, we had increased diltiazem to 240 mg at that time, I would like him to increase diltiazem further to 300 mg daily, he will continue on losartan 100 mg daily, hydrochlorothiazide 12.5 mg every morning.    Continue to monitor blood pressure readings at different times while relaxed at home, let us know how those are running in 3 to 4 weeks.    He will continue with his routine walking/exercise, continues to walk 2 to 3 miles routinely.  Has been drinking 8 to 12 ounces of coffee daily, try to slowly decrease, 6 ounce maximum.    He will keep his appointment in June, sees cardiology again in July    Redo testing in January showed negative urinalysis, creatinine stable at 1.08 with potassium 4.2, glucose 95.  Orders:    POCT ECG    diltiazem (CARDIZEM CD) 300 mg 24 hr capsule; Take 1 capsule (300 mg total) by mouth every morning

## 2025-02-06 NOTE — ASSESSMENT & PLAN NOTE
Orders:    diltiazem (CARDIZEM CD) 300 mg 24 hr capsule; Take 1 capsule (300 mg total) by mouth every morning

## 2025-02-06 NOTE — PROGRESS NOTES
Name: Eric Ron      : 1960      MRN: [id number]  Encounter Provider: Yonatan Holcomb DO  Encounter Date: 2025   Encounter department: [facility]  :  Assessment & Plan  Essential hypertension  Blood pressure here today is 148/90.  Home readings have been running as low as 126/78 around lunchtime but most often is running in the 135-145 range/7885.    See previous visit early December, we had increased diltiazem to 240 mg at that time, I would like him to increase diltiazem further to 300 mg daily, he will continue on losartan 100 mg daily, hydrochlorothiazide 12.5 mg every morning.    Continue to monitor blood pressure readings at different times while relaxed at home, let us know how those are running in 3 to 4 weeks.    He will continue with his routine walking/exercise, continues to walk 2 to 3 miles routinely.  Has been drinking 8 to 12 ounces of coffee daily, try to slowly decrease, 6 ounce maximum.    He will keep his appointment in , sees cardiology again in July    Redo testing in January showed negative urinalysis, creatinine stable at 1.08 with potassium 4.2, glucose 95.  Orders:    POCT ECG    diltiazem (CARDIZEM CD) 300 mg 24 hr capsule; Take 1 capsule (300 mg total) by mouth every morning    Paroxysmal atrial fibrillation and flutter (HCC)  EKG run here today shows     no increased palpitations, sees cardiology yearly, next visit is in July  Orders:    POCT ECG    diltiazem (CARDIZEM CD) 300 mg 24 hr capsule; Take 1 capsule (300 mg total) by mouth every morning    S/P ablation of atrial fibrillation  No increased palpitations, sees cardiology yearly, next visit is in July  Orders:    POCT ECG    Anticoagulated with Eliquis  Has had no issues with anticoagulation       Atrial flutter, unspecified type (HCC)    Orders:    diltiazem (CARDIZEM CD) 300 mg 24 hr capsule; Take 1 capsule (300 mg total) by mouth every morning           History of Present Illness    He is in to recheck his blood pressure, see previous visits/messages.  Systolic readings still run slightly high at times.  Remains active, no chest pain, no shortness of breath    Hypertension  This is a recurrent problem. The current episode started 1 to 4 weeks ago. The problem has been waxing and waning since onset. The problem is controlled. Associated symptoms include sweats. Pertinent negatives include no anxiety, blurred vision, chest pain, headaches, malaise/fatigue, neck pain, orthopnea, palpitations, peripheral edema, PND or shortness of breath. There are no associated agents to hypertension. Risk factors for coronary artery disease include family history. There are no compliance problems.      Review of Systems   Constitutional:  Negative for chills, fever, malaise/fatigue and unexpected weight change.        Feels warm at night at times   Eyes:  Negative for blurred vision.   Respiratory:  Positive for cough (mild due to URI). Negative for chest tightness, shortness of breath and wheezing.    Cardiovascular:  Negative for chest pain, palpitations, orthopnea, leg swelling and PND.   Gastrointestinal:  Negative for abdominal pain.   Genitourinary:  Negative for dysuria and hematuria.   Musculoskeletal:  Negative for neck pain.   Neurological:  Negative for headaches.   Hematological:  Does not bruise/bleed easily.       Current Outpatient Medications:     apixaban (Eliquis) 5 mg, Take 1 tablet (5 mg total) by mouth 2 (two) times a day, Disp: 180 tablet, Rfl: 3    atorvastatin (LIPITOR) 10 mg tablet, Take 1 tablet (10 mg total) by mouth daily, Disp: 90 tablet, Rfl: 3    azelastine (ASTELIN) 0.1 % nasal spray, 1 spray into each nostril if needed Use in each nostril as directed, Disp: , Rfl:     diltiazem (CARDIZEM CD) 300 mg 24 hr capsule, Take 1 capsule (300 mg total) by mouth every morning, Disp: 90 capsule, Rfl: 1    hydroCHLOROthiazide 12.5 mg tablet, Take 1 tablet (12.5 mg total) by mouth every  morning, Disp: 90 tablet, Rfl: 1    losartan (COZAAR) 100 MG tablet, Take 1 tablet (100 mg total) by mouth every evening, Disp: 90 tablet, Rfl: 3   Objective   /90   Pulse 83   Ht 6' (1.829 m)   Wt 101 kg (223 lb)   SpO2 98%   BMI 30.24 kg/m²      Physical Exam  Constitutional:       General: He is not in acute distress.     Appearance: Normal appearance. He is not ill-appearing.   Cardiovascular:      Rate and Rhythm: Normal rate and regular rhythm.      Heart sounds: Normal heart sounds. No murmur heard.  Pulmonary:      Effort: No respiratory distress.      Breath sounds: Normal breath sounds.   Musculoskeletal:      Right lower leg: No edema.      Left lower leg: No edema.   Neurological:      Mental Status: He is alert.

## 2025-05-07 ENCOUNTER — OFFICE VISIT (OUTPATIENT)
Age: 65
End: 2025-05-07

## 2025-05-07 VITALS — TEMPERATURE: 98 F

## 2025-05-07 DIAGNOSIS — L72.0 EPIDERMAL INCLUSION CYST: ICD-10-CM

## 2025-05-07 DIAGNOSIS — D18.01 CHERRY ANGIOMA: Primary | ICD-10-CM

## 2025-05-07 DIAGNOSIS — D22.60 MULTIPLE BENIGN MELANOCYTIC NEVI OF UPPER EXTREMITY, LOWER EXTREMITY, AND TRUNK: ICD-10-CM

## 2025-05-07 DIAGNOSIS — D22.70 MULTIPLE BENIGN MELANOCYTIC NEVI OF UPPER EXTREMITY, LOWER EXTREMITY, AND TRUNK: ICD-10-CM

## 2025-05-07 DIAGNOSIS — D23.9 DERMATOFIBROMA: ICD-10-CM

## 2025-05-07 DIAGNOSIS — L82.1 SEBORRHEIC KERATOSES: ICD-10-CM

## 2025-05-07 DIAGNOSIS — L81.4 LENTIGINES: ICD-10-CM

## 2025-05-07 DIAGNOSIS — D48.5 NEOPLASM OF UNCERTAIN BEHAVIOR OF SKIN: ICD-10-CM

## 2025-05-07 DIAGNOSIS — D22.5 MULTIPLE BENIGN MELANOCYTIC NEVI OF UPPER EXTREMITY, LOWER EXTREMITY, AND TRUNK: ICD-10-CM

## 2025-05-07 PROCEDURE — 88305 TISSUE EXAM BY PATHOLOGIST: CPT | Performed by: STUDENT IN AN ORGANIZED HEALTH CARE EDUCATION/TRAINING PROGRAM

## 2025-05-07 PROCEDURE — 88342 IMHCHEM/IMCYTCHM 1ST ANTB: CPT | Performed by: STUDENT IN AN ORGANIZED HEALTH CARE EDUCATION/TRAINING PROGRAM

## 2025-05-07 PROCEDURE — 88341 IMHCHEM/IMCYTCHM EA ADD ANTB: CPT | Performed by: STUDENT IN AN ORGANIZED HEALTH CARE EDUCATION/TRAINING PROGRAM

## 2025-05-07 NOTE — PROGRESS NOTES
"[facility]atology [facility] Note     Patient Name: Eric Ron  Encounter Date: 5/7/25       Have you been cared for by a [facility] Dermatologist in the last 3 years and, if so, which description applies to you? Yes. I have been here within the last 3 years, and my medical history has NOT changed since that time. I am not of child-bearing potential.     REVIEW OF SYSTEMS:  Have you recently had or currently have any of the following? No changes in my recent health.   PAST MEDICAL HISTORY:  Have you personally ever had or currently have any of the following?  If \"YES,\" then please provide more detail. No changes in my medical history.   HISTORY OF IMMUNOSUPPRESSION: Do you have a history of any of the following:  Systemic Immunosuppression such as Diabetes, Biologic or Immunotherapy, Chemotherapy, Organ Transplantation, Bone Marrow Transplantation or Prednisone?  No     Answering \"YES\" requires the addition of the dotphrase \"IMMUNOSUPPRESSED\" as the first diagnosis of the patient's visit.   FAMILY HISTORY:  Any \"first degree relatives\" (parent, brother, sister, or child) with the following?    No changes in my family's known health.   PATIENT EXPERIENCE:    Do you want the Dermatologist to perform a COMPLETE skin exam today including a clinical examination under the \"bra and underwear\" areas?  Yes  If necessary, do we have your permission to call and leave a detailed message on your Preferred Phone number that includes your specific medical information?  Yes      Allergies   Allergen Reactions    Lisinopril Edema     Annotation - [id number]: lip swelling x 2 2015    Dust Mite Extract     Short Ragweed Pollen Ext       Current Outpatient Medications:     apixaban (Eliquis) 5 mg, Take 1 tablet (5 mg total) by mouth 2 (two) times a day, Disp: 180 tablet, Rfl: 3    atorvastatin (LIPITOR) 10 mg tablet, Take 1 tablet (10 mg total) by mouth daily, Disp: 90 tablet, Rfl: 3    azelastine (ASTELIN) 0.1 % nasal spray, 1 " "spray into each nostril if needed Use in each nostril as directed, Disp: , Rfl:     diltiazem (CARDIZEM CD) 300 mg 24 hr capsule, Take 1 capsule (300 mg total) by mouth every morning, Disp: 90 capsule, Rfl: 1    hydroCHLOROthiazide 12.5 mg tablet, Take 1 tablet (12.5 mg total) by mouth every morning, Disp: 90 tablet, Rfl: 1    losartan (COZAAR) 100 MG tablet, Take 1 tablet (100 mg total) by mouth every evening, Disp: 90 tablet, Rfl: 3        Whom besides the patient is providing clinical information about today's encounter?   NO ADDITIONAL HISTORIAN (patient alone provided history)    Physical Exam and Assessment/Plan by Diagnosis:    CHERRY ANGIOMAS  Physical Exam:  Anatomic Location Affected:  Trunk and extremities  Morphological Description:  Scattered cherry red papules  Denies pain, itch, bleeding. No treatments tried. Present for years. Present constantly; no modifying factors which make it worse or better.     Assessment and Plan:  Based on a thorough discussion of this condition and the management approach to it (including a comprehensive discussion of the known risks, side effects and potential benefits of treatment), the patient (family) agrees to implement the following specific plan:  Reassure benign        SEBORRHEIC KERATOSIS; NON-INFLAMED  Physical Exam:  Anatomic Location Affected:  Trunk and extremities  Morphological Description:  Waxy, smooth to warty textured, yellow to brownish-grey to dark brown to blackish, discrete, \"stuck-on\" appearing papules.  Present for years. Denies pain, itch, bleeding.      Additional History of Present Condition:  Present constantly; no modifying factors which make it worse or better. No prior treatment.       Assessment and Plan:  Based on a thorough discussion of this condition and the management approach to it (including a comprehensive discussion of the known risks, side effects and potential benefits of treatment), the patient (family) agrees to implement the " "following specific plan:  Reassure benign  Use sun protection.  Apply SPF 30 or higher at least three times a day.  Wear sun protecting clothing and hats.        SOLAR LENTIGINES   OTHER SKIN CHANGES DUE TO CHRONIC EXPOSURE TO NONIONIZING RADIATION  Physical Exam:  Anatomic Location Affected:  Sun exposed areas of back, chest, arms, legs  Morphological Description:  Multiple scattered brown to tan evenly pigmented macules   Denies pain, itch, bleeding. No treatments tried. Present for months - years. Reports getting newer lesions with sun exposure.      Assessment and Plan:  Based on a thorough discussion of this condition and the management approach to it (including a comprehensive discussion of the known risks, side effects and potential benefits of treatment), the patient (family) agrees to implement the following specific plan:  Reassure benign  Use sun protection.  Apply SPF 30 or higher at least three times a day.  Wear sun protecting clothing and hats.         MULTIPLE MELANOCYTIC NEVI (\"Moles\")  Physical Exam:  Anatomic Location Affected: Trunk and extremities  Morphological Description:  Scattered, round to ovoid, symmetrical-appearing, even bordered, skin colored to dark brown macules/papules  Denies pain, itch, bleeding. No treatments tried. Present for years. Present constantly; no modifying factors which make it worse or better. Denies actively changing or growing moles.      Assessment and Plan:  Based on a thorough discussion of this condition and the management approach to it (including a comprehensive discussion of the known risks, side effects and potential benefits of treatment), the patient (family) agrees to implement the following specific plan:  Reassure benign  Monitor for changes  Use sun protection.  Apply SPF 30 or higher at least three times a day.  Wear sun protecting clothing and hats.     Worrisome signs of skin malignancy discussed, questions answered. Regular self-skin check " "discussed. Advised to call or return to office if patient notices any spots of concern, rapidly growing/changing lesions, bleeding lesions, non-healing lesions. Advised regular SPF use.        EPIDERMAL INCLUSION CYSTS  Physical Exam:  Anatomic Location Affected:  mid upper back  Morphological Description:  subcutaneous nodules (2)  Pertinent Positives:  Pertinent Negatives:    Additional History of Present Condition:  patient reports that thee have been present for years. He denies any pain or other symptoms.    Assessment and Plan:  Based on a thorough discussion of this condition and the management approach to it (including a comprehensive discussion of the known risks, side effects and potential benefits of treatment), the patient (family) agrees to implement the following specific plan:  Reassured benign.  If they were to become bothersome in the future, we can schedule an excision.      DERMATOFIBROMA  Physical Exam:  Anatomic Location Affected:  left leg  Morphological Description:  fibrous nodule  Pertinent Positives:  Pertinent Negatives:    Additional History of Present Condition:  noted on exam.    Assessment and Plan:  Based on a thorough discussion of this condition and the management approach to it (including a comprehensive discussion of the known risks, side effects and potential benefits of treatment), the patient (family) agrees to implement the following specific plan:  Reassured benign.      NEOPLASM OF UNCERTAIN BEHAVIOR OF SKIN  Physical Exam:  (Anatomic Location); (Size and Morphological Description); (Differential Diagnosis):  A: upper mid back; 0.4 cm dark brown macule; nevi r/o atypia  Pertinent Positives:  Pertinent Negatives:      Additional History of Present Condition:  noted on exam.    Assessment and Plan:  I have discussed with the patient that a sample of skin via a \"skin biopsy” would be potentially helpful to further make a specific diagnosis under the microscope.  Based on a " thorough discussion of this condition and the management approach to it (including a comprehensive discussion of the known risks, side effects and potential benefits of treatment), the patient (family) agrees to implement the following specific plan:    Procedure:  Skin Biopsy.  After a thorough discussion of treatment options and risk/benefits/alternatives (including but not limited to local pain, scarring, dyspigmentation, blistering, possible superinfection, and inability to confirm a diagnosis via histopathology), verbal and written consent were obtained and portion of the rash was biopsied for tissue sample.  See below for consent that was obtained from patient and subsequent Procedure Note.    PROCEDURE TANGENTIAL (SHAVE) BIOPSY NOTE:    Performing Physician:  Dr. Lazo  Anatomic Location; Clinical Description with size (cm); Pre-Op Diagnosis:   A: upper mid back; 0.4 cm dark brown macule; nevi r/o atypia  Post-op diagnosis: Same     Local anesthesia: 1% Lidocaine HCL     Topical anesthesia: None    Hemostasis: Aluminum chloride     After obtaining informed consent  at which time there was a discussion about the purpose of biopsy  and low risks of infection and bleeding.  The area was prepped and draped in the usual fashion. Anesthesia was obtained with 1% lidocaine with epinephrine. A shave biopsy to an appropriate sampling depth was obtained by Shave (Dermablade or 15 blade) The resulting wound was covered with surgical ointment and bandaged appropriately.     The patient tolerated the procedure well without complications and was without signs of functional compromise.      Specimen has been sent for review by Dermatopathology.    Standard post-procedure care has been explained and has been included in written form within the patient's copy of Informed Consent.    INFORMED CONSENT DISCUSSION AND POST-OPERATIVE INSTRUCTIONS FOR PATIENT    I.  RATIONALE FOR PROCEDURE  I understand that a skin biopsy  "allows the Dermatologist to test a lesion or rash under the microscope to obtain a diagnosis.  It usually involves numbing the area with numbing medication and removing a small piece of skin; sometimes the area will be closed with sutures. In this specific procedure, sutures are not usually needed.  If any sutures are placed, then they are usually need to be removed in 2 weeks or less.    I understand that my Dermatologist recommends that a skin \"shave\" biopsy be performed today.  A local anesthetic, similar to the kind that a dentist uses when filling a cavity, will be injected with a very small needle into the skin area to be sampled.  The injected skin and tissue underneath \"will go to sleep” and become numb so no pain should be felt afterwards.  An instrument shaped like a tiny \"razor blade\" (shave biopsy instrument) will be used to cut a small piece of tissue and skin from the area so that a sample of tissue can be taken and examined more closely under the microscope.  A slight amount of bleeding will occur, but it will be stopped with direct pressure and a pressure bandage and any other appropriate methods.  I understands that a scar will form where the wound was created.  Surgical ointment will be applied to help protect the wound.  Sutures are not usually needed.    II.  RISKS AND POTENTIAL COMPLICATIONS   I understand the risks and potential complications of a skin biopsy include but are not limited to the following:  Bleeding  Infection  Pain  Scar/keloid  Skin discoloration  Incomplete Removal  Recurrence  Nerve Damage/Numbness/Loss of Function  Allergic Reaction to Anesthesia  Biopsies are diagnostic procedures and based on findings additional treatment or evaluation may be required  Loss or destruction of specimen resulting in no additional findings    My Dermatologist has explained to me the nature of the condition, the nature of the procedure, and the benefits to be reasonably expected compared with " "alternative approaches.  My Dermatologist has discussed the likelihood of major risks or complications of this procedure including the specific risks listed above, such as bleeding, infection, and scarring/keloid.  I understand that a scar is expected after this procedure.  I understand that my physician cannot predict if the scar will form a \"keloid,\" which extends beyond the borders of the wound that is created.  A keloid is a thick, painful, and bumpy scar.  A keloid can be difficult to treat, as it does not always respond well to therapy, which includes injecting cortisone directly into the keloid every few weeks.  While this usually reduces the pain and size of the scar, it does not eliminate it.      I understand that photographs may be taken before and after the procedure.  These will be maintained as part of the medical providers confidential records and may not be made available to me.  I further authorize the medical provider to use the photographs for teaching purposes or to illustrate scientific papers, books, or lectures if in his/her judgment, medical research, education, or science may benefit from its use.    I have had an opportunity to fully inquire about the risks and benefits of this procedure and its alternatives.   I have been given ample time and opportunity to ask questions and to seek a second opinion if I wished to do so.  I acknowledge that there have specifically been no guarantees as to the cosmetic results from the procedure.  I am aware that with any procedure there is always the possibility of an unexpected complication.    III. POST-PROCEDURAL CARE (WHAT YOU WILL NEED TO DO \"AFTER THE BIOPSY\" TO OPTIMIZE HEALING)    Keep the area clean and dry.  Try NOT to remove the bandage or get it wet for the first 24 hours.    Gently clean the area and apply surgical ointment (such as Vaseline petrolatum ointment, which is available \"over the counter\" and not a prescription) to the biopsy site " for up to 2 weeks straight.  This acts to protect the wound from the outside world.      Sutures are not usually placed in this procedure.  If any sutures were placed, return for suture removal as instructed (generally 1 week for the face, 2 weeks for the body).      Take Acetaminophen (Tylenol) for discomfort, if no contraindications.  Ibuprofen or aspirin could make bleeding worse.    Call our office immediately for signs of infection: fever, chills, increased redness, warmth, tenderness, discomfort/pain, or pus or foul smell coming from the wound.    WHAT TO DO IF THERE IS ANY BLEEDING?  If a small amount of bleeding is noticed, place a clean cloth over the area and apply firm pressure for ten minutes.  Check the wound after 10 minutes of direct pressure.  If bleeding persists, try one more time for an additional 10 minutes of direct pressure on the area.  If the bleeding becomes heavier or does not stop after the second attempt, or if you have any other questions about this procedure, then please call your [facility] Dermatologist by calling [phone] (SKIN).     I hereby acknowledge that I have reviewed and verified the site with my Dermatologist and have requested and authorized my Dermatologist to proceed with the procedure.      Scribe Attestation      I,:  Elisa Hernandez MA am acting as a scribe while in the presence of the attending physician.:       I,:  Bert Lazo MD personally performed the services described in this documentation    as scribed in my presence.:

## 2025-05-13 ENCOUNTER — RESULTS FOLLOW-UP (OUTPATIENT)
Dept: DERMATOLOGY | Facility: CLINIC | Age: 65
End: 2025-05-13

## 2025-05-13 PROCEDURE — 88341 IMHCHEM/IMCYTCHM EA ADD ANTB: CPT | Performed by: STUDENT IN AN ORGANIZED HEALTH CARE EDUCATION/TRAINING PROGRAM

## 2025-05-13 PROCEDURE — 88342 IMHCHEM/IMCYTCHM 1ST ANTB: CPT | Performed by: STUDENT IN AN ORGANIZED HEALTH CARE EDUCATION/TRAINING PROGRAM

## 2025-05-13 PROCEDURE — 88305 TISSUE EXAM BY PATHOLOGIST: CPT | Performed by: STUDENT IN AN ORGANIZED HEALTH CARE EDUCATION/TRAINING PROGRAM

## 2025-05-13 NOTE — RESULT ENCOUNTER NOTE
DERMATOPATHOLOGY RESULT NOTE    Results reviewed by ordering physician.  Called patient to personally discuss results. No answer, left voicemail with result.      Instructions for Clinical Derm Team:   (remember to route Result Note to appropriate staff):    None    Result & Plan by Specimen:    Specimen A: benign  Plan: reassured, benign. Advised to reach out to us for further evaluation should it recur        A. Skin, upper mid back, shave biopsy:    Compound melanocytic nevus; extends to the tissue edge (see note).    Note: SOX10, MART-1, and PRAME immunostains were reviewed; significant melanocytic architectural disorder is not seen, and the lesional cells are negative for PRAME.      Electronically signed by Burton Ramos MD on 5/13/2025 at 1248 EDT

## 2025-06-16 DIAGNOSIS — I10 ESSENTIAL HYPERTENSION: ICD-10-CM

## 2025-06-16 RX ORDER — HYDROCHLOROTHIAZIDE 12.5 MG/1
12.5 TABLET ORAL EVERY MORNING
Qty: 30 TABLET | Refills: 0 | Status: SHIPPED | OUTPATIENT
Start: 2025-06-16

## 2025-06-16 NOTE — TELEPHONE ENCOUNTER
Reason for call:   [x] Refill   [] Prior Auth  [] Other:     Office:   [x] PCP/Provider -   [] Specialty/Provider -     Medication: hydroCHLOROthiazide 12.5 mg tablet - Take 1 tablet (12.5 mg total) by mouth every morning      Pharmacy: [facility]- [place], [place]     Local Pharmacy   Does the patient have enough for 3 days?   [] Yes   [x] No - Send as HP to POD    Mail Away Pharmacy   Does the patient have enough for 10 days?   [] Yes   [] No - Send as HP to POD

## 2025-06-20 ENCOUNTER — OFFICE VISIT (OUTPATIENT)
Dept: FAMILY MEDICINE CLINIC | Facility: CLINIC | Age: 65
End: 2025-06-20
Payer: COMMERCIAL

## 2025-06-20 VITALS
DIASTOLIC BLOOD PRESSURE: 80 MMHG | OXYGEN SATURATION: 94 % | BODY MASS INDEX: 29.37 KG/M2 | HEART RATE: 88 BPM | HEIGHT: 73 IN | SYSTOLIC BLOOD PRESSURE: 135 MMHG | WEIGHT: 221.6 LBS

## 2025-06-20 DIAGNOSIS — E55.9 VITAMIN D DEFICIENCY: ICD-10-CM

## 2025-06-20 DIAGNOSIS — I10 ESSENTIAL HYPERTENSION: ICD-10-CM

## 2025-06-20 DIAGNOSIS — Z23 ENCOUNTER FOR IMMUNIZATION: ICD-10-CM

## 2025-06-20 DIAGNOSIS — Z13.29 SCREENING FOR HYPOTHYROIDISM: ICD-10-CM

## 2025-06-20 DIAGNOSIS — Z13.1 SCREENING FOR DIABETES MELLITUS: ICD-10-CM

## 2025-06-20 DIAGNOSIS — I48.0 PAROXYSMAL ATRIAL FIBRILLATION (HCC): ICD-10-CM

## 2025-06-20 DIAGNOSIS — Z13.0 SCREENING FOR DEFICIENCY ANEMIA: ICD-10-CM

## 2025-06-20 DIAGNOSIS — E78.2 MIXED HYPERLIPIDEMIA: ICD-10-CM

## 2025-06-20 DIAGNOSIS — Z12.11 COLON CANCER SCREENING: ICD-10-CM

## 2025-06-20 DIAGNOSIS — Z13.89 SCREENING FOR BLOOD OR PROTEIN IN URINE: ICD-10-CM

## 2025-06-20 DIAGNOSIS — Z12.2 SCREENING FOR LUNG CANCER: ICD-10-CM

## 2025-06-20 DIAGNOSIS — Z00.00 MEDICARE ANNUAL WELLNESS VISIT, SUBSEQUENT: Primary | ICD-10-CM

## 2025-06-20 DIAGNOSIS — Z12.5 SCREENING FOR PROSTATE CANCER: ICD-10-CM

## 2025-06-20 PROCEDURE — 99214 OFFICE O/P EST MOD 30 MIN: CPT | Performed by: INTERNAL MEDICINE

## 2025-06-20 PROCEDURE — G2211 COMPLEX E/M VISIT ADD ON: HCPCS | Performed by: INTERNAL MEDICINE

## 2025-06-20 PROCEDURE — G0439 PPPS, SUBSEQ VISIT: HCPCS | Performed by: INTERNAL MEDICINE

## 2025-06-20 PROCEDURE — 90677 PCV20 VACCINE IM: CPT

## 2025-06-20 PROCEDURE — G0009 ADMIN PNEUMOCOCCAL VACCINE: HCPCS

## 2025-06-20 NOTE — ASSESSMENT & PLAN NOTE
Continue follow-up with cardiology, he seems to be in sinus rhythm right now, continue current dose of Eliquis and Cardizem.

## 2025-06-20 NOTE — ASSESSMENT & PLAN NOTE
Blood pressure is acceptable, recheck kidney function and electrolytes in January 26.  Orders:    Comprehensive metabolic panel; Future

## 2025-06-20 NOTE — PROGRESS NOTES
Name: Eric Ron      : 1960      MRN: [id number]  Encounter Provider: Pavan Blankenship MD  Encounter Date: 2025   Encounter department: [facility]  :  Assessment & Plan       Preventive health issues were discussed with patient, and age appropriate screening tests were ordered as noted in patient's After Visit Summary. Personalized health advice and appropriate referrals for health education or preventive services given if needed, as noted in patient's After Visit Summary.    History of Present Illness     HPI   Patient Care Team:  Pavan Blankenship MD as PCP - General (Internal Medicine)    Review of Systems  Medical History Reviewed by provider this encounter:       Annual Wellness Visit Questionnaire   Eric is here for his Subsequent Wellness visit.     Health Risk Assessment:   Patient rates overall health as very good. Patient feels that their physical health rating is slightly better. Patient is very satisfied with their life. Eyesight was rated as same. Hearing was rated as same. Patient feels that their emotional and mental health rating is same. Patients states they are sometimes angry. Patient states they are never, rarely unusually tired/fatigued. Pain experienced in the last 7 days has been some. Patient's pain rating has been 4/10. Patient states that he has experienced no weight loss or gain in last 6 months.     Depression Screening:   PHQ-2 Score: 0  PHQ-9 Score: 0      Fall Risk Screening:   In the past year, patient has experienced: no history of falling in past year      Home Safety:  Patient does not have trouble with stairs inside or outside of their home. Patient has working smoke alarms and has working carbon monoxide detector. Home safety hazards include: none.     Nutrition:   Current diet is Limited junk food.     Medications:   Patient is not currently taking any over-the-counter supplements. Patient is able to manage  medications.     Activities of Daily Living (ADLs)/Instrumental Activities of Daily Living (IADLs):   Walk and transfer into and out of bed and chair?: Yes  Dress and groom yourself?: Yes    Bathe or shower yourself?: Yes    Feed yourself? Yes  Do your laundry/housekeeping?: Yes  Manage your money, pay your bills and track your expenses?: Yes  Make your own meals?: Yes    Do your own shopping?: Yes    Previous Hospitalizations:   Any hospitalizations or ED visits within the last 12 months?: No      Advance Care Planning:   Living will: Yes    Durable POA for healthcare: Yes    Advanced directive: Yes      Preventive Screenings      Cardiovascular Screening:    General: Screening Not Indicated and History Lipid Disorder      Diabetes Screening:     General: Screening Current      Colorectal Cancer Screening:     General: Screening Current      Abdominal Aortic Aneurysm (AAA) Screening:    Risk factors include: age between 65-74 yo and tobacco use        Lung Cancer Screening:     General: Screening Current      Hepatitis C Screening:    General: Screening Current    Immunizations:  - Immunizations due: Hepatitis A and Hepatitis B    Screening, Brief Intervention, and Referral to Treatment (SBIRT)     Screening  Typical number of drinks in a day: 2  Typical number of drinks in a week: 10  Interpretation: Low risk drinking behavior.    Single Item Drug Screening:  How often have you used an illegal drug (including marijuana) or a prescription medication for non-medical reasons in the past year? never    Single Item Drug Screen Score: 0  Interpretation: Negative screen for possible drug use disorder    Social Drivers of Health     Food Insecurity: No Food Insecurity (6/15/2025)    Nursing - Inadequate Food Risk Classification     Worried About Running Out of Food in the Last Year: Never true     Ran Out of Food in the Last Year: Never true   Transportation Needs: No Transportation Needs (6/15/2025)    PRAPARE -  "Transportation     Lack of Transportation (Medical): No     Lack of Transportation (Non-Medical): No   Housing Stability: Low Risk  (6/15/2025)    Housing Stability Vital Sign     Unable to Pay for Housing in the Last Year: No     Number of Times Moved in the Last Year: 0     Homeless in the Last Year: No   Utilities: Not At Risk (6/15/2025)    [facility] Utilities     Threatened with loss of utilities: No     No results found.    Objective   /80   Pulse 88   Ht 6' 0.84\" (1.85 m)   Wt 101 kg (221 lb 9.6 oz)   SpO2 94%   BMI 29.37 kg/m²     Physical Exam    Answers submitted by the patient for this visit:  Annual Physical (Submitted on 6/20/2025)  Diet/Nutrition choices: well balanced diet, consuming 3-5 servings of fruits/vegetables daily, adequate fiber intake, adequate whole grain intake  Exercise choices: walking, moderate cardiovascular exercise, 5-7 times a week on average, 30-60 minutes on average  Sleep choices: sleeps poorly, 7-8 hours of sleep on average  Hearing choices: normal hearing right ear, normal hearing left ear  Vision choices: no vision problems, most recent eye exam < 1 year ago, wears glasses and contacts  Dental choices: regular dental visits, brushes teeth twice daily, floss regularly  Do you currently have an OB/GYN provider that you routinely follow with?: No  Any history of sexual transmitted disease/infection?: No  Urinary symptoms: urinary frequency, weak urinary stream  Do you have an advance directive/living will?: Yes  Do you have a durable power of  (POA)?: Yes    "

## 2025-06-20 NOTE — PROGRESS NOTES
Name: Eric Ron      : 1960      MRN: [id number]  Encounter Provider: Pavan Blankenship MD  Encounter Date: 2025   [facility]    Assessment & Plan  Essential hypertension  Blood pressure is acceptable, recheck kidney function and electrolytes in .  Orders:    Comprehensive metabolic panel; Future    Paroxysmal atrial fibrillation and flutter (HCC)  Continue follow-up with cardiology, he seems to be in sinus rhythm right now, continue current dose of Eliquis and Cardizem.       Encounter for immunization    Orders:    Pneumococcal Conjugate Vaccine 20-valent (Pcv20)    Colon cancer screening    Orders:    Ambulatory Referral to Gastroenterology; Future    Screening for lung cancer  I discussed with him that he is a candidate for lung cancer CT screening.     The following Shared Decision-Making points were covered:  Benefits of screening were discussed, including the rates of reduction in death from lung cancer and other causes.  Harms of screening were reviewed, including false positive tests, radiation exposure levels, risks of invasive procedures, risks of complications of screening, and risk of overdiagnosis.  I counseled on the importance of adherence to annual lung cancer LDCT screening, impact of co-morbidities, and ability or willingness to undergo diagnosis and treatment.  I counseled on the importance of maintaining abstinence as a former smoker or was counseled on the importance of smoking cessation if a current smoker    Review of Eligibility Criteria: He meets all of the criteria for Lung Cancer Screening.   He is 64 y.o.   He has 20 pack year tobacco history and is a current smoker or has quit within the past 15 years  He presents no signs or symptoms of lung cancer    After discussion, the patient decided to elect lung cancer screening.    Orders:    CT lung nodule follow-up; Future    Screening for diabetes  mellitus    Orders:    Hemoglobin A1C; Future    Screening for hypothyroidism    Orders:    TSH, 3rd generation with Free T4 reflex; Future    Screening for deficiency anemia    Orders:    CBC and differential; Future    Mixed hyperlipidemia  Continue current dose of Lipitor, recheck lipid panel with the next blood work.  Orders:    Lipid panel; Future    Vitamin D deficiency    Orders:    Vitamin D 25 hydroxy; Future    Screening for blood or protein in urine    Orders:    UA (URINE) with reflex to Scope; Future    Screening for prostate cancer    Orders:    PSA, Total Screen; Future    Medicare annual wellness visit, subsequent              History of Present Illness     Patient came today for Medicare wellness visit and to follow-up on his chronic medical problems.  Vital signs are acceptable except of elevated BMI.  He does not smoke he quit 12 years ago.  He is physically active.  Agreed for Prevnar.  Follows up with cardiology.        Review of Systems   Constitutional:  Negative for chills and fever.   Respiratory:  Negative for cough, shortness of breath and wheezing.    Cardiovascular:  Negative for chest pain, palpitations and leg swelling.   Gastrointestinal:  Negative for abdominal distention and abdominal pain.   Psychiatric/Behavioral:  Negative for confusion.      Past Medical History[1]  Past Surgical History[2]  Family History[3]  Social History[4]  Medications[5]  Allergies   Allergen Reactions    Lisinopril Edema     Annotation - [id number]: lip swelling x 2 2015    Dust Mite Extract     Short Ragweed Pollen Ext      Immunization History   Administered Date(s) Administered    COVID-19 MODERNA VACC 0.5 ML IM 12/28/2020, 01/24/2021, 11/01/2021, 05/03/2022    COVID-19 Moderna mRNA Vaccine 12 Yr+ 50 mcg/0.5 mL (Spikevax) 12/11/2024    COVID-19 Pfizer Vac BIVALENT Micah-sucrose 12 Yr+ IM 10/19/2022    INFLUENZA 10/05/2015, 10/01/2018, 10/19/2020, 10/01/2021, 10/03/2022, 10/01/2024    Influenza  "Quadrivalent Preservative Free 3 years and older IM 10/30/2014, 10/01/2016, 10/01/2018    Influenza Quadrivalent, 6-35 Months IM 10/20/2015    Influenza, injectable, quadrivalent, preservative free 0.5 mL 10/04/2023    Pneumococcal Conjugate Vaccine 20-valent (Pcv20), Polysace 06/20/2025    Tdap 02/01/2015    Zoster Vaccine Recombinant 12/24/2019, 05/08/2020     Objective   /80   Pulse 88   Ht 6' 0.84\" (1.85 m)   Wt 101 kg (221 lb 9.6 oz)   SpO2 94%   BMI 29.37 kg/m²     Physical Exam  Constitutional:       General: He is not in acute distress.     Appearance: He is not ill-appearing or toxic-appearing.   HENT:      Right Ear: Tympanic membrane normal. There is no impacted cerumen.      Left Ear: Tympanic membrane normal. There is no impacted cerumen.     Cardiovascular:      Heart sounds: No murmur heard.     No gallop.   Pulmonary:      Effort: No respiratory distress.      Breath sounds: No wheezing or rales.   Abdominal:      General: There is no distension.      Tenderness: There is no abdominal tenderness. There is no guarding.         Answers submitted by the patient for this visit:  Annual Physical (Submitted on 6/20/2025)  Diet/Nutrition choices: well balanced diet, consuming 3-5 servings of fruits/vegetables daily, adequate fiber intake, adequate whole grain intake  Exercise choices: walking, moderate cardiovascular exercise, 5-7 times a week on average, 30-60 minutes on average  Sleep choices: sleeps poorly, 7-8 hours of sleep on average  Hearing choices: normal hearing right ear, normal hearing left ear  Vision choices: no vision problems, most recent eye exam < 1 year ago, wears glasses and contacts  Dental choices: regular dental visits, brushes teeth twice daily, floss regularly  Do you currently have an OB/GYN provider that you routinely follow with?: No  Any history of sexual transmitted disease/infection?: No  Urinary symptoms: urinary frequency, weak urinary stream  Do you have an " advance directive/living will?: Yes  Do you have a durable power of  (POA)?: Yes         [1]   Past Medical History:  Diagnosis Date    A-fib ([facility])     Allergic     Allergic rhinitis     Arthritis     Chest pain 03/27/2023    10/4/23 no recent s/s sees [facility] yearly    Dental crowns present     Exercise involving walking     Hammertoe     Hyperlipidemia     Hypertension     Nasal congestion     Nasal obstruction     Skin tag     Varicella     Wears contact lenses     will wear glasses DOS   [2]   Past Surgical History:  Procedure Laterality Date    CARDIAC ELECTROPHYSIOLOGY PROCEDURE N/A 03/23/2022    Procedure: Cardiac eps/afib ablation;  Surgeon: Luis Morrow DO;  [facility]: [facility];  Service: Cardiology    CARDIAC ELECTROPHYSIOLOGY PROCEDURE N/A 03/23/2022    Procedure: Cardiac eps/aflutter ablation;  Surgeon: Luis Morrow DO;  Location: [facility];  Service: Cardiology    CHEILECTOMY Right 10/13/2023    Procedure: CHEILECTOMY;  Surgeon: Yonatan Raphael DPM;  [facility];  Service: Podiatry    CHOLECYSTECTOMY  1991    COLONOSCOPY      x2 - last was 2008 (neg)    NASAL/SINUS ENDOSCOPY Bilateral 11/03/2022    Procedure: FESS (MAXILLARY WITH  TOTAL ETHMOIDECTOMY, AND FRONTAL SINUSOTOMY), Sphenoid WITH tissue removal and  IMAGE GUIDANCE.;  Surgeon: Lee Viera MD;  [facility];  Service: ENT    NOSE SURGERY      See ENT note 2016    WA CORRJ HLX VLGS BNCTY SESMDC PROX PHLX OSTEOT Right 10/13/2023    Procedure: BUNION MAIRA;  Surgeon: Yonatan Raphael DPM;  [facility];  Service: Podiatry    WA SEPTOPLASTY/SUBMUCOUS RESECJ W/WO CARTILAGE GRF Bilateral 11/03/2022    Procedure: SEPTOPLASTY;  Surgeon: Lee Viera MD;  [facility];  Service: ENT    SINUS SURGERY      TOE OSTEOTOMY Right 10/13/2023    Procedure: 2nd HAMMERTOE REPAIR;  Surgeon: Yonatan Raphael DPM;  [facility];  Service: Podiatry   [3]   Family  History  Problem Relation Name Age of Onset    Other Mother Mirna Ron         Sarcoma    Cancer Mother Mirna Ron     Hypertension Father Nas Ron     Arthritis Maternal Grandmother Zari Shields    [4]   Social History  Tobacco Use    Smoking status: Former     Current packs/day: 0.00     Average packs/day: 1 pack/day for 35.9 years (35.9 ttl pk-yrs)     Types: Cigarettes     Start date: 1/1/1979     Quit date: 12/1/2014     Years since quitting: 10.5    Smokeless tobacco: Never   Vaping Use    Vaping status: Never Used   Substance and Sexual Activity    Alcohol use: Yes     Alcohol/week: 7.0 standard drinks of alcohol     Types: 7 Glasses of wine per week     Comment: 1 glass of wine daily    Drug use: No    Sexual activity: Yes     Partners: Male     Birth control/protection: Condom Male     Comment: defer   [5]   Current Outpatient Medications on File Prior to Visit   Medication Sig    apixaban (Eliquis) 5 mg Take 1 tablet (5 mg total) by mouth 2 (two) times a day    atorvastatin (LIPITOR) 10 mg tablet Take 1 tablet (10 mg total) by mouth daily    azelastine (ASTELIN) 0.1 % nasal spray 1 spray into each nostril if needed Use in each nostril as directed    diltiazem (CARDIZEM CD) 300 mg 24 hr capsule Take 1 capsule (300 mg total) by mouth every morning    hydroCHLOROthiazide 12.5 mg tablet Take 1 tablet (12.5 mg total) by mouth every morning    losartan (COZAAR) 100 MG tablet Take 1 tablet (100 mg total) by mouth every evening

## 2025-06-24 ENCOUNTER — TELEPHONE (OUTPATIENT)
Age: 65
End: 2025-06-24

## 2025-07-09 ENCOUNTER — OFFICE VISIT (OUTPATIENT)
Dept: CARDIOLOGY CLINIC | Facility: CLINIC | Age: 65
End: 2025-07-09
Payer: COMMERCIAL

## 2025-07-09 ENCOUNTER — HOSPITAL ENCOUNTER (OUTPATIENT)
Dept: CT IMAGING | Facility: HOSPITAL | Age: 65
Discharge: HOME | End: 2025-07-09
Attending: INTERNAL MEDICINE
Payer: COMMERCIAL

## 2025-07-09 VITALS
DIASTOLIC BLOOD PRESSURE: 74 MMHG | WEIGHT: 217.4 LBS | SYSTOLIC BLOOD PRESSURE: 150 MMHG | BODY MASS INDEX: 28.81 KG/M2 | HEART RATE: 87 BPM | HEIGHT: 73 IN

## 2025-07-09 DIAGNOSIS — Z98.890 S/P ABLATION OF ATRIAL FIBRILLATION: ICD-10-CM

## 2025-07-09 DIAGNOSIS — Z86.79 S/P ABLATION OF ATRIAL FIBRILLATION: ICD-10-CM

## 2025-07-09 DIAGNOSIS — R00.2 PALPITATIONS: ICD-10-CM

## 2025-07-09 DIAGNOSIS — Z79.01 ANTICOAGULATED: ICD-10-CM

## 2025-07-09 DIAGNOSIS — I48.3 TYPICAL ATRIAL FLUTTER (HCC): ICD-10-CM

## 2025-07-09 DIAGNOSIS — I48.0 PAROXYSMAL ATRIAL FIBRILLATION (HCC): Primary | ICD-10-CM

## 2025-07-09 DIAGNOSIS — Z12.2 SCREENING FOR LUNG CANCER: ICD-10-CM

## 2025-07-09 PROCEDURE — 99213 OFFICE O/P EST LOW 20 MIN: CPT | Performed by: INTERNAL MEDICINE

## 2025-07-09 PROCEDURE — 93000 ELECTROCARDIOGRAM COMPLETE: CPT | Performed by: INTERNAL MEDICINE

## 2025-07-09 PROCEDURE — 71250 CT THORAX DX C-: CPT

## 2025-07-09 NOTE — PROGRESS NOTES
"EPS Progress Note - Eric Ron 64 y.o. male MRN: [id number]           ASSESSMENT:  1. Paroxysmal atrial fibrillation (HCC)  POCT ECG      2. Typical atrial flutter (HCC)  POCT ECG      3. S/P ablation of atrial fibrillation        4. Palpitations        5. Anticoagulated with Eliquis                PLAN:  Continue current medications.  He is doing remarkably well.  Follow-up 1 year    HPI:   Interim history he is doing quite well no atrial fibrillation.  He is retired.  He offers no complaints he keeps very busy          Review of Systems   Constitutional: Negative for chills and fever.   HENT:  Negative for congestion and sore throat.    Eyes:  Negative for blurred vision and double vision.   Cardiovascular:  Negative for chest pain, claudication, dyspnea on exertion, leg swelling, near-syncope, orthopnea, palpitations, paroxysmal nocturnal dyspnea and syncope.   Respiratory:  Negative for cough, shortness of breath and sputum production.    Hematologic/Lymphatic: Negative for bleeding problem. Does not bruise/bleed easily.   Skin:  Negative for itching and rash.   Musculoskeletal:  Negative for arthritis and joint pain.   Gastrointestinal:  Negative for abdominal pain, nausea and vomiting.   Genitourinary:  Negative for hematuria.   Neurological:  Negative for dizziness, focal weakness, headaches, light-headedness and weakness.   Psychiatric/Behavioral:  Negative for depression. The patient is not nervous/anxious.           Objective:     Vitals: Blood pressure 150/74, pulse 87, height 6' 0.5\" (1.842 m), weight 98.6 kg (217 lb 6.4 oz)., Body mass index is 29.08 kg/m².,        Physical Exam:    GEN: Eric Ron appears well, alert and oriented x 3, pleasant and cooperative   HEENT: pupils equal, round, and reactive to light; extraocular muscles intact  NECK: supple, no carotid bruits   HEART: regular rhythm, normal S1 and S2, no murmurs, clicks, gallops or rubs   LUNGS: clear to auscultation bilaterally; " no wheezes, rales, or rhonchi   ABDOMEN: normal bowel sounds, soft, no tenderness, no distention  EXTREMITIES: peripheral pulses normal; no clubbing, cyanosis, or edema  NEURO: no focal findings   SKIN: normal without suspicious lesions on exposed skin    Medications:    Current Medications[1]     Family History[2]  Social History     Socioeconomic History    Marital status: Single     Spouse name: Not on file    Number of children: Not on file    Years of education: Not on file    Highest education level: Not on file   Occupational History    Occupation: [employer], wripl     Employer: [employer]   Tobacco Use    Smoking status: Former     Current packs/day: 0.00     Average packs/day: 1 pack/day for 35.9 years (35.9 ttl pk-yrs)     Types: Cigarettes     Start date: 1/1/1979     Quit date: 12/1/2014     Years since quitting: 10.6    Smokeless tobacco: Never   Vaping Use    Vaping status: Never Used   Substance and Sexual Activity    Alcohol use: Yes     Alcohol/week: 7.0 standard drinks of alcohol     Types: 7 Glasses of wine per week     Comment: 1 glass of wine daily    Drug use: No    Sexual activity: Yes     Partners: Male     Birth control/protection: Condom Male     Comment: defer   Other Topics Concern    Not on file   Social History Narrative    Recreational activities:  Walking     Social Drivers of Health     Financial Resource Strain: Not on file   Food Insecurity: No Food Insecurity (6/15/2025)    Nursing - Inadequate Food Risk Classification     Worried About Running Out of Food in the Last Year: Never true     Ran Out of Food in the Last Year: Never true     Ran Out of Food in the Last Year: Not on file   Transportation Needs: No Transportation Needs (6/15/2025)    PRAPARE - Transportation     Lack of Transportation (Medical): No     Lack of Transportation (Non-Medical): No   Physical Activity: Not on file   Stress: Not on file   Social Connections: Not on file   Intimate Partner  Violence: Not on file   Housing Stability: Low Risk  (6/15/2025)    Housing Stability Vital Sign     Unable to Pay for Housing in the Last Year: No     Number of Times Moved in the Last Year: 0     Homeless in the Last Year: No     Tobacco Use History[3]  Social History     Substance and Sexual Activity   Alcohol Use Yes    Alcohol/week: 7.0 standard drinks of alcohol    Types: 7 Glasses of wine per week    Comment: 1 glass of wine daily       Labs & Results:  Below is the patient's most recent value for Albumin, ALT, AST, BUN, Calcium, Chloride, Cholesterol, CO2, Creatinine, GFR, Glucose, HDL, Hematocrit, Hemoglobin, Hemoglobin A1C, LDL, Magnesium, Phosphorus, Platelets, Potassium, PSA, Sodium, Triglycerides, and WBC.   Lab Results   Component Value Date    ALT 27 10/03/2024    AST 18 10/03/2024    BUN 21 2025    CALCIUM 8.9 2025     2025    CHOL 154 2015    CO2 27 2025    CREATININE 1.08 2025    HDL 55 2024    HCT 42.7 10/03/2024    HGB 14.8 10/03/2024    HGBA1C 5.4 2024    MG 1.9 2019     10/03/2024    K 4.2 2025    PSA 1.022 2024     2015    TRIG 123 2024    WBC 6.07 10/03/2024     Note: for a comprehensive list of the patient's lab results, access the Results Review activity.            Cardiac testing:   Results for orders placed during the hospital encounter of 19    Echo complete with contrast if indicated    Narrative  [address] [address] [phone]    Transthoracic Echocardiogram  2D, M-mode, Doppler, and Color Doppler    Study date:  2019    Patient: VIVIANE CARPENTER  MR number: [id number]  Account number: [id number]  : 1960  Age: 58 years  Gender: Male  Status: Outpatient  Location: [address] [place] [address] [facility]  Height: 73 in  Weight: 202 lb  BP: 139/ 79 mmHg    Indications: Atrial flutter    Diagnoses: I48.1 - Atrial  nereida    Sonographer:  LAZARO Frank  Interpreting Physician:  Eric Donis DO  Primary Physician:  Yonatan Holcomb DO  Referring Physician:  Tulio Cardenas DO  Group:  [facility]  cc:  Luis Morrow DO  Cardiology Fellow:  Se Feng MD    SUMMARY    LEFT VENTRICLE:  Systolic function was normal. Ejection fraction was estimated to be 55 %.  There were no regional wall motion abnormalities.    RIGHT VENTRICLE:  The size was normal.  Systolic function was normal.    HISTORY: PRIOR HISTORY: Hypertension    PROCEDURE: The study was performed in the [address] [place] [address] [facility]. This was a routine study. The transthoracic approach was used. The study included complete 2D imaging, M-mode, complete spectral Doppler, and color Doppler. The  heart rate was 68 bpm, at the start of the study. Images were obtained from the parasternal, apical, subcostal, and suprasternal notch acoustic windows. Echocardiographic views were limited due to poor acoustic window availability. Image  quality was adequate.    LEFT VENTRICLE: Size was normal. Systolic function was normal. Ejection fraction was estimated to be 55 %. There were no regional wall motion abnormalities. Wall thickness was normal. DOPPLER: Left ventricular diastolic function parameters  were normal for the patient's age.    RIGHT VENTRICLE: The size was normal. Systolic function was normal. Wall thickness was normal.    LEFT ATRIUM: Size was normal.    RIGHT ATRIUM: Size was normal.    MITRAL VALVE: Valve structure was normal. There was normal leaflet separation. DOPPLER: The transmitral velocity was within the normal range. There was no evidence for stenosis. There was trace regurgitation.    AORTIC VALVE: The valve was trileaflet. Leaflets exhibited mildly increased thickness, mild calcification, and normal cuspal separation. DOPPLER: Transaortic velocity was within the normal range. There was no evidence for stenosis. There  was no  significant regurgitation.    TRICUSPID VALVE: The valve structure was normal. There was normal leaflet separation. DOPPLER: The transtricuspid velocity was within the normal range. There was no evidence for stenosis. There was no significant regurgitation.    PULMONIC VALVE: Leaflets exhibited normal thickness, no calcification, and normal cuspal separation. DOPPLER: The transpulmonic velocity was within the normal range. There was no significant regurgitation.    PERICARDIUM: There was no pericardial effusion.    AORTA: The root exhibited normal size.    SYSTEMIC VEINS: IVC: The inferior vena cava was normal in size.    SYSTEM MEASUREMENT TABLES    2D  %FS: 30.2 %  Ao Diam: 3.29 cm  EDV(Teich): 120.65 ml  EF(Cube): 65.99 %  EF(Teich): 57.24 %  ESV(Cube): 43.64 ml  ESV(Teich): 51.59 ml  IVSd: 0.88 cm  LA Area: 17.26 cm2  LA Diam: 3.48 cm  LVEDV MOD A4C: 109.23 ml  LVEF MOD A4C: 49.2 %  LVESV MOD A4C: 55.5 ml  LVIDd: 5.04 cm  LVIDs: 3.52 cm  LVLd A4C: 7.81 cm  LVLs A4C: 6.85 cm  LVPWd: 0.97 cm  RA Area: 13.92 cm2  RV Diam.: 3.75 cm  SV MOD A4C: 53.74 ml  SV(Cube): 84.66 ml  SV(Teich): 69.06 ml    CW  TR Vmax: 2.18 m/s  TR maxP.08 mmHg    MM  TAPSE: 1.98 cm    PW  E': 0.06 m/s  E/E': 8.6  MV A Moses: 0.51 m/s  MV Dec Ritchie: 1.8 m/s2  MV DecT: 281.82 ms  MV E Moses: 0.51 m/s  MV E/A Ratio: 1    [employer] Accredited Echocardiography Laboratory    Prepared and electronically signed by    Eric Donis DO  Signed 2019 10:03:56    No results found for this or any previous visit.    No results found for this or any previous visit.    No results found for this or any previous visit.                       [1]   Current Outpatient Medications:     apixaban (Eliquis) 5 mg, Take 1 tablet (5 mg total) by mouth 2 (two) times a day, Disp: 180 tablet, Rfl: 3    atorvastatin (LIPITOR) 10 mg tablet, Take 1 tablet (10 mg total) by mouth daily, Disp: 90 tablet, Rfl: 3    azelastine (ASTELIN) 0.1 % nasal spray, 1  spray into each nostril if needed Use in each nostril as directed, Disp: , Rfl:     diltiazem (CARDIZEM CD) 300 mg 24 hr capsule, Take 1 capsule (300 mg total) by mouth every morning, Disp: 90 capsule, Rfl: 1    hydroCHLOROthiazide 12.5 mg tablet, Take 1 tablet (12.5 mg total) by mouth every morning, Disp: 30 tablet, Rfl: 0    losartan (COZAAR) 100 MG tablet, Take 1 tablet (100 mg total) by mouth every evening, Disp: 90 tablet, Rfl: 3  [2]   Family History  Problem Relation Name Age of Onset    Other Mother Mirna Ron         Sarcoma    Cancer Mother Mirna Ron     Hypertension Father Nas Ron     Arthritis Maternal Grandmother Zari Shields    [3]   Social History  Tobacco Use   Smoking Status Former    Current packs/day: 0.00    Average packs/day: 1 pack/day for 35.9 years (35.9 ttl pk-yrs)    Types: Cigarettes    Start date: 1/1/1979    Quit date: 12/1/2014    Years since quitting: 10.6   Smokeless Tobacco Never

## 2025-07-16 DIAGNOSIS — I48.92 ATRIAL FLUTTER, UNSPECIFIED TYPE (HCC): ICD-10-CM

## 2025-07-16 DIAGNOSIS — I48.0 PAROXYSMAL ATRIAL FIBRILLATION (HCC): ICD-10-CM

## 2025-07-16 DIAGNOSIS — I10 ESSENTIAL HYPERTENSION: ICD-10-CM

## 2025-07-16 RX ORDER — APIXABAN 5 MG/1
5 TABLET, FILM COATED ORAL 2 TIMES DAILY
Qty: 180 TABLET | Refills: 1 | Status: SHIPPED | OUTPATIENT
Start: 2025-07-16

## 2025-07-16 RX ORDER — HYDROCHLOROTHIAZIDE 12.5 MG/1
12.5 TABLET ORAL EVERY MORNING
Qty: 30 TABLET | Refills: 0 | OUTPATIENT
Start: 2025-07-16

## 2025-07-16 RX ORDER — HYDROCHLOROTHIAZIDE 12.5 MG/1
12.5 TABLET ORAL EVERY MORNING
Qty: 90 TABLET | Refills: 1 | Status: SHIPPED | OUTPATIENT
Start: 2025-07-16

## 2025-07-16 RX ORDER — DILTIAZEM HYDROCHLORIDE 300 MG/1
300 CAPSULE, COATED, EXTENDED RELEASE ORAL EVERY MORNING
Qty: 90 CAPSULE | Refills: 1 | Status: SHIPPED | OUTPATIENT
Start: 2025-07-16

## 2025-07-16 NOTE — TELEPHONE ENCOUNTER
Patient called in because his my chart wouldn't let him add Diltiazem to his refill request. Please review and refill.     Reason for call:   [x] Refill   [] Prior Auth  [] Other:     Office:   [x] PCP/Provider -   [] Specialty/Provider -     Medication:     Hydrochlorothiazide 12.5 mg tablet taken by mouth once every morning #90 tabs     Diltiazem 300 mg 24 hr capsule taken by mouth once every morning #90 caps      [facility] [address] [phone]     Local Pharmacy   Does the patient have enough for 3 days?   [] Yes   [x] No - Send as HP to POD    Mail Away Pharmacy   Does the patient have enough for 10 days?   [] Yes   [] No - Send as HP to POD

## 2025-08-12 ENCOUNTER — TELEPHONE (OUTPATIENT)
Dept: GASTROENTEROLOGY | Facility: MEDICAL CENTER | Age: 65
End: 2025-08-12

## 2025-08-12 ENCOUNTER — OFFICE VISIT (OUTPATIENT)
Dept: GASTROENTEROLOGY | Facility: MEDICAL CENTER | Age: 65
End: 2025-08-12
Payer: COMMERCIAL

## 2025-08-20 ENCOUNTER — ANESTHESIA (OUTPATIENT)
Dept: ANESTHESIOLOGY | Facility: HOSPITAL | Age: 65
End: 2025-08-20

## 2025-08-20 ENCOUNTER — ANESTHESIA EVENT (OUTPATIENT)
Dept: ANESTHESIOLOGY | Facility: HOSPITAL | Age: 65
End: 2025-08-20

## (undated) DEVICE — Device

## (undated) DEVICE — SHEATH 1912020 5PK 4MM/70DEG STORZ LINV: Brand: ENDO-SCRUB®

## (undated) DEVICE — CHLORAPREP HI-LITE 26ML ORANGE

## (undated) DEVICE — SYRINGE 5ML LL

## (undated) DEVICE — CATH ABLATION FLEXABILITY SE 8FR BI-DIR F-J

## (undated) DEVICE — STRETCH BANDAGE: Brand: CURITY

## (undated) DEVICE — POV-IOD SOLUTION 4OZ BT

## (undated) DEVICE — CURITY NON-ADHERENT STRIPS: Brand: CURITY

## (undated) DEVICE — CATH ABLATION CRYOCATH ARCTIC FRONT ADV PRO 28MM

## (undated) DEVICE — SCD SEQUENTIAL COMPRESSION COMFORT SLEEVE MEDIUM KNEE LENGTH: Brand: KENDALL SCD

## (undated) DEVICE — GLOVE SRG BIOGEL 7.5

## (undated) DEVICE — INSTRUMENT TRACKER 9733533XOM ENT 1PK

## (undated) DEVICE — 22.5 MM X 6.9 MM X 0.53 MM SAGITTAL BLADE

## (undated) DEVICE — 2000CC GUARDIAN II: Brand: GUARDIAN

## (undated) DEVICE — PINNACLE INTRODUCER SHEATH: Brand: PINNACLE

## (undated) DEVICE — SUT ETHILON 4-0 PS-2 18 IN 1667H

## (undated) DEVICE — CATH EP  INQUIRY 6F 2-5-2MM  MED CRV STERABLE  DECAPOLAR 110CM

## (undated) DEVICE — GUIDE SHEATH SLO 8.5 FR

## (undated) DEVICE — SPLINT 1524050 5PK PAIR DOYLE II AIRWAY: Brand: DOYLE II ™

## (undated) DEVICE — SUT PROLENE 2-0 SH 36 IN 8523H

## (undated) DEVICE — CABLE CATHETER ACHIEVE MAPPING

## (undated) DEVICE — SUT VICRYL 4-0 PS-2 27 IN J426H

## (undated) DEVICE — CAST PADDING 4 IN SYNTHETIC NON-STRL

## (undated) DEVICE — REF PATCH ENSITE PRECISION

## (undated) DEVICE — PATIENT TRACKER 9734887XOM NON-INVASIVE

## (undated) DEVICE — BLADE 1884080EM TRICUT 4MMX13CM M4 ROHS: Brand: FUSION®

## (undated) DEVICE — CATH EP 7FR DI-DIR 10-POLE DEFL CS 2-8-2 FJ

## (undated) DEVICE — STOCKINETTE REGULAR

## (undated) DEVICE — 10FR FRAZIER SUCTION HANDLE: Brand: CARDINAL HEALTH

## (undated) DEVICE — NEEDLE 18 G X 1 1/2

## (undated) DEVICE — NEEDLE TRANSSEPTAL BRK-1 71CM

## (undated) DEVICE — AMPLATZ EXTRA STIFF WIRE GUIDE: Brand: AMPLATZ

## (undated) DEVICE — SUT VICRYL 3-0 PS-2 27 IN J427H

## (undated) DEVICE — NEURO PATTIES 1/2 X 3

## (undated) DEVICE — DGW .035 FC J3MM 150CM TEF: Brand: EMERALD

## (undated) DEVICE — U-DRAPE: Brand: CONVERTORS

## (undated) DEVICE — CATH ULTRASOUND ACUNAV ICE 8FR 90CM GE VIVID-I

## (undated) DEVICE — CABLE CRYOCATH ELECTRICAL UMBILICAL

## (undated) DEVICE — SHEATH 1912000 5PK 4MM/0DEG STORZ XOMED: Brand: ENDO-SCRUB®

## (undated) DEVICE — CATH EP ACHIEVE 20 MM MAPPING LOOP

## (undated) DEVICE — CATH COAXIAL UMBILICAL

## (undated) DEVICE — TUBING 1895522 5PK STRAIGHTSHOT TO XPS: Brand: STRAIGHTSHOT®

## (undated) DEVICE — SUT CHROMIC 4-0 PS-4 18 IN 1643G

## (undated) DEVICE — CATH ABLATION FLEXCATH ADVANCE OD12 FR STEERABLE

## (undated) DEVICE — NEEDLE 25G X 1 1/2

## (undated) DEVICE — STERILE NASAL PACK: Brand: CARDINAL HEALTH

## (undated) DEVICE — INTRO SHEATH 8FR 24CM ARROW-FLEX

## (undated) DEVICE — INTENDED FOR TISSUE SEPARATION, AND OTHER PROCEDURES THAT REQUIRE A SHARP SURGICAL BLADE TO PUNCTURE OR CUT.: Brand: BARD-PARKER ® CARBON RIB-BACK BLADES

## (undated) DEVICE — PLUMEPEN PRO 10FT

## (undated) DEVICE — TUBING SET COOL POINT

## (undated) DEVICE — SYRINGE 10ML LL

## (undated) DEVICE — BETHLEHEM UNIVERSAL  MIONR EXT: Brand: CARDINAL HEALTH

## (undated) DEVICE — GLOVE SRG BIOGEL ORTHOPEDIC 7.5

## (undated) DEVICE — GAUZE SPONGES,16 PLY: Brand: CURITY